# Patient Record
Sex: MALE | Race: BLACK OR AFRICAN AMERICAN | Employment: OTHER | URBAN - METROPOLITAN AREA
[De-identification: names, ages, dates, MRNs, and addresses within clinical notes are randomized per-mention and may not be internally consistent; named-entity substitution may affect disease eponyms.]

---

## 2018-10-19 ENCOUNTER — IP HISTORICAL/CONVERTED ENCOUNTER (OUTPATIENT)
Dept: OTHER | Age: 43
End: 2018-10-19

## 2020-10-05 ENCOUNTER — HOSPITAL ENCOUNTER (OUTPATIENT)
Dept: NON INVASIVE DIAGNOSTICS | Age: 45
Discharge: HOME | End: 2020-10-05
Attending: INTERNAL MEDICINE
Payer: MEDICARE

## 2020-10-05 VITALS
DIASTOLIC BLOOD PRESSURE: 83 MMHG | SYSTOLIC BLOOD PRESSURE: 149 MMHG | BODY MASS INDEX: 29.16 KG/M2 | WEIGHT: 220 LBS | HEIGHT: 73 IN

## 2020-10-05 DIAGNOSIS — R07.9 CHEST PAIN, UNSPECIFIED: ICD-10-CM

## 2020-10-05 LAB
ECHO AO ROOT DIAM: 3.6 CM
ECHO AV AREA PEAK VELOCITY: 3.6 CM2
ECHO AV AREA PLAN/BSA: 1.52
ECHO AV AREA VTI: 3.41 CM2
ECHO AV AREA/BSA PEAK VELOCITY: 1.6 CM2/M2
ECHO AV AREA/BSA VTI: 1.5 CM2/M2
ECHO AV CUSP MM: 2.8 CM
ECHO AV MEAN GRADIENT: 6 MMHG
ECHO AV PEAK GRADIENT: 12 MMHG
ECHO AV VTI: 30.9 CM
ECHO EST RA PRESSURE: 3 MMHG
ECHO LA AREA 2C: 28.2 CM2
ECHO LA AREA 4C: 29.5 CM2
ECHO LA MAJOR AXIS: 6.93 CM
ECHO LA MINOR AXIS: 3.09 CM
ECHO LA TO AORTIC ROOT RATIO: 1.33
ECHO LV E' LATERAL VELOCITY: 8.05 CM/S
ECHO LV EJECTION FRACTION BIPLANE: 64.7 % (ref 55–100)
ECHO LV INTERNAL DIMENSION DIASTOLIC: 5.8 CM (ref 4.2–5.9)
ECHO LV INTERNAL DIMENSION SYSTOLIC: 4.1 CM
ECHO LV IVSD: 1.3 CM (ref 0.6–1)
ECHO LV MASS 2D: 349.2 G (ref 88–224)
ECHO LV MASS INDEX 2D: 155.8 G/M2 (ref 49–115)
ECHO LV POSTERIOR WALL DIASTOLIC: 1.4 CM (ref 0.6–1)
ECHO LVOT DIAM: 2.4 CM
ECHO LVOT PEAK GRADIENT: 8 MMHG
ECHO LVOT VTI: 23.3 CM
ECHO LVOT VTI: 23.3 CM
ECHO MV A VELOCITY: 67.4 CM/S
ECHO MV AREA PHT: 2.82 CM2
ECHO MV E DECELERATION TIME (DT): 0.27 S
ECHO MV E VELOCITY: 95.6 CM/S
ECHO MV E/A RATIO: 1.42
ECHO MV E/E' LATERAL: 11.88
ECHO MV PRESSURE HALF TIME (PHT): 0.08 S
ECHO PV REGURGITANT MAX VELOCITY: 137 CM/S
ECHO PV REGURGITANT MAX VELOCITY: 172 CM/S
ECHO PV REGURGITANT MAX VELOCITY: 248 CM/S
ECHO RA AREA 4C: 19.7 CM2
ECHO RA MAJOR AXIS: 5.81 CM
ECHO RIGHT VENTRICULAR SYSTOLIC PRESSURE (RVSP): 28 MMHG
ECHO RV INTERNAL DIMENSION: 3.1 CM
ECHO RV TAPSE: 3.46 CM (ref 1.5–2)
ECHO TV MEAN GRADIENT: 4 MMHG
ECHO TV REGURGITANT PEAK GRADIENT: 25 MMHG
LA VOL DISK BP: 107.96 CM3 (ref 18–58)

## 2020-10-05 PROCEDURE — 93306 TTE W/DOPPLER COMPLETE: CPT

## 2020-10-15 ENCOUNTER — OFFICE VISIT (OUTPATIENT)
Dept: GASTROENTEROLOGY | Age: 45
End: 2020-10-15
Payer: MEDICARE

## 2020-10-15 DIAGNOSIS — N18.6 ANEMIA DUE TO CHRONIC KIDNEY DISEASE, ON CHRONIC DIALYSIS (HCC): ICD-10-CM

## 2020-10-15 DIAGNOSIS — D63.1 ANEMIA DUE TO CHRONIC KIDNEY DISEASE, ON CHRONIC DIALYSIS (HCC): ICD-10-CM

## 2020-10-15 DIAGNOSIS — I10 ESSENTIAL HYPERTENSION: ICD-10-CM

## 2020-10-15 DIAGNOSIS — K92.1 GASTROINTESTINAL HEMORRHAGE WITH MELENA: ICD-10-CM

## 2020-10-15 DIAGNOSIS — Z99.2 ANEMIA DUE TO CHRONIC KIDNEY DISEASE, ON CHRONIC DIALYSIS (HCC): ICD-10-CM

## 2020-10-15 DIAGNOSIS — K92.1 MELENA: Primary | ICD-10-CM

## 2020-10-15 PROCEDURE — 99203 OFFICE O/P NEW LOW 30 MIN: CPT | Performed by: INTERNAL MEDICINE

## 2020-10-15 RX ORDER — HYDRALAZINE HYDROCHLORIDE 100 MG/1
100 TABLET, FILM COATED ORAL 3 TIMES DAILY
COMMUNITY

## 2020-10-15 RX ORDER — CARVEDILOL 25 MG/1
25 TABLET ORAL 2 TIMES DAILY WITH MEALS
COMMUNITY

## 2020-10-15 RX ORDER — NIFEDIPINE 60 MG/1
60 TABLET, EXTENDED RELEASE ORAL DAILY
COMMUNITY
End: 2021-07-07 | Stop reason: SDUPTHER

## 2020-10-15 RX ORDER — SEVELAMER CARBONATE 800 MG/1
800 TABLET, FILM COATED ORAL 3 TIMES DAILY
COMMUNITY
End: 2021-08-10

## 2020-10-15 RX ORDER — FUROSEMIDE 40 MG/1
40 TABLET ORAL DAILY
COMMUNITY

## 2020-10-15 NOTE — H&P (VIEW-ONLY)
Gastroenterology Consult     Referring Physician: Andree Otto MD     Consult Date: 10/15/2020     Subjective:     Chief Complaint: GI bleeding    History of Present Illness: Samra Rogers is a 39 y.o. male who is seen in consultation for CRI and anemia   melena and on dialysis   Patient has history of chronic renal failure secondary to hypertension he is currently on dialysis. He has noted dark stools and low hemoglobin he was referred for further evaluation previously patient has had upper endoscopy and colonoscopy which was unremarkable he denies any abdominal pain nausea vomiting or weight loss. Past Medical History:   Diagnosis Date    Chronic kidney disease     Hypertension      Past Surgical History:   Procedure Laterality Date    HX ORTHOPAEDIC      left carpal tunnel     HX ROTATOR CUFF REPAIR        History reviewed. No pertinent family history. Social History     Tobacco Use    Smoking status: Current Every Day Smoker     Packs/day: 0.25     Years: 11.00     Pack years: 2.75    Smokeless tobacco: Never Used    Tobacco comment: 1 cigarette daily    Substance Use Topics    Alcohol use: Yes     Comment: social       No Known Allergies  Current Outpatient Medications   Medication Sig    sevelamer carbonate (RENVELA) 800 mg tab tab Take 800 mg by mouth three (3) times daily.  NIFEdipine ER (PROCARDIA XL) 60 mg ER tablet Take 60 mg by mouth daily.  carvediloL (COREG) 25 mg tablet Take 25 mg by mouth two (2) times daily (with meals).  hydrALAZINE (APRESOLINE) 100 mg tablet Take 100 mg by mouth three (3) times daily.  furosemide (LASIX) 40 mg tablet Take 40 mg by mouth daily. No current facility-administered medications for this visit. Review of Systems:  A detailed 10 organ review of systems is obtained with pertinent positives as listed in the History of Present Illness and Past Medical History. All others are negative.     Objective:     Physical Exam:  There were no vitals taken for this visit. Skin:  Extremities and face reveal no rashes. No ge erythema. No telangiectasias on the chest wall. HEENT: Sclerae anicteric. Extra-occular muscles are intact. No oral ulcers. No abnormal pigmentation of the lips. The neck is supple. Cardiovascular: Regular rate and rhythm. Respiratory:  Comfortable breathing with no accessory muscle use. GI:  Abdomen nondistended, soft, and nontender. Normal active bowel sounds. No enlargement of the liver or spleen. No masses palpable. Rectal:  Deferred  Musculoskeletal: Extremities have good range of motion. Neurological:  Gross memory appears intact. Patient is alert and oriented. Psychiatric:  Mood appears appropriate with judgement intact. Lymphatic:  No cervical or supraclavicular adenopathy. Lab/Data Review:  Lab results reviewed. For significant abnormal values and values requiring intervention, see assessment and plan. Assessment/Plan:     1. Melena  egd    2. Gastrointestinal hemorrhage with melena  Monitor hemoglobin and transfuse as necessary    3. Essential hypertension  Management by nephrology    4.  Anemia due to chronic kidney disease, on chronic dialysis [facility])         Thank you for allowing me to participate in this patients care

## 2020-10-15 NOTE — PROGRESS NOTES
Gastroenterology Consult     Referring Physician: Mary Fuller MD     Consult Date: 10/15/2020     Subjective:     Chief Complaint: GI bleeding    History of Present Illness: Marybeth Cedeño is a 39 y.o. male who is seen in consultation for CRI and anemia   melena and on dialysis   Patient has history of chronic renal failure secondary to hypertension he is currently on dialysis. He has noted dark stools and low hemoglobin he was referred for further evaluation previously patient has had upper endoscopy and colonoscopy which was unremarkable he denies any abdominal pain nausea vomiting or weight loss. Past Medical History:   Diagnosis Date    Chronic kidney disease     Hypertension      Past Surgical History:   Procedure Laterality Date    HX ORTHOPAEDIC      left carpal tunnel     HX ROTATOR CUFF REPAIR        History reviewed. No pertinent family history. Social History     Tobacco Use    Smoking status: Current Every Day Smoker     Packs/day: 0.25     Years: 11.00     Pack years: 2.75    Smokeless tobacco: Never Used    Tobacco comment: 1 cigarette daily    Substance Use Topics    Alcohol use: Yes     Comment: social       No Known Allergies  Current Outpatient Medications   Medication Sig    sevelamer carbonate (RENVELA) 800 mg tab tab Take 800 mg by mouth three (3) times daily.  NIFEdipine ER (PROCARDIA XL) 60 mg ER tablet Take 60 mg by mouth daily.  carvediloL (COREG) 25 mg tablet Take 25 mg by mouth two (2) times daily (with meals).  hydrALAZINE (APRESOLINE) 100 mg tablet Take 100 mg by mouth three (3) times daily.  furosemide (LASIX) 40 mg tablet Take 40 mg by mouth daily. No current facility-administered medications for this visit. Review of Systems:  A detailed 10 organ review of systems is obtained with pertinent positives as listed in the History of Present Illness and Past Medical History. All others are negative.     Objective:     Physical Exam:  There were no vitals taken for this visit. Skin:  Extremities and face reveal no rashes. No ge erythema. No telangiectasias on the chest wall. HEENT: Sclerae anicteric. Extra-occular muscles are intact. No oral ulcers. No abnormal pigmentation of the lips. The neck is supple. Cardiovascular: Regular rate and rhythm. Respiratory:  Comfortable breathing with no accessory muscle use. GI:  Abdomen nondistended, soft, and nontender. Normal active bowel sounds. No enlargement of the liver or spleen. No masses palpable. Rectal:  Deferred  Musculoskeletal: Extremities have good range of motion. Neurological:  Gross memory appears intact. Patient is alert and oriented. Psychiatric:  Mood appears appropriate with judgement intact. Lymphatic:  No cervical or supraclavicular adenopathy. Lab/Data Review:  Lab results reviewed. For significant abnormal values and values requiring intervention, see assessment and plan. Assessment/Plan:     1. Melena  egd    2. Gastrointestinal hemorrhage with melena  Monitor hemoglobin and transfuse as necessary    3. Essential hypertension  Management by nephrology    4.  Anemia due to chronic kidney disease, on chronic dialysis [facility])         Thank you for allowing me to participate in this patients care

## 2020-10-15 NOTE — PROGRESS NOTES
Glorious Para presents today for   Chief Complaint   Patient presents with    Abdominal Pain       Is someone accompanying this pt? no    Is the patient using any DME equipment during OV? No     Depression Screening:  3 most recent PHQ Screens 10/15/2020   Little interest or pleasure in doing things Not at all   Feeling down, depressed, irritable, or hopeless Not at all   Total Score PHQ 2 0       Learning Assessment:  No flowsheet data found. Health Maintenance reviewed and discussed and ordered per Provider. Health Maintenance Due   Topic Date Due    DTaP/Tdap/Td series (1 - Tdap) 01/10/1996    Lipid Screen  01/10/2015    Medicare Yearly Exam  08/07/2020    Flu Vaccine (1) 09/01/2020   . Coordination of Care:  1. Have you been to the ER, urgent care clinic since your last visit? Hospitalized since your last visit? Yes, difficulty raising arm and ended up being admitted for fluid around heart     2. Have you seen or consulted any other health care providers outside of the [address] [place] [address] since your last visit? Include any pap smears or colon screening.  no

## 2020-10-22 DIAGNOSIS — K92.1 GASTROINTESTINAL HEMORRHAGE WITH MELENA: ICD-10-CM

## 2020-10-26 ENCOUNTER — ANESTHESIA EVENT (OUTPATIENT)
Dept: ENDOSCOPY | Age: 45
End: 2020-10-26
Payer: MEDICARE

## 2020-10-26 RX ORDER — SODIUM CHLORIDE 0.9 % (FLUSH) 0.9 %
5-40 SYRINGE (ML) INJECTION EVERY 8 HOURS
Status: CANCELLED | OUTPATIENT
Start: 2020-10-27

## 2020-10-26 RX ORDER — MAGNESIUM SULFATE 100 %
4 CRYSTALS MISCELLANEOUS AS NEEDED
Status: CANCELLED | OUTPATIENT
Start: 2020-10-27

## 2020-10-26 RX ORDER — SODIUM CHLORIDE 0.9 % (FLUSH) 0.9 %
5-40 SYRINGE (ML) INJECTION AS NEEDED
Status: CANCELLED | OUTPATIENT
Start: 2020-10-27

## 2020-10-26 RX ORDER — DEXTROSE 50 % IN WATER (D50W) INTRAVENOUS SYRINGE
25-50 AS NEEDED
Status: CANCELLED | OUTPATIENT
Start: 2020-10-27

## 2020-10-26 RX ORDER — SODIUM CHLORIDE, SODIUM LACTATE, POTASSIUM CHLORIDE, CALCIUM CHLORIDE 600; 310; 30; 20 MG/100ML; MG/100ML; MG/100ML; MG/100ML
25 INJECTION, SOLUTION INTRAVENOUS CONTINUOUS
Status: CANCELLED | OUTPATIENT
Start: 2020-10-27 | End: 2020-10-28

## 2020-10-27 ENCOUNTER — HOSPITAL ENCOUNTER (OUTPATIENT)
Age: 45
Setting detail: OUTPATIENT SURGERY
Discharge: HOME | End: 2020-10-27
Attending: INTERNAL MEDICINE | Admitting: INTERNAL MEDICINE
Payer: MEDICARE

## 2020-10-27 ENCOUNTER — ANESTHESIA (OUTPATIENT)
Dept: ENDOSCOPY | Age: 45
End: 2020-10-27
Payer: MEDICARE

## 2020-10-27 VITALS
OXYGEN SATURATION: 97 % | RESPIRATION RATE: 20 BRPM | HEART RATE: 64 BPM | BODY MASS INDEX: 26.11 KG/M2 | SYSTOLIC BLOOD PRESSURE: 166 MMHG | HEIGHT: 73 IN | WEIGHT: 197 LBS | TEMPERATURE: 97.3 F | DIASTOLIC BLOOD PRESSURE: 94 MMHG

## 2020-10-27 LAB
COVID-19 RAPID TEST, COVR: NOT DETECTED
SARS-COV-2, COV2: NORMAL
SARS-COV-2, COV2: NORMAL
SPECIMEN SOURCE: NORMAL

## 2020-10-27 PROCEDURE — 76060000031 HC ANESTHESIA FIRST 0.5 HR: Performed by: INTERNAL MEDICINE

## 2020-10-27 PROCEDURE — 88305 TISSUE EXAM BY PATHOLOGIST: CPT

## 2020-10-27 PROCEDURE — 74011250636 HC RX REV CODE- 250/636: Performed by: NURSE ANESTHETIST, CERTIFIED REGISTERED

## 2020-10-27 PROCEDURE — 87635 SARS-COV-2 COVID-19 AMP PRB: CPT

## 2020-10-27 PROCEDURE — 76040000019: Performed by: INTERNAL MEDICINE

## 2020-10-27 PROCEDURE — 2709999900 HC NON-CHARGEABLE SUPPLY: Performed by: INTERNAL MEDICINE

## 2020-10-27 PROCEDURE — 77030037186 HC VLV ENDOSC STRL DEFENDO DISP MVAT -A: Performed by: INTERNAL MEDICINE

## 2020-10-27 RX ORDER — SODIUM CHLORIDE 0.9 % (FLUSH) 0.9 %
5-40 SYRINGE (ML) INJECTION AS NEEDED
Status: CANCELLED | OUTPATIENT
Start: 2020-10-27

## 2020-10-27 RX ORDER — SODIUM CHLORIDE 9 MG/ML
75 INJECTION, SOLUTION INTRAVENOUS CONTINUOUS
Status: CANCELLED | OUTPATIENT
Start: 2020-10-27

## 2020-10-27 RX ORDER — SODIUM CHLORIDE, SODIUM LACTATE, POTASSIUM CHLORIDE, CALCIUM CHLORIDE 600; 310; 30; 20 MG/100ML; MG/100ML; MG/100ML; MG/100ML
INJECTION, SOLUTION INTRAVENOUS
Status: DISCONTINUED | OUTPATIENT
Start: 2020-10-27 | End: 2020-10-27 | Stop reason: HOSPADM

## 2020-10-27 RX ORDER — SODIUM CHLORIDE 0.9 % (FLUSH) 0.9 %
5-40 SYRINGE (ML) INJECTION EVERY 8 HOURS
Status: CANCELLED | OUTPATIENT
Start: 2020-10-27

## 2020-10-27 RX ORDER — PROPOFOL 10 MG/ML
INJECTION, EMULSION INTRAVENOUS AS NEEDED
Status: DISCONTINUED | OUTPATIENT
Start: 2020-10-27 | End: 2020-10-27 | Stop reason: HOSPADM

## 2020-10-27 RX ADMIN — PROPOFOL 200 MG: 10 INJECTION, EMULSION INTRAVENOUS at 12:28

## 2020-10-27 RX ADMIN — SODIUM CHLORIDE, POTASSIUM CHLORIDE, SODIUM LACTATE AND CALCIUM CHLORIDE: 600; 310; 30; 20 INJECTION, SOLUTION INTRAVENOUS at 12:16

## 2020-10-27 NOTE — INTERVAL H&P NOTE
Update History & Physical    The Patient's History and Physical of October 2020,   Chart was reviewed with the patient and I examined the patient. There was no change. The surgical site was confirmed by the patient and me. Plan:  The risk, benefits, expected outcome, and alternative to the recommended procedure have been discussed with the patient. Patient understands and wants to proceed with the procedure.     Electronically signed by Luigi Marks MD on 10/27/2020 at 11:39 AM

## 2020-10-27 NOTE — ANESTHESIA POSTPROCEDURE EVALUATION
Procedure(s):  EGD with bx (DIALYSIS PATIENT) (RAPID TEST). No value filed. Anesthesia Post Evaluation      Multimodal analgesia: multimodal analgesia used between 6 hours prior to anesthesia start to PACU discharge  Patient location during evaluation: bedside  Patient participation: complete - patient cannot participate  Level of consciousness: awake and alert  Pain management: adequate  Airway patency: patent  Anesthetic complications: no  Cardiovascular status: stable  Respiratory status: acceptable  Hydration status: acceptable  Comments: DC when criteria met.   Post anesthesia nausea and vomiting:  none  Final Post Anesthesia Temperature Assessment:  Normothermia (36.0-37.5 degrees C)      INITIAL Post-op Vital signs:   Vitals Value Taken Time   /85 10/27/2020 12:35 PM   Temp 36.5 °C (97.7 °F) 10/27/2020 12:35 PM   Pulse 82 10/27/2020 12:35 PM   Resp 16 10/27/2020 12:35 PM   SpO2 99 % 10/27/2020 12:35 PM

## 2020-10-27 NOTE — OP NOTES
EGD Procedure Note        Patient: Bay Savage MRN: [id number]  SSN: [id number]    YOB: 1975  Age: 39 y.o. Sex: male        Date/Time:  10/27/2020 12:37 PM        IMPRESSION:   1. Gastric ulcer  2. Reflux esophagitis             Procedure: Esophagogastroduodenoscopy with biopsy    Indication:   1. GI bleeding  2. Melena    Endoscopist:  Chepe Castillo MD      Referring Provider:   Caryle Motes, MD    History: The history and physical exam were reviewed and updated. Endoscope: GIF [id number]     Extent of Exam: second portion of the duodenum    ASA: ASA 3 - Patient with moderate systemic disease with functional limitations    Anethesia/Sedation:  MAC anesthesia Propofol    Description of the procedure: The procedure was discussed with the patient including risks, benefits, alternatives including risks of iv sedation, bleeding, perforation and aspiration. A safety timeout was performed. The patient was placed in the left lateral decubitus position. A bite block was placed. The patient was using standard protocol. The patients vital signs were monitored at all times including heart rate/rhythm, blood pressure and oxygen saturation. The endoscope was then passed under direct visualization to the second portion of the duodenum. The endoscope was then slowly withdrawn while visualizing the mucosa. In the stomach a retroflexion was performed and gastric fundus and cardia visualized. The patient was then transferred to recovery in stable condition. Findings:   Esophagus: The esophageal mucosa was normal with no ulceration, mass or stricture. There was not evidence of Carrillo's esophagus was reflux esophagitis biopsies were taken for histology. Stomach: The gastric mucosa showed evidence of ulceration in the antrum this was biopsied for histology. There were no masses or stricture .    Duodenum: The duodenum mucosa was normal with no ulceration, mass, stricture and no evidence of villous atrophy. Therapies: None    Specimens:   ID Type Source Tests Collected by Time Destination   1 : GASTRIC ULCER BX Preservative Gastric  Rosy Laws MD 10/27/2020 [facility]   2 : [address]  Rosy Laws MD 10/27/2020 [facility]               EBL:None    Complications:   None; patient tolerated the procedure well. Discharge disposition:  Out of the recovery area when discharge criteria met        RECOMMENDATIONS:    1. Await pathology results proton pump inhibitor therapy monitor hemoglobin  2.  Repeat upper endoscopy in 3 months to ensure healing of the ulcer    Caleb Rhoades MD   October 27, 2020  12:37 PM

## 2020-10-27 NOTE — ANESTHESIA PREPROCEDURE EVALUATION
Relevant Problems   No relevant active problems       Anesthetic History   No history of anesthetic complications            Review of Systems / Medical History  Patient summary reviewed, nursing notes reviewed and pertinent labs reviewed    Pulmonary  Within defined limits                 Neuro/Psych   Within defined limits           Cardiovascular  Within defined limits                Exercise tolerance: >4 METS     GI/Hepatic/Renal  Within defined limits              Endo/Other  Within defined limits           Other Findings              Physical Exam    Airway  Mallampati: I  TM Distance: 4 - 6 cm  Neck ROM: normal range of motion   Mouth opening: Normal     Cardiovascular  Regular rate and rhythm,  S1 and S2 normal,  no murmur, click, rub, or gallop  Rhythm: regular  Rate: normal         Dental  No notable dental hx       Pulmonary  Breath sounds clear to auscultation               Abdominal  GI exam deferred       Other Findings            Anesthetic Plan    ASA: 3  Anesthesia type: total IV anesthesia          Induction: Intravenous  Anesthetic plan and risks discussed with: Patient

## 2020-10-29 LAB — SARS-COV-2, COV2NT: NOT DETECTED

## 2020-12-29 ENCOUNTER — HOSPITAL ENCOUNTER (INPATIENT)
Age: 45
LOS: 1 days | Discharge: HOME | DRG: 640 | End: 2020-12-30
Attending: EMERGENCY MEDICINE | Admitting: INTERNAL MEDICINE
Payer: MEDICARE

## 2020-12-29 ENCOUNTER — APPOINTMENT (OUTPATIENT)
Dept: GENERAL RADIOLOGY | Age: 45
DRG: 640 | End: 2020-12-29
Attending: EMERGENCY MEDICINE
Payer: MEDICARE

## 2020-12-29 DIAGNOSIS — Z99.2 END-STAGE RENAL DISEASE ON HEMODIALYSIS (HCC): ICD-10-CM

## 2020-12-29 DIAGNOSIS — D63.8 ANEMIA IN OTHER CHRONIC DISEASES CLASSIFIED ELSEWHERE: ICD-10-CM

## 2020-12-29 DIAGNOSIS — N18.6 END-STAGE RENAL DISEASE ON HEMODIALYSIS (HCC): ICD-10-CM

## 2020-12-29 DIAGNOSIS — E87.5 ACUTE HYPERKALEMIA: Primary | ICD-10-CM

## 2020-12-29 PROBLEM — D64.9 ANEMIA: Status: ACTIVE | Noted: 2020-12-29

## 2020-12-29 PROBLEM — I10 HTN (HYPERTENSION): Status: ACTIVE | Noted: 2020-12-29

## 2020-12-29 PROBLEM — R19.7 DIARRHEA: Status: ACTIVE | Noted: 2020-12-29

## 2020-12-29 LAB
ALBUMIN SERPL-MCNC: 4.1 G/DL (ref 3.5–4.7)
ALBUMIN/GLOB SERPL: 1.1 {RATIO}
ALP SERPL-CCNC: 58 U/L (ref 38–126)
ALT SERPL-CCNC: 11 U/L (ref 3–72)
ANION GAP SERPL CALC-SCNC: 13 MMOL/L
AST SERPL W P-5'-P-CCNC: 15 U/L (ref 17–74)
ATRIAL RATE: 82 BPM
BASOPHILS # BLD: 0.1 K/UL
BASOPHILS NFR BLD: 1 %
BILIRUB SERPL-MCNC: 0.5 MG/DL (ref 0.2–1)
BUN SERPL-MCNC: 77 MG/DL (ref 9–21)
BUN/CREAT SERPL: 4
CA-I BLD-MCNC: 8.8 MG/DL (ref 8.5–10.5)
CALCULATED P AXIS, ECG09: 42 DEGREES
CALCULATED R AXIS, ECG10: -22 DEGREES
CALCULATED T AXIS, ECG11: 91 DEGREES
CHLORIDE SERPL-SCNC: 108 MMOL/L (ref 94–111)
CO2 SERPL-SCNC: 23 MMOL/L (ref 21–33)
COLLECT DATE STL: NORMAL
CREAT SERPL-MCNC: 17.5 MG/DL (ref 0.8–1.5)
DIAGNOSIS, 93000: NORMAL
DIFFERENTIAL METHOD BLD: ABNORMAL
EOSINOPHIL # BLD: 0 K/UL
EOSINOPHIL NFR BLD: 0 %
ERYTHROCYTE [DISTWIDTH] IN BLOOD BY AUTOMATED COUNT: 18 % (ref 11.6–14.5)
GLOBULIN SER CALC-MCNC: 3.8 G/DL
GLUCOSE SERPL-MCNC: 90 MG/DL (ref 70–110)
HCT VFR BLD AUTO: 23.4 % (ref 36–48)
HEMOCCULT SP1 STL QL: POSITIVE
HGB BLD-MCNC: 6.9 G/DL (ref 13–16)
IMM GRANULOCYTES # BLD AUTO: 0 K/UL
IMM GRANULOCYTES NFR BLD AUTO: 0 %
LYMPHOCYTES # BLD: 1.5 K/UL
LYMPHOCYTES NFR BLD: 20 %
MCH RBC QN AUTO: 28.9 PG (ref 24–34)
MCHC RBC AUTO-ENTMCNC: 29.5 G/DL (ref 31–37)
MCV RBC AUTO: 97.9 FL (ref 74–97)
MONOCYTES # BLD: 0.4 K/UL
MONOCYTES NFR BLD: 5 %
NEUTS SEG # BLD: 5.7 K/UL
NEUTS SEG NFR BLD: 74 %
P-R INTERVAL, ECG05: 183 MS
PLATELET # BLD AUTO: 301 K/UL (ref 135–420)
PMV BLD AUTO: 10.3 FL (ref 9.2–11.8)
POTASSIUM SERPL-SCNC: 6.8 MMOL/L (ref 3.2–5.1)
POTASSIUM SERPL-SCNC: 7.7 MMOL/L (ref 3.2–5.1)
PROT SERPL-MCNC: 7.9 G/DL (ref 6.1–8.4)
Q-T INTERVAL, ECG07: 359 MS
QRS DURATION, ECG06: 96 MS
QTC CALCULATION (BEZET), ECG08: 420 MS
RBC # BLD AUTO: 2.39 M/UL (ref 4.7–5.5)
RBC MORPH BLD: ABNORMAL
RBC MORPH BLD: ABNORMAL
SODIUM SERPL-SCNC: 144 MMOL/L (ref 135–145)
VENTRICULAR RATE, ECG03: 82 BPM
WBC # BLD AUTO: 7.7 K/UL (ref 4.6–13.2)

## 2020-12-29 PROCEDURE — 84132 ASSAY OF SERUM POTASSIUM: CPT

## 2020-12-29 PROCEDURE — 74011000258 HC RX REV CODE- 258: Performed by: EMERGENCY MEDICINE

## 2020-12-29 PROCEDURE — 86901 BLOOD TYPING SEROLOGIC RH(D): CPT

## 2020-12-29 PROCEDURE — 74011000250 HC RX REV CODE- 250: Performed by: EMERGENCY MEDICINE

## 2020-12-29 PROCEDURE — 74011250637 HC RX REV CODE- 250/637: Performed by: EMERGENCY MEDICINE

## 2020-12-29 PROCEDURE — 90935 HEMODIALYSIS ONE EVALUATION: CPT

## 2020-12-29 PROCEDURE — 74011250637 HC RX REV CODE- 250/637: Performed by: NURSE PRACTITIONER

## 2020-12-29 PROCEDURE — 96375 TX/PRO/DX INJ NEW DRUG ADDON: CPT

## 2020-12-29 PROCEDURE — 74011250636 HC RX REV CODE- 250/636

## 2020-12-29 PROCEDURE — P9016 RBC LEUKOCYTES REDUCED: HCPCS

## 2020-12-29 PROCEDURE — 99285 EMERGENCY DEPT VISIT HI MDM: CPT

## 2020-12-29 PROCEDURE — 71045 X-RAY EXAM CHEST 1 VIEW: CPT

## 2020-12-29 PROCEDURE — 93005 ELECTROCARDIOGRAM TRACING: CPT

## 2020-12-29 PROCEDURE — 85025 COMPLETE CBC W/AUTO DIFF WBC: CPT

## 2020-12-29 PROCEDURE — 96365 THER/PROPH/DIAG IV INF INIT: CPT

## 2020-12-29 PROCEDURE — 65270000029 HC RM PRIVATE

## 2020-12-29 PROCEDURE — 82728 ASSAY OF FERRITIN: CPT

## 2020-12-29 PROCEDURE — 83540 ASSAY OF IRON: CPT

## 2020-12-29 PROCEDURE — 30233N1 TRANSFUSION OF NONAUTOLOGOUS RED BLOOD CELLS INTO PERIPHERAL VEIN, PERCUTANEOUS APPROACH: ICD-10-PCS | Performed by: INTERNAL MEDICINE

## 2020-12-29 PROCEDURE — 74011250636 HC RX REV CODE- 250/636: Performed by: EMERGENCY MEDICINE

## 2020-12-29 PROCEDURE — 74011636637 HC RX REV CODE- 636/637: Performed by: EMERGENCY MEDICINE

## 2020-12-29 PROCEDURE — 82272 OCCULT BLD FECES 1-3 TESTS: CPT

## 2020-12-29 PROCEDURE — 94760 N-INVAS EAR/PLS OXIMETRY 1: CPT

## 2020-12-29 PROCEDURE — 5A1D70Z PERFORMANCE OF URINARY FILTRATION, INTERMITTENT, LESS THAN 6 HOURS PER DAY: ICD-10-PCS | Performed by: INTERNAL MEDICINE

## 2020-12-29 RX ORDER — SODIUM POLYSTYRENE SULFONATE 15 G/60ML
30 SUSPENSION ORAL; RECTAL
Status: COMPLETED | OUTPATIENT
Start: 2020-12-29 | End: 2020-12-29

## 2020-12-29 RX ORDER — DEXTROSE 50 % IN WATER (D50W) INTRAVENOUS SYRINGE
25
Status: COMPLETED | OUTPATIENT
Start: 2020-12-29 | End: 2020-12-29

## 2020-12-29 RX ORDER — SEVELAMER CARBONATE 800 MG/1
800 TABLET, FILM COATED ORAL 3 TIMES DAILY
Status: DISCONTINUED | OUTPATIENT
Start: 2020-12-29 | End: 2020-12-30 | Stop reason: HOSPADM

## 2020-12-29 RX ORDER — SODIUM CHLORIDE 0.9 % (FLUSH) 0.9 %
5-40 SYRINGE (ML) INJECTION EVERY 8 HOURS
Status: DISCONTINUED | OUTPATIENT
Start: 2020-12-29 | End: 2020-12-30 | Stop reason: HOSPADM

## 2020-12-29 RX ORDER — OMEPRAZOLE 40 MG/1
40 CAPSULE, DELAYED RELEASE ORAL 2 TIMES DAILY
COMMUNITY
End: 2021-09-01

## 2020-12-29 RX ORDER — DIPHENHYDRAMINE HCL 25 MG
25 TABLET ORAL ONCE
Status: ACTIVE | OUTPATIENT
Start: 2020-12-29 | End: 2020-12-29

## 2020-12-29 RX ORDER — ACETAMINOPHEN 650 MG/1
650 SUPPOSITORY RECTAL
Status: DISCONTINUED | OUTPATIENT
Start: 2020-12-29 | End: 2020-12-30 | Stop reason: HOSPADM

## 2020-12-29 RX ORDER — CARVEDILOL 12.5 MG/1
25 TABLET ORAL 2 TIMES DAILY WITH MEALS
Status: DISCONTINUED | OUTPATIENT
Start: 2020-12-29 | End: 2020-12-30 | Stop reason: HOSPADM

## 2020-12-29 RX ORDER — IBUPROFEN 200 MG
1 TABLET ORAL DAILY PRN
Status: DISCONTINUED | OUTPATIENT
Start: 2020-12-29 | End: 2020-12-30 | Stop reason: HOSPADM

## 2020-12-29 RX ORDER — HYDRALAZINE HYDROCHLORIDE 25 MG/1
100 TABLET, FILM COATED ORAL 3 TIMES DAILY
Status: DISCONTINUED | OUTPATIENT
Start: 2020-12-29 | End: 2020-12-30 | Stop reason: HOSPADM

## 2020-12-29 RX ORDER — ACETAMINOPHEN 325 MG/1
650 TABLET ORAL
Status: DISCONTINUED | OUTPATIENT
Start: 2020-12-29 | End: 2020-12-30 | Stop reason: HOSPADM

## 2020-12-29 RX ORDER — POLYETHYLENE GLYCOL 3350 17 G/17G
17 POWDER, FOR SOLUTION ORAL DAILY PRN
Status: DISCONTINUED | OUTPATIENT
Start: 2020-12-29 | End: 2020-12-30 | Stop reason: HOSPADM

## 2020-12-29 RX ORDER — PROMETHAZINE HYDROCHLORIDE 25 MG/1
12.5 TABLET ORAL
Status: DISCONTINUED | OUTPATIENT
Start: 2020-12-29 | End: 2020-12-30 | Stop reason: HOSPADM

## 2020-12-29 RX ORDER — SODIUM CHLORIDE 9 MG/ML
250 INJECTION, SOLUTION INTRAVENOUS AS NEEDED
Status: DISCONTINUED | OUTPATIENT
Start: 2020-12-29 | End: 2020-12-30 | Stop reason: HOSPADM

## 2020-12-29 RX ORDER — FUROSEMIDE 40 MG/1
40 TABLET ORAL DAILY
Status: DISCONTINUED | OUTPATIENT
Start: 2020-12-29 | End: 2020-12-30 | Stop reason: HOSPADM

## 2020-12-29 RX ORDER — ONDANSETRON 2 MG/ML
4 INJECTION INTRAMUSCULAR; INTRAVENOUS
Status: DISCONTINUED | OUTPATIENT
Start: 2020-12-29 | End: 2020-12-30 | Stop reason: HOSPADM

## 2020-12-29 RX ORDER — NIFEDIPINE 30 MG/1
60 TABLET, EXTENDED RELEASE ORAL DAILY
Status: DISCONTINUED | OUTPATIENT
Start: 2020-12-29 | End: 2020-12-30 | Stop reason: HOSPADM

## 2020-12-29 RX ORDER — PANTOPRAZOLE SODIUM 40 MG/1
40 TABLET, DELAYED RELEASE ORAL 2 TIMES DAILY
Status: DISCONTINUED | OUTPATIENT
Start: 2020-12-29 | End: 2020-12-30 | Stop reason: HOSPADM

## 2020-12-29 RX ORDER — SODIUM CHLORIDE 0.9 % (FLUSH) 0.9 %
5-40 SYRINGE (ML) INJECTION AS NEEDED
Status: DISCONTINUED | OUTPATIENT
Start: 2020-12-29 | End: 2020-12-30 | Stop reason: HOSPADM

## 2020-12-29 RX ADMIN — CARVEDILOL 25 MG: 12.5 TABLET, FILM COATED ORAL at 16:53

## 2020-12-29 RX ADMIN — Medication 10 ML: at 08:56

## 2020-12-29 RX ADMIN — NIFEDIPINE 60 MG: 30 TABLET, FILM COATED, EXTENDED RELEASE ORAL at 08:55

## 2020-12-29 RX ADMIN — HYDRALAZINE HYDROCHLORIDE 100 MG: 25 TABLET, FILM COATED ORAL at 08:56

## 2020-12-29 RX ADMIN — CARVEDILOL 25 MG: 12.5 TABLET, FILM COATED ORAL at 08:56

## 2020-12-29 RX ADMIN — PANTOPRAZOLE SODIUM 40 MG: 40 TABLET, DELAYED RELEASE ORAL at 17:04

## 2020-12-29 RX ADMIN — EPOETIN ALFA-EPBX 10000 UNITS: 10000 INJECTION, SOLUTION INTRAVENOUS; SUBCUTANEOUS at 12:18

## 2020-12-29 RX ADMIN — SODIUM POLYSTYRENE SULFONATE 30 G: 15 SUSPENSION ORAL; RECTAL at 02:37

## 2020-12-29 RX ADMIN — SEVELAMER CARBONATE 800 MG: 800 TABLET, FILM COATED ORAL at 16:53

## 2020-12-29 RX ADMIN — INSULIN HUMAN 10 UNITS: 100 INJECTION, SOLUTION PARENTERAL at 02:33

## 2020-12-29 RX ADMIN — SEVELAMER CARBONATE 800 MG: 800 TABLET, FILM COATED ORAL at 08:55

## 2020-12-29 RX ADMIN — Medication 10 ML: at 21:13

## 2020-12-29 RX ADMIN — CALCIUM GLUCONATE 1 G: 98 INJECTION, SOLUTION INTRAVENOUS at 02:37

## 2020-12-29 RX ADMIN — SEVELAMER CARBONATE 800 MG: 800 TABLET, FILM COATED ORAL at 21:13

## 2020-12-29 RX ADMIN — HYDRALAZINE HYDROCHLORIDE 100 MG: 25 TABLET, FILM COATED ORAL at 16:53

## 2020-12-29 RX ADMIN — HYDRALAZINE HYDROCHLORIDE 100 MG: 25 TABLET, FILM COATED ORAL at 21:13

## 2020-12-29 RX ADMIN — DEXTROSE MONOHYDRATE 12.5 G: 25 INJECTION, SOLUTION INTRAVENOUS at 02:34

## 2020-12-29 RX ADMIN — FUROSEMIDE 40 MG: 40 TABLET ORAL at 08:56

## 2020-12-29 RX ADMIN — SODIUM POLYSTYRENE SULFONATE 30 G: 15 SUSPENSION ORAL; RECTAL at 05:10

## 2020-12-29 RX ADMIN — PANTOPRAZOLE SODIUM 40 MG: 40 TABLET, DELAYED RELEASE ORAL at 08:56

## 2020-12-29 NOTE — PROGRESS NOTES
conducted an initial consultation and Spiritual Assessment for Arturo Day, who is a 39 y.o.,male. Patients Primary Language is: [place]. According to the patients EMR Catholic Affiliation is: [facility].     The reason the Patient came to the hospital is:   Patient Active Problem List    Diagnosis Date Noted    Diarrhea 12/29/2020     Priority: 4 - Four    ESRD (end stage renal disease) on dialysis ([facility] [place] [address] 12/29/2020    Anemia 12/29/2020    HTN (hypertension) 12/29/2020    Acute hyperkalemia 12/29/2020        The  provided the following Interventions:  Initiated a relationship of care and support. Explored issues of eliud, belief, spirituality and Baptism/ritual needs while hospitalized. Listened empathically. Provided chaplaincy education. Provided information about Spiritual Care Services. Offered assurance of continued prayers on patient's behalf. Chart reviewed. The following outcomes where achieved:  Patient expressed gratitude for 's visit. Assessment:  Patient does not have any Baptism/cultural needs that will affect patients preferences in health care. There are no spiritual or Baptism issues which require intervention at this time. Plan:  Chaplains will continue to follow and will provide pastoral care on an as needed/requested basis.  recommends bedside caregivers page  on duty if patient shows signs of acute spiritual or emotional distress.         [address] [facility].   [phone]

## 2020-12-29 NOTE — ED PROVIDER NOTES
EMERGENCY DEPARTMENT HISTORY AND PHYSICAL EXAM      Date: 12/29/2020  Patient Name: Yeimy Chowdhury    History of Presenting Illness     Chief Complaint   Patient presents with    Shortness of Breath     TTS, missed Saturday HD    Diarrhea     3-4 episodes,  12/26/2020    Skin Problem     raised areas to occiput of head       History Provided By: Patient    HPI: Yeimy Chowdhury, 39 y.o. male with a past medical history significant hypertension and ESRD on HD Sat/Tue/Thur; GI bleed, CAD, MI presents to the ED with cc of shortness of breath of insidious onset. Patient missed his last dialysis on Saturday. His next dialysis is this morning at 5 AM.  No chest pain or other constitutional symptoms. No other relieving or aggravating factors. Also had episodes of diarrhea for the last 3 days, total of 3-4 episodes. Tthere are no other complaints, changes, or physical findings at this time. PCP: Bakari Keith MD    No current facility-administered medications on file prior to encounter. Current Outpatient Medications on File Prior to Encounter   Medication Sig Dispense Refill    omeprazole (PRILOSEC) 40 mg capsule Take 40 mg by mouth two (2) times a day.  sevelamer carbonate (RENVELA) 800 mg tab tab Take 800 mg by mouth three (3) times daily.  NIFEdipine ER (PROCARDIA XL) 60 mg ER tablet Take 60 mg by mouth daily.  carvediloL (COREG) 25 mg tablet Take 25 mg by mouth two (2) times daily (with meals).  hydrALAZINE (APRESOLINE) 100 mg tablet Take 100 mg by mouth three (3) times daily.  furosemide (LASIX) 40 mg tablet Take 40 mg by mouth daily.          Past History     Past Medical History:  Past Medical History:   Diagnosis Date    CAD (coronary artery disease)     MI (12 years ago)    Chronic kidney disease     ESRD x4 years HD Treatment    Hypertension        Past Surgical History:  Past Surgical History:   Procedure Laterality Date    HX ORTHOPAEDIC      left carpal tunnel     HX ROTATOR CUFF REPAIR         Family History:  History reviewed. No pertinent family history. Social History:  Social History     Tobacco Use    Smoking status: Current Some Day Smoker     Packs/day: 0.50     Years: 15.00     Pack years: 7.50    Smokeless tobacco: Never Used    Tobacco comment: l   Substance Use Topics    Alcohol use: Yes     Alcohol/week: 1.0 - 2.0 standard drinks     Types: 1 - 2 Glasses of wine per week     Comment: social     Drug use: Never       Allergies:  No Known Allergies      Review of Systems     Review of Systems   Constitutional: Positive for fatigue. Negative for fever. Respiratory: Positive for shortness of breath. Negative for cough. Cardiovascular: Negative for chest pain. Gastrointestinal: Positive for diarrhea. Negative for abdominal pain. Musculoskeletal: Negative for back pain and gait problem. Neurological: Negative for dizziness and seizures. Psychiatric/Behavioral: Negative for agitation and behavioral problems. Physical Exam     Physical Exam  Vitals signs and nursing note reviewed. Constitutional:       General: He is not in acute distress. Appearance: He is well-developed. He is not toxic-appearing or diaphoretic. HENT:      Head: Normocephalic and atraumatic. Nose: Nose normal.      Mouth/Throat:      Mouth: Mucous membranes are moist.      Pharynx: Oropharynx is clear. Eyes:      Extraocular Movements: Extraocular movements intact. Pupils: Pupils are equal, round, and reactive to light. Neck:      Musculoskeletal: Normal range of motion and neck supple. Cardiovascular:      Rate and Rhythm: Normal rate and regular rhythm. Heart sounds: Normal heart sounds. Pulmonary:      Effort: Pulmonary effort is normal. No respiratory distress. Breath sounds: Normal breath sounds. Chest:      Chest wall: No mass or tenderness. Abdominal:      General: Bowel sounds are normal. There is no abdominal bruit.       Palpations: Abdomen is soft. There is no hepatomegaly. Tenderness: There is no abdominal tenderness. There is no rebound. Musculoskeletal: Normal range of motion. Right lower leg: He exhibits no tenderness. No edema. Left lower leg: He exhibits no tenderness. No edema. Skin:     General: Skin is warm and dry. Capillary Refill: Capillary refill takes less than 2 seconds. Neurological:      General: No focal deficit present. Mental Status: He is alert and oriented to person, place, and time. Psychiatric:         Mood and Affect: Mood normal.         Behavior: Behavior normal.         Lab and Diagnostic Study Results     Labs -     Recent Results (from the past 24 hour(s))   CBC WITH AUTOMATED DIFF    Collection Time: 12/29/20  1:25 AM   Result Value Ref Range    WBC 7.7 4.6 - 13.2 K/uL    RBC 2.39 (L) 4.70 - 5.50 M/uL    HGB 6.9 (L) 13.0 - 16.0 g/dL    HCT 23.4 (L) 36.0 - 48.0 %    MCV 97.9 (H) 74.0 - 97.0 FL    MCH 28.9 24.0 - 34.0 PG    MCHC 29.5 (L) 31.0 - 37.0 g/dL    RDW 18.0 (H) 11.6 - 14.5 %    PLATELET 630 119 - 979 K/uL    MPV 10.3 9.2 - 11.8 FL    NEUTROPHILS 74 %    LYMPHOCYTES 20 %    MONOCYTES 5 %    EOSINOPHILS 0 %    BASOPHILS 1 %    IMMATURE GRANULOCYTES 0 %    ABS. NEUTROPHILS 5.7 K/UL    ABS. LYMPHOCYTES 1.5 K/UL    ABS. MONOCYTES 0.4 K/UL    ABS. EOSINOPHILS 0.0 K/UL    ABS. BASOPHILS 0.1 K/UL    ABS. IMM.  GRANS. 0.0 K/UL    DF Manual      RBC COMMENTS Anisocytosis  1+        RBC COMMENTS Hypochromia  3+       METABOLIC PANEL, COMPREHENSIVE    Collection Time: 12/29/20  1:25 AM   Result Value Ref Range    Sodium 144 135 - 145 mmol/L    Potassium 7.7 (HH) 3.2 - 5.1 mmol/L    Chloride 108 94 - 111 mmol/L    CO2 23 21 - 33 mmol/L    Anion gap 13 mmol/L    Glucose 90 70 - 110 mg/dL    BUN 77 (H) 9 - 21 mg/dL    Creatinine 17.50 (H) 0.8 - 1.50 mg/dL    BUN/Creatinine ratio 4      GFR est AA 4 ml/min/1.73m2    GFR est non-AA 3 ml/min/1.73m2    Calcium 8.8 8.5 - 10.5 mg/dL Bilirubin, total 0.5 0.2 - 1.0 mg/dL    AST (SGOT) 15 (L) 17 - 74 U/L    ALT (SGPT) 11 3 - 72 U/L    Alk.  phosphatase 58 38 - 126 U/L    Protein, total 7.9 6.1 - 8.4 g/dL    Albumin 4.1 3.5 - 4.7 g/dL    Globulin 3.8 g/dL    A-G Ratio 1.1     EKG, 12 LEAD, INITIAL    Collection Time: 12/29/20  1:39 AM   Result Value Ref Range    Ventricular Rate 82 BPM    Atrial Rate 82 BPM    P-R Interval 183 ms    QRS Duration 96 ms    Q-T Interval 359 ms    QTC Calculation (Bezet) 420 ms    Calculated P Axis 42 degrees    Calculated R Axis -22 degrees    Calculated T Axis 91 degrees    Diagnosis       Sinus rhythm  Abnormal R-wave progression, late transition  Nonspecific T abnormalities, lateral leads    No significant change was found      Confirmed by Frances Mcfarlane ([id number]) on 12/29/2020 11:32:10 AM     POTASSIUM    Collection Time: 12/29/20  4:37 AM   Result Value Ref Range    Potassium 6.8 (HH) 3.2 - 5.1 mmol/L   TYPE & SCREEN    Collection Time: 12/29/20  4:37 AM   Result Value Ref Range    Crossmatch Expiration 01/01/2021,2357     ABO/Rh(D) AB Positive     Antibody screen Negative     Unit number [id number]     Blood component type [facility]     Unit division 00     Status of unit Issued     [address] to transfuse     Crossmatch result Compatible     Unit number [id number]     Blood component type [facility]     Unit division 00     Status of unit [address] to transfuse     Crossmatch result Compatible    OCCULT BLOOD, STOOL    Collection Time: 12/29/20  5:30 AM   Result Value Ref Range    Occult Blood,day 1 Positive      Day 1 date: 12,292,020       Recent Results (from the past 12 hour(s))   POTASSIUM    Collection Time: 12/29/20  4:37 AM   Result Value Ref Range    Potassium 6.8 (HH) 3.2 - 5.1 mmol/L   TYPE & SCREEN    Collection Time: 12/29/20  4:37 AM   Result Value Ref Range    Crossmatch Expiration 01/01/2021,2359     ABO/Rh(D) AB Positive     Antibody screen Negative     Unit number [id number]     Blood component type  LR     Unit division 00     Status of unit [address] to transfuse     Crossmatch result Compatible     Unit number [id number]     Blood component type  LR     Unit division 00     Status of unit [address] to transfuse     Crossmatch result Compatible    OCCULT BLOOD, STOOL    Collection Time: 12/29/20  5:30 AM   Result Value Ref Range    Occult Blood,day 1 Positive      Day 1 date: 12,292,020         Radiologic Studies -     CT Results  (Last 48 hours)    None        CXR Results  (Last 48 hours)               12/29/20 0131  XR CHEST PORT Final result    Impression:  IMPRESSION:       Persistent cardiomegaly but no acute cardiopulmonary process. Narrative:  EXAM: One view chest x-ray       CLINICAL INDICATION/HISTORY: Dyspnea. COMPARISON: 09/08/2020. TECHNIQUE: Single AP view of the chest was obtained.       _______________       FINDINGS:       HEART, VESSELS, MEDIASTINUM: Heart size is enlarged, but stable. No vascular   congestion. LUNGS, PLEURAL SPACES: The lungs are clear. No effusion or pneumothorax. BONY THORAX, SOFT TISSUES: Unremarkable.       _______________               0140  EKG shows normal sinus rhythm at 82 bpm, left axis deviation, nonspecific ST-T changes, no STEMI. Medical Decision Making     - I am the first provider for this patient. - I reviewed the vital signs, available nursing notes, past medical history, past surgical history, family history and social history. - Initial assessment performed. The patients presenting problems have been discussed, and they are in agreement with the care plan formulated and outlined with them. I have encouraged them to ask questions as they arise throughout their visit. Vital Signs-Reviewed the patient's vital signs.   Patient Vitals for the past 12 hrs:   Temp Pulse Resp BP SpO2   12/29/20 1325 98.4 °F (36.9 °C) (!) 57  (!) 158/82    12/29/20 1312 98.4 °F (36.9 °C) (!) 59  (!) 153/82    12/29/20 1310  (!) 59  (!) 153/82    12/29/20 1255  63  (!) 144/78    12/29/20 1240  (!) 59  (!) 141/79    12/29/20 1225  (!) 59  (!) 150/77    12/29/20 1210  64  (!) 142/80    12/29/20 1155  68  (!) 172/98    12/29/20 1140  68  (!) 167/100    12/29/20 1125 98.4 °F (36.9 °C) 78 18 (!) 157/99    12/29/20 0832     93 %   12/29/20 0800  80      12/29/20 0705 98.1 °F (36.7 °C) 80 18 (!) 171/98 97 %   12/29/20 0558 97.3 °F (36.3 °C) 76 18 (!) 147/76 100 %   12/29/20 0521  74 16 136/67 98 %   12/29/20 0456  86 23     12/29/20 0345 97.5 °F (36.4 °C) 87 19 (!) 146/78 98 %   12/29/20 0316  86 24 133/67 97 %   12/29/20 0246  88 21 (!) 145/63 97 %   12/29/20 0215  88 28 (!) 161/93        Records Reviewed: Nursing Notes    ED Course/Provider Notes (Medical Decision Making):   Due to patient's severe hyperkalemia of 7.7 and peaked T waves on his EKG, patient will be admitted for inpatient dialysis emergently this morning. He also will need blood transfusion due to H&H of 6.9 and 23. Case discussed with Hospitalist and  Dr. Carlyle Herbert, patient's nephrologist.  Critical care 45 minutes      Disposition     Disposition: Condition unchanged and stable        DISCHARGE PLAN:  1. Current Discharge Medication List      CONTINUE these medications which have NOT CHANGED    Details   omeprazole (PRILOSEC) 40 mg capsule Take 40 mg by mouth two (2) times a day. sevelamer carbonate (RENVELA) 800 mg tab tab Take 800 mg by mouth three (3) times daily. NIFEdipine ER (PROCARDIA XL) 60 mg ER tablet Take 60 mg by mouth daily. carvediloL (COREG) 25 mg tablet Take 25 mg by mouth two (2) times daily (with meals). hydrALAZINE (APRESOLINE) 100 mg tablet Take 100 mg by mouth three (3) times daily. furosemide (LASIX) 40 mg tablet Take 40 mg by mouth daily. 2.   Follow-up Information    None       3.   Return to ED if worse   4. Current Discharge Medication List            Diagnosis     Clinical Impression:   1. Acute hyperkalemia    2. End-stage renal disease on hemodialysis ([facility] [address]    3. Anemia in other chronic diseases classified elsewhere        Attestations:    Chintan Rogers MD    Please note that this dictation was completed with [employer], the computer voice recognition software. Quite often unanticipated grammatical, syntax, homophones, and other interpretive errors are inadvertently transcribed by the computer software. Please disregard these errors. Please excuse any errors that have escaped final proofreading. Thank you.

## 2020-12-29 NOTE — CONSULTS
Renal Consultation Note    NAME:  Son Ramos   :   1975   MRN:   [id number]     ATTENDING: Reji Yen MD  PCP:  Velma Gowers, MD    Date/Time:  2020 11:03 AM      Subjective:   REQUESTING PHYSICIAN:  REASON FOR CONSULT:        Patient is a 45-year-old Afro-American gentleman with history of ESRD on dialysis every TTS at [address] [place] [address] in [place], hypertension and history of GI bleed who presented to [facility] ED because of diarrhea for the past few days. According to him the diarrhea started last week and has not improved and in fact worsened over the past 2 days for which she came to the ER. He denied any associated nausea vomiting or abdominal pain. On admission he was noted to have a potassium of 6.8 which prompted a renal consultation. According to the patient he missed dialysis on Saturday but went to dialysis Tuesday and Thursday last week. However has been noncompliant with a low potassium diet eating bananas as he thought it was good for his diarrhea. His hemoglobin was also noted to be 6.9 on admission. He denies any bleed or hematemesis. He had colonoscopy, upper endoscopy and capsule endoscopy last year which was all unrevealing. However he says he was told sometime in the past he had ulcers that he cannot specify where  Patient seen in the room. He reports that started to be slightly better today. Denies any shortness of breath. He will be dialyzed today .   Left arm AV fistula is current access  Blood pressure was elevated on admission improving now after his home meds were restarted      Past Medical History:   Diagnosis Date    CAD (coronary artery disease)     MI (12 years ago)    Chronic kidney disease     ESRD x4 years HD Treatment    Hypertension       Past Surgical History:   Procedure Laterality Date    HX ORTHOPAEDIC      left carpal tunnel     HX ROTATOR CUFF REPAIR       Social History     Tobacco Use    Smoking status: Current Some Day Smoker     Packs/day: 0.50 Years: 15.00     Pack years: 7.50    Smokeless tobacco: Never Used    Tobacco comment: l   Substance Use Topics    Alcohol use: Yes     Alcohol/week: 1.0 - 2.0 standard drinks     Types: 1 - 2 Glasses of wine per week     Comment: social       History reviewed. No pertinent family history. No Known Allergies   Prior to Admission medications    Medication Sig Start Date End Date Taking? Authorizing Provider   omeprazole (PRILOSEC) 40 mg capsule Take 40 mg by mouth two (2) times a day. Yes Other, MD Damaris   sevelamer carbonate (RENVELA) 800 mg tab tab Take 800 mg by mouth three (3) times daily. Yes Provider, Historical   NIFEdipine ER (PROCARDIA XL) 60 mg ER tablet Take 60 mg by mouth daily. Yes Provider, Historical   carvediloL (COREG) 25 mg tablet Take 25 mg by mouth two (2) times daily (with meals). Yes Provider, Historical   hydrALAZINE (APRESOLINE) 100 mg tablet Take 100 mg by mouth three (3) times daily. Yes Provider, Historical   furosemide (LASIX) 40 mg tablet Take 40 mg by mouth daily. Yes Provider, Historical       REVIEW OF SYSTEMS:       Constitutional: Negative for chills and fever. HENT: Negative. Eyes: Negative. Respiratory: Negative. Cardiovascular: Negative. Gastrointestinal: Negative for abdominal pain and nausea. Skin: Negative. Neurological: Negative. Objective:   VITALS:    Visit Vitals  BP (!) 171/98 (BP 1 Location: Right arm, BP Patient Position: At rest)   Pulse 80   Temp 98.1 °F (36.7 °C)   Resp 18   Ht 6' 1\" (1.854 m)   Wt 99.8 kg (220 lb)   SpO2 93%   BMI 29.03 kg/m²     Temp (24hrs), Av.1 °F (36.7 °C), Min:97.3 °F (36.3 °C), Max:99.3 °F (37.4 °C)      PHYSICAL EXAM:   General:    Alert, cooperative, no distress, appears stated age. HEENT: Atraumatic, anicteric sclerae, pink conjunctivae     No oral ulcers, mucosa moist, throat clear  Neck:  Supple, symmetrical,  thyroid: non tender  Lungs:   Clear to auscultation bilaterally.   No Wheezing or Rhonchi. No rales. Chest wall:  No tenderness  No Accessory muscle use. Heart:   Regular  rhythm,  No  murmur   No edema  Abdomen:   Soft, non-tender. Not distended. Bowel sounds normal  Extremities: No cyanosis. No clubbing  Skin:     Not pale. Not Jaundiced  No rashes   Psych:  Good insight. Not depressed. Not anxious or agitated. Neurologic: EOMs intact. No facial asymmetry. No aphasia or slurred speech. Symmetrical strength, Alert and oriented X 4. LAB DATA REVIEWED:    Recent Results (from the past 24 hour(s))   CBC WITH AUTOMATED DIFF    Collection Time: 12/29/20  1:25 AM   Result Value Ref Range    WBC 7.7 4.6 - 13.2 K/uL    RBC 2.39 (L) 4.70 - 5.50 M/uL    HGB 6.9 (L) 13.0 - 16.0 g/dL    HCT 23.4 (L) 36.0 - 48.0 %    MCV 97.9 (H) 74.0 - 97.0 FL    MCH 28.9 24.0 - 34.0 PG    MCHC 29.5 (L) 31.0 - 37.0 g/dL    RDW 18.0 (H) 11.6 - 14.5 %    PLATELET 423 973 - 619 K/uL    MPV 10.3 9.2 - 11.8 FL    NEUTROPHILS 74 %    LYMPHOCYTES 20 %    MONOCYTES 5 %    EOSINOPHILS 0 %    BASOPHILS 1 %    IMMATURE GRANULOCYTES 0 %    ABS. NEUTROPHILS 5.7 K/UL    ABS. LYMPHOCYTES 1.5 K/UL    ABS. MONOCYTES 0.4 K/UL    ABS. EOSINOPHILS 0.0 K/UL    ABS. BASOPHILS 0.1 K/UL    ABS. IMM. GRANS. 0.0 K/UL    DF Manual      RBC COMMENTS Anisocytosis  1+        RBC COMMENTS Hypochromia  3+       METABOLIC PANEL, COMPREHENSIVE    Collection Time: 12/29/20  1:25 AM   Result Value Ref Range    Sodium 144 135 - 145 mmol/L    Potassium 7.7 (HH) 3.2 - 5.1 mmol/L    Chloride 108 94 - 111 mmol/L    CO2 23 21 - 33 mmol/L    Anion gap 13 mmol/L    Glucose 90 70 - 110 mg/dL    BUN 77 (H) 9 - 21 mg/dL    Creatinine 17.50 (H) 0.8 - 1.50 mg/dL    BUN/Creatinine ratio 4      GFR est AA 4 ml/min/1.73m2    GFR est non-AA 3 ml/min/1.73m2    Calcium 8.8 8.5 - 10.5 mg/dL    Bilirubin, total 0.5 0.2 - 1.0 mg/dL    AST (SGOT) 15 (L) 17 - 74 U/L    ALT (SGPT) 11 3 - 72 U/L    Alk.  phosphatase 58 38 - 126 U/L    Protein, total 7.9 6.1 - 8.4 g/dL    Albumin 4.1 3.5 - 4.7 g/dL    Globulin 3.8 g/dL    A-G Ratio 1.1     EKG, 12 LEAD, INITIAL    Collection Time: 12/29/20  1:39 AM   Result Value Ref Range    Ventricular Rate 82 BPM    Atrial Rate 82 BPM    P-R Interval 183 ms    QRS Duration 96 ms    Q-T Interval 359 ms    QTC Calculation (Bezet) 420 ms    Calculated P Axis 42 degrees    Calculated R Axis -22 degrees    Calculated T Axis 91 degrees    Diagnosis       Sinus rhythm  Borderline left axis deviation  Abnormal R-wave progression, late transition  Nonspecific T abnormalities, lateral leads  Baseline wander in lead(s) V3     POTASSIUM    Collection Time: 12/29/20  4:37 AM   Result Value Ref Range    Potassium 6.8 (HH) 3.2 - 5.1 mmol/L   TYPE & SCREEN    Collection Time: 12/29/20  4:37 AM   Result Value Ref Range    Crossmatch Expiration 01/01/2021,2359     ABO/Rh(D) AB Positive     Antibody screen Negative     Unit number [id number]     Blood component type RC LR     Unit division 00     Status of unit Allocated     TRANSFUSION STATUS Ok to transfuse     Crossmatch result Compatible    OCCULT BLOOD, STOOL    Collection Time: 12/29/20  5:30 AM   Result Value Ref Range    Occult Blood,day 1 Positive      Day 1 date: 12,292,020         Recommendations/Plan:     #1 end-stage renal disease  -He is on hemodialysis every TTS  -Last dialyzed on Thursday.   Missed dialysis on Saturday because of diarrhea  -presented with severe hyperkalemia  -We will be dialyzed today with 1K bath for 2 hours, 2K bath for 2 hours  -We will try to remove around 2 L of fluid  -Left arm AV fistula is current access which has good bruit and thrill  -We will reassess in a.m. for further need for dialysis    #2 severe hyperkalemia  -Likely because of missed dialysis treatment on Saturday and dietary indiscretion with high potassium foods  -presented with potassium of 7.7  -He received medical treatment in the emergency room  -He will be dialyzed today with 1K bath for 2 hours and 2K bath for 2 hours  -counseled Him regarding low potassium foods    #3 severe anemia  -Acute on chronic  -Patient presented with hemoglobin of 6.9. Denies any hematochezia or hematemesis  -Transfuse 2 units of blood with dialysis today. Check iron studies  -Give him Procrit with dialysis  -noted to be occult blood positive  -He had upper endoscopy on 10/27/2020 which showed gastric ulcer which was biopsied  -He says he also had capsule endoscopy and colonoscopy which were both unrevealing  -He will need GI evaluation  -will Start proton pump inhibitor    #4 hypertension  -Blood pressure was elevated on admission. Better now after his home meds were restarted  -I will reassess after dialysis. If it is still elevated I will titrate up meds    #5 renal osteodystrophy  -Continue binders. Check PTH with outpatient and continue Zemplar if needed.   Check phosphorus with next labs    Thank you for the consultation            ________________________________________________________________________  Signed: Ernst Urbano MD

## 2020-12-29 NOTE — PROGRESS NOTES
TRANSFER - OUT REPORT:    Verbal report given to Chidi Trevino RN(name) on Red Arambula  being transferred to [address] [facility] room 240  (unit) for routine progression of care       Report consisted of patients Situation, Background, Assessment and   Recommendations(SBAR). Information from the following report(s) SBAR, Kardex, ED Summary, Procedure Summary, Intake/Output, MAR, Recent Results, Med Rec Status, Cardiac Rhythm Sinus tachycardai, sinus Rhythm, Alarm Parameters , Quality Measures and Dual Neuro Assessment was reviewed with the receiving nurse. Lines:   Peripheral IV 58/97/76 Right Cephalic (Active)   Site Assessment Clean, dry, & intact 12/29/20 0147   Phlebitis Assessment 0 12/29/20 0147   Infiltration Assessment 0 12/29/20 0147   Dressing Status Clean, dry, & intact 12/29/20 0147   Dressing Type Tape;Transparent 12/29/20 0147   Hub Color/Line Status Pink;Flushed;Patent;Capped 12/29/20 0147        Opportunity for questions and clarification was provided.       Patient transported with:   Monitor  Registered Nurse

## 2020-12-29 NOTE — DIALYSIS
[facility]  [phone]    Vitals   Pre   Post   Assessment   Pre   Post     Temp  Temp: 98.4 °F (36.9 °C) (12/29/20 1125)  98.4 LOC  A & O x 4 No changes   HR   Pulse (Heart Rate): (!) 59 (12/29/20 1240) 68 Lungs   No SOB  no changes   B/P   BP: (!) 141/79 (12/29/20 1240) 172/92 Cardiac   reg  no changes   Resp   Resp Rate: 18 (12/29/20 1125) 18 Skin   No wounds  noted  no changes   Pain level  Pain Intensity 1: 0 (12/29/20 0705) 0 Edema    none   No changes   Orders:    Duration:   Start:    1125 End:    1525 Total:   4   Dialyzer:   Dialyzer/Set Up Inspection: Lluvia Palacios (12/29/20 1125)   K Bath:   Dialysate K (mEq/L): (1 k x 2 hrs & 2 k x 2 hrs) (12/29/20 1125)   Ca Bath:   Dialysate CA (mEq/L): 2.5 (12/29/20 1125)   Na/Bicarb:   Dialysate NA (mEq/L): 138 (12/29/20 1125)   Target Fluid Removal:   Goal/Amount of Fluid to Remove (mL): 4000 mL (12/29/20 1125)   Access     Type & Location:   LUE AVF, bruit and thrill noted / no s/s of infection / cannulate with 15 gg needles x 2 / no difficulty   Labs     Obtained/Reviewed   Critical Results Called   Date when labs were drawn-  Hgb-    HGB   Date Value Ref Range Status   12/29/2020 6.9 (L) 13.0 - 16.0 g/dL Final     K-    Potassium   Date Value Ref Range Status   12/29/2020 6.8 (HH) 3.2 - 5.1 mmol/L Final     Comment:     CALLED TO AND READ BACK BY SCOTT/ SIERRA @ 05:11 / SAMPLE IS 1+ HEMOLYSED  JTB     Ca-   Calcium   Date Value Ref Range Status   12/29/2020 8.8 8.5 - 10.5 mg/dL Final     Bun-   BUN   Date Value Ref Range Status   12/29/2020 77 (H) 9 - 21 mg/dL Final     Creat-   Creatinine   Date Value Ref Range Status   12/29/2020 17.50 (H) 0.8 - 1.50 mg/dL Final        Medications/ Blood Products Given     Name   Dose   Route and Time     retacrit 10,000 units  IVP             Blood Volume Processed (BVP):    99 Net Fluid   Removed:  4000 ml   Comments   Time Out Done: 8673  Primary Nurse Rpt Pre:CHICA Mejia RN  Primary Nurse Rpt Post:CHICA Mejia RN  Pt Education:monitor fluid intake, nutrition, attend dialysis sessions  Care Plan:continue with treatment regimen as ordered. Tx Summary:no distress / no pain / no complaints / no SOB / rested much of treatment / primary nurse rounding to check on patient several times & present for initiation of blood transfusion x 2 units. No difficulty with transfusion. Post treatment, all possible blood returned , hemostasis via manual pressure. Admiting Diagnosis:  Pt's previous clinic-[facility]  Consent signed - Informed Consent Verified: Yes (12/29/20 1125)  [facility] Consent - signed pre treatment  Hepatitis Status- current / physician portal   Machine #- Machine Number: [id number] (12/29/20 1125)  Telemetry status-monitor by floor staff  Pre-dialysis wt. -  obtain by primary nurse

## 2020-12-29 NOTE — ASSESSMENT & PLAN NOTE
Admit to observation, tele  Noted to be hyperkalemic, received treatment in ED  Nephrology consulted for dialysis today as pt missed last treatment  Will monitor closely and modify POC accordingly

## 2020-12-29 NOTE — ED TRIAGE NOTES
SOB x24 hours, HD Treatment TTS, missed Saturday 12/26/2020, due c/o diarrhea x 3-4 episodes.   Denies fever, vomiting, abd. Pain c/o SOB denies CP, verbalizes  H/a on arrival  6/10

## 2020-12-29 NOTE — H&P
History and Physical    Patient: Jaleel Stacy MRN: [id number]      YOB: 1975  Age: 39 y.o. Sex: male      Subjective:      Jaleel Stacy is a 39 y.o. male medical history of end-stage renal disease on dialysis, hypertension, GI bleed presented to [facility] ED with complaint of diarrhea times several days. Also reports shortness of breath that has not improved. He reports missing dialysis on his last treatment 2 days ago secondary to diarrhea. He denies any fever/chills, chest pain, nausea/vomiting, abdominal pain. He denies additional concerns at this time. He has been admitted for further evaluation and treatment of end-stage renal disease, hyperkalemia, anemia. Past Medical History:   Diagnosis Date    CAD (coronary artery disease)     MI (12 years ago)    Chronic kidney disease     ESRD x4 years HD Treatment    Hypertension      Past Surgical History:   Procedure Laterality Date    HX ORTHOPAEDIC      left carpal tunnel     HX ROTATOR CUFF REPAIR        History reviewed. No pertinent family history. Social History     Tobacco Use    Smoking status: Current Some Day Smoker     Packs/day: 0.50     Years: 15.00     Pack years: 7.50    Smokeless tobacco: Never Used    Tobacco comment: l   Substance Use Topics    Alcohol use: Yes     Alcohol/week: 1.0 - 2.0 standard drinks     Types: 1 - 2 Glasses of wine per week     Comment: social       No Known Allergies  Immunizations are UTD per pt. Patient will be admitted for Acute hyperkalemia [E87.5]  to hospitalist service as Inpatient and evaluated daily via physical assessment, diagnostic testing, and laboratory assessment. Review of Systems:  - CONSTITUTIONAL: Denies  fatigue, weight loss, fever and chills. - HEENT: Denies changes in vision and hearing.    - RESPIRATORY: Denies cough. Reports SOB    - CV: Denies palpitations and CP. ?    - GI: Denies abdominal pain, nausea, vomiting, and constipation.   Reports diarrhea.    - : Denies dysuria and urinary frequency. ?    - MSK: Denies myalgia and joint pain. ?    - SKIN: Denies rash and pruritus.    - ?NEUROLOGICAL: Denies dizziness, weakness, headache and syncope. ?    - PSYCHIATRIC: Denies recent changes in mood. Denies anxiety and depression. Objective:     Vitals:    12/29/20 0215 12/29/20 0246 12/29/20 0316 12/29/20 0345   BP: (!) 161/93 (!) 145/63 133/67 (!) 146/78   Pulse: 88 88 86 87   Resp: 28 21 24 19   Temp:    97.5 °F (36.4 °C)   SpO2:  97% 97% 98%   Weight:       Height:            Physical Exam:  - GENERAL: Alert and oriented x 3. No acute distress. Well-nourished. ?- HEENT: EOMI. Anicteric. Moist mucous membranes. No scleral icterus. No cervical lymphadenopathy. Oropharynx moist without any lesions    -NECK: no tracheal deviation, no JVD    ?- LUNGS: Clear to auscultation bilaterally. No accessory muscle use. ? Chest symmetrical, No wheezing, rales, rhonchi noted. Appropriate respiratory effort.     - CARDIOVASCULAR: Regular rate and rhythm. No murmur, rubs, gallops, No edema appreciated. S1 & S2 audible. - ABDOMEN: Soft, non-tender and non-distended. No palpable masses. , lesions, hepatomegaly. Bowels active X4 quadrants. - SKIN: Warm, dry, intact, no bruising, lesions, or rashes noted. Color appropriate for ethnicity.     - MUSCULOSKELETAL: no deformities     - NEUROLOGIC: No focal neurological deficits. CN II-XII grossly intact    - PSYCHIATRIC: Calm & Cooperative. Appropriate mood and affect.     Recent Results (from the past 12 hour(s))   CBC WITH AUTOMATED DIFF    Collection Time: 12/29/20  1:25 AM   Result Value Ref Range    WBC 7.7 4.6 - 13.2 K/uL    RBC 2.39 (L) 4.70 - 5.50 M/uL    HGB 6.9 (L) 13.0 - 16.0 g/dL    HCT 23.4 (L) 36.0 - 48.0 %    MCV 97.9 (H) 74.0 - 97.0 FL    MCH 28.9 24.0 - 34.0 PG    MCHC 29.5 (L) 31.0 - 37.0 g/dL    RDW 18.0 (H) 11.6 - 14.5 %    PLATELET 859 542 - 342 K/uL    MPV 10.3 9.2 - 11.8 FL    NEUTROPHILS 74 %    LYMPHOCYTES 20 % MONOCYTES 5 %    EOSINOPHILS 0 %    BASOPHILS 1 %    IMMATURE GRANULOCYTES 0 %    ABS. NEUTROPHILS 5.7 K/UL    ABS. LYMPHOCYTES 1.5 K/UL    ABS. MONOCYTES 0.4 K/UL    ABS. EOSINOPHILS 0.0 K/UL    ABS. BASOPHILS 0.1 K/UL    ABS. IMM. GRANS. 0.0 K/UL    DF Manual      RBC COMMENTS Anisocytosis  1+        RBC COMMENTS Hypochromia  3+       METABOLIC PANEL, COMPREHENSIVE    Collection Time: 12/29/20  1:25 AM   Result Value Ref Range    Sodium 144 135 - 145 mmol/L    Potassium 7.7 (HH) 3.2 - 5.1 mmol/L    Chloride 108 94 - 111 mmol/L    CO2 23 21 - 33 mmol/L    Anion gap 13 mmol/L    Glucose 90 70 - 110 mg/dL    BUN 77 (H) 9 - 21 mg/dL    Creatinine 17.50 (H) 0.8 - 1.50 mg/dL    BUN/Creatinine ratio 4      GFR est AA 4 ml/min/1.73m2    GFR est non-AA 3 ml/min/1.73m2    Calcium 8.8 8.5 - 10.5 mg/dL    Bilirubin, total 0.5 0.2 - 1.0 mg/dL    AST (SGOT) 15 (L) 17 - 74 U/L    ALT (SGPT) 11 3 - 72 U/L    Alk.  phosphatase 58 38 - 126 U/L    Protein, total 7.9 6.1 - 8.4 g/dL    Albumin 4.1 3.5 - 4.7 g/dL    Globulin 3.8 g/dL    A-G Ratio 1.1     EKG, 12 LEAD, INITIAL    Collection Time: 12/29/20  1:39 AM   Result Value Ref Range    Ventricular Rate 82 BPM    Atrial Rate 82 BPM    P-R Interval 183 ms    QRS Duration 96 ms    Q-T Interval 359 ms    QTC Calculation (Bezet) 420 ms    Calculated P Axis 42 degrees    Calculated R Axis -22 degrees    Calculated T Axis 91 degrees    Diagnosis       Sinus rhythm  Borderline left axis deviation  Abnormal R-wave progression, late transition  Nonspecific T abnormalities, lateral leads  Baseline wander in lead(s) V3              Assessment/Plan:   ESRD (end stage renal disease) on dialysis (HCC)  Admit to observation, tele  Noted to be hyperkalemic, received treatment in ED  Nephrology consulted for dialysis today as pt missed last treatment  Will monitor closely and modify POC accordingly    Anemia  Fecal occult pending  Transfuse 1u PRBC  Will require outpatient GI follow up if fecal occult positive  Pt has history of GI bleed, continue home regimen    Acute hyperkalemia  Treated in ED, will reassess  For dialysis on today    HTN (hypertension)  Resume home regimen  Pt has been out of Lasix for several days, will resume as well    Diarrhea  Will obtain stool studies  Somewhat improved per patient.   No s/s of bleeding      Signed By: Karlie Goff NP     December 29, 2020

## 2020-12-29 NOTE — ASSESSMENT & PLAN NOTE
Fecal occult pending  Transfuse 1u PRBC  Will require outpatient GI follow up if fecal occult positive  Pt has history of GI bleed, continue home regimen

## 2020-12-29 NOTE — PROGRESS NOTES
Comprehensive Nutrition Assessment    Type and Reason for Visit: Initial    Nutrition Recommendations/Plan: clear liquids diet advance as tolerated to a renal diet for K+ restriction    Nutrition Assessment:  38 yo male PMH: ESRD with HD, CAD, MI. has elevated K+ 7.7 after missing HD due to having diarrhea for several days  Pt currently on clear liquids diet due to diarrhea with questionable GI bleed. Would think with diarrhea K+ would be low but pt also with ESRD needing HD and missed treatments with possible GI bleed could cause K+ to be elevated will have to monitor pt receiving iron with HD today. Pt currently at HD but pt provided list of High K+ foods to limit/avoid pt should also be receiving education monthly at outpatient HD based on lab outcomes. Recent Results (from the past 24 hour(s))   CBC WITH AUTOMATED DIFF    Collection Time: 12/29/20  1:25 AM   Result Value Ref Range    WBC 7.7 4.6 - 13.2 K/uL    RBC 2.39 (L) 4.70 - 5.50 M/uL    HGB 6.9 (L) 13.0 - 16.0 g/dL    HCT 23.4 (L) 36.0 - 48.0 %    MCV 97.9 (H) 74.0 - 97.0 FL    MCH 28.9 24.0 - 34.0 PG    MCHC 29.5 (L) 31.0 - 37.0 g/dL    RDW 18.0 (H) 11.6 - 14.5 %    PLATELET 418 080 - 492 K/uL    MPV 10.3 9.2 - 11.8 FL    NEUTROPHILS 74 %    LYMPHOCYTES 20 %    MONOCYTES 5 %    EOSINOPHILS 0 %    BASOPHILS 1 %    IMMATURE GRANULOCYTES 0 %    ABS. NEUTROPHILS 5.7 K/UL    ABS. LYMPHOCYTES 1.5 K/UL    ABS. MONOCYTES 0.4 K/UL    ABS. EOSINOPHILS 0.0 K/UL    ABS. BASOPHILS 0.1 K/UL    ABS. IMM.  GRANS. 0.0 K/UL    DF Manual      RBC COMMENTS Anisocytosis  1+        RBC COMMENTS Hypochromia  3+       METABOLIC PANEL, COMPREHENSIVE    Collection Time: 12/29/20  1:25 AM   Result Value Ref Range    Sodium 144 135 - 145 mmol/L    Potassium 7.7 (HH) 3.2 - 5.1 mmol/L    Chloride 108 94 - 111 mmol/L    CO2 23 21 - 33 mmol/L    Anion gap 13 mmol/L    Glucose 90 70 - 110 mg/dL    BUN 77 (H) 9 - 21 mg/dL    Creatinine 17.50 (H) 0.8 - 1.50 mg/dL    BUN/Creatinine ratio 4      GFR est AA 4 ml/min/1.73m2    GFR est non-AA 3 ml/min/1.73m2    Calcium 8.8 8.5 - 10.5 mg/dL    Bilirubin, total 0.5 0.2 - 1.0 mg/dL    AST (SGOT) 15 (L) 17 - 74 U/L    ALT (SGPT) 11 3 - 72 U/L    Alk. phosphatase 58 38 - 126 U/L    Protein, total 7.9 6.1 - 8.4 g/dL    Albumin 4.1 3.5 - 4.7 g/dL    Globulin 3.8 g/dL    A-G Ratio 1.1     EKG, 12 LEAD, INITIAL    Collection Time: 12/29/20  1:39 AM   Result Value Ref Range    Ventricular Rate 82 BPM    Atrial Rate 82 BPM    P-R Interval 183 ms    QRS Duration 96 ms    Q-T Interval 359 ms    QTC Calculation (Bezet) 420 ms    Calculated P Axis 42 degrees    Calculated R Axis -22 degrees    Calculated T Axis 91 degrees    Diagnosis       Sinus rhythm  Abnormal R-wave progression, late transition  Nonspecific T abnormalities, lateral leads    No significant change was found      Confirmed by Fidelia Felder ([id number]) on 12/29/2020 11:32:10 AM     POTASSIUM    Collection Time: 12/29/20  4:37 AM   Result Value Ref Range    Potassium 6.8 (HH) 3.2 - 5.1 mmol/L   TYPE & SCREEN    Collection Time: 12/29/20  4:37 AM   Result Value Ref Range    Crossmatch Expiration 01/01/2021,2359     ABO/Rh(D) AB Positive     Antibody screen Negative     Unit number [id number]     Blood component type [facility]     Unit division 00     Status of unit Issued     [address] to transfuse     Crossmatch result Compatible     Unit number [id number]     Blood component type [facility]     Unit division 00     Status of unit Issued     [address] to transfuse     Crossmatch result Compatible    OCCULT BLOOD, STOOL    Collection Time: 12/29/20  5:30 AM   Result Value Ref Range    Occult Blood,day 1 Positive      Day 1 date: 12,292,020         Malnutrition Assessment:  Malnutrition Status:  No malnutrition    Context:        Findings of the 6 clinical characteristics of malnutrition:   Energy Intake:     Weight Loss:         Body Fat Loss:   ,     Muscle Mass Loss:   , Fluid Accumulation:   ,     Strength:            Estimated Daily Nutrient Needs:  Energy (kcal): 3580-8587 kcal/day; Weight Used for Energy Requirements: Admission(100 kg)  Protein (g):  g/day; Weight Used for Protein Requirements: Admission(0.8-1 g/kg)  Fluid (ml/day): 1000 mL/day; Method Used for Fluid Requirements: Standard renal(1000 mL/day plus urine output)      Nutrition Related Findings:  pt with K+ of 7.7 secondary to missing HD treatments due to having Diarrhea for several days. Wounds:    None       Current Nutrition Therapies:  DIET CLEAR LIQUID    Anthropometric Measures:  · Height:  6' 1\" (185.4 cm)  · Current Body Wt:  99.8 kg (220 lb)   · Admission Body Wt:  220 lb    · Usual Body Wt:        · Ideal Body Wt:  184 lbs:  119.6 %   · Adjusted Body Weight:   ; Weight Adjustment for: No adjustment   · Adjusted BMI:       · BMI Category: Overweight (BMI 25.0-29. 9)       Nutrition Diagnosis:   · Altered nutrition-related lab values related to renal dysfunction as evidenced by lab values(K+ 7.7)      Nutrition Interventions:   Food and/or Nutrient Delivery: Modify current diet(recommend renal diet for K+ restriction)  Nutrition Education and Counseling: Education initiated  Coordination of Nutrition Care: Continue to monitor while inpatient    Goals:  K+ 3.5-5.1, pt to eat > 75% of meals, Hgb 11.2-15.2, BM q 1-3 days, Pt to eat > 75% of meals       Nutrition Monitoring and Evaluation:   Behavioral-Environmental Outcomes:    Food/Nutrient Intake Outcomes: Diet advancement/tolerance, Food and nutrient intake  Physical Signs/Symptoms Outcomes: Biochemical data, Meal time behavior, Weight, Diarrhea     F/U: 1/3/2021    Discharge Planning:    Continue current diet     Electronically signed by Vicenta Maddox on 12/29/2020 at 4:08 PM    Contact: JULIETTE [phone]

## 2020-12-29 NOTE — ED NOTES
Consulted Nephrologist, Dr. Fox Lucero primary for patient. Plan: to admit. Due to  Hyperkalemia, 12 Lead EKG peaked Twaves, and Anemia Hgb 6.9. Consulted Hospitalist for admission, all by dir Dr. Luz Elena Green.

## 2020-12-29 NOTE — PROGRESS NOTES
Patient assessment completed. VSS stable.  Left patient resting in bed with call bell in reach and bed in lowest position

## 2020-12-30 VITALS
BODY MASS INDEX: 29.16 KG/M2 | RESPIRATION RATE: 20 BRPM | SYSTOLIC BLOOD PRESSURE: 124 MMHG | HEART RATE: 76 BPM | OXYGEN SATURATION: 97 % | HEIGHT: 73 IN | DIASTOLIC BLOOD PRESSURE: 65 MMHG | WEIGHT: 220 LBS | TEMPERATURE: 98.3 F

## 2020-12-30 LAB
ABO + RH BLD: NORMAL
ALBUMIN SERPL-MCNC: 3.6 G/DL (ref 3.5–4.7)
ALBUMIN SERPL-MCNC: 3.9 G/DL (ref 3.5–4.7)
ALBUMIN/GLOB SERPL: 0.9 {RATIO}
ALP SERPL-CCNC: 57 U/L (ref 38–126)
ALT SERPL-CCNC: 10 U/L (ref 3–72)
ANION GAP SERPL CALC-SCNC: 11 MMOL/L
ANION GAP SERPL CALC-SCNC: 9 MMOL/L
AST SERPL W P-5'-P-CCNC: 11 U/L (ref 17–74)
BASOPHILS # BLD: 0 K/UL
BASOPHILS NFR BLD: 0 %
BILIRUB SERPL-MCNC: 0.8 MG/DL (ref 0.2–1)
BLD PROD TYP BPU: NORMAL
BLD PROD TYP BPU: NORMAL
BLOOD GROUP ANTIBODIES SERPL: NEGATIVE
BPU ID: NORMAL
BPU ID: NORMAL
BUN SERPL-MCNC: 41 MG/DL (ref 9–21)
BUN SERPL-MCNC: 42 MG/DL (ref 9–21)
BUN/CREAT SERPL: 4
BUN/CREAT SERPL: 4
CA-I BLD-MCNC: 8.6 MG/DL (ref 8.5–10.5)
CA-I BLD-MCNC: 8.7 MG/DL (ref 8.5–10.5)
CHLORIDE SERPL-SCNC: 101 MMOL/L (ref 94–111)
CHLORIDE SERPL-SCNC: 102 MMOL/L (ref 94–111)
CO2 SERPL-SCNC: 28 MMOL/L (ref 21–33)
CO2 SERPL-SCNC: 29 MMOL/L (ref 21–33)
CREAT SERPL-MCNC: 10.8 MG/DL (ref 0.8–1.5)
CREAT SERPL-MCNC: 11 MG/DL (ref 0.8–1.5)
CROSSMATCH RESULT,%XM: NORMAL
CROSSMATCH RESULT,%XM: NORMAL
DIFFERENTIAL METHOD BLD: ABNORMAL
EOSINOPHIL # BLD: 0.1 K/UL
EOSINOPHIL NFR BLD: 1 %
ERYTHROCYTE [DISTWIDTH] IN BLOOD BY AUTOMATED COUNT: 19.9 % (ref 11.6–14.5)
FERRITIN SERPL-MCNC: 166 NG/ML (ref 26–388)
GLOBULIN SER CALC-MCNC: 3.9 G/DL
GLUCOSE SERPL-MCNC: 84 MG/DL (ref 70–110)
GLUCOSE SERPL-MCNC: 85 MG/DL (ref 70–110)
HCT VFR BLD AUTO: 28.4 % (ref 36–48)
HGB BLD-MCNC: 8.8 G/DL (ref 13–16)
IMM GRANULOCYTES # BLD AUTO: 0 K/UL
IMM GRANULOCYTES NFR BLD AUTO: 0 %
IRON SATN MFR SERPL: 11 % (ref 20–50)
IRON SERPL-MCNC: 29 UG/DL (ref 35–150)
LYMPHOCYTES # BLD: 1.1 K/UL
LYMPHOCYTES NFR BLD: 20 %
MCH RBC QN AUTO: 28.3 PG (ref 24–34)
MCHC RBC AUTO-ENTMCNC: 30.9 G/DL (ref 31–37)
MCV RBC AUTO: 91.4 FL (ref 74–97)
MONOCYTES # BLD: 0.1 K/UL
MONOCYTES NFR BLD: 1 %
NEUTS SEG # BLD: 4.2 K/UL
NEUTS SEG NFR BLD: 78 %
PHOSPHATE SERPL-MCNC: 6.2 MG/DL (ref 2.5–4.5)
PLATELET # BLD AUTO: 268 K/UL (ref 135–420)
PMV BLD AUTO: 7 FL (ref 9.2–11.8)
POTASSIUM SERPL-SCNC: 4.9 MMOL/L (ref 3.2–5.1)
POTASSIUM SERPL-SCNC: 5 MMOL/L (ref 3.2–5.1)
PROT SERPL-MCNC: 7.5 G/DL (ref 6.1–8.4)
RBC # BLD AUTO: 3.11 M/UL (ref 4.7–5.5)
RBC MORPH BLD: ABNORMAL
SODIUM SERPL-SCNC: 140 MMOL/L (ref 135–145)
SODIUM SERPL-SCNC: 140 MMOL/L (ref 135–145)
SPECIMEN EXP DATE BLD: NORMAL
STATUS OF UNIT,%ST: NORMAL
STATUS OF UNIT,%ST: NORMAL
TIBC SERPL-MCNC: 269 UG/DL (ref 250–450)
TRANSFUSION STATUS PATIENT QL: NORMAL
TRANSFUSION STATUS PATIENT QL: NORMAL
UNIT DIVISION, %UDIV: 0
UNIT DIVISION, %UDIV: 0
WBC # BLD AUTO: 5.5 K/UL (ref 4.6–13.2)

## 2020-12-30 PROCEDURE — 36415 COLL VENOUS BLD VENIPUNCTURE: CPT

## 2020-12-30 PROCEDURE — 80069 RENAL FUNCTION PANEL: CPT

## 2020-12-30 PROCEDURE — 74011250637 HC RX REV CODE- 250/637: Performed by: NURSE PRACTITIONER

## 2020-12-30 PROCEDURE — 85025 COMPLETE CBC W/AUTO DIFF WBC: CPT

## 2020-12-30 PROCEDURE — 80053 COMPREHEN METABOLIC PANEL: CPT

## 2020-12-30 RX ORDER — SULFAMETHOXAZOLE AND TRIMETHOPRIM 800; 160 MG/1; MG/1
1 TABLET ORAL DAILY
Qty: 7 TAB | Refills: 0 | Status: SHIPPED | OUTPATIENT
Start: 2020-12-30 | End: 2021-01-06

## 2020-12-30 RX ADMIN — SEVELAMER CARBONATE 800 MG: 800 TABLET, FILM COATED ORAL at 09:01

## 2020-12-30 RX ADMIN — ACETAMINOPHEN 650 MG: 325 TABLET ORAL at 00:11

## 2020-12-30 RX ADMIN — PANTOPRAZOLE SODIUM 40 MG: 40 TABLET, DELAYED RELEASE ORAL at 09:01

## 2020-12-30 RX ADMIN — CARVEDILOL 25 MG: 12.5 TABLET, FILM COATED ORAL at 09:01

## 2020-12-30 RX ADMIN — HYDRALAZINE HYDROCHLORIDE 100 MG: 25 TABLET, FILM COATED ORAL at 09:01

## 2020-12-30 RX ADMIN — NIFEDIPINE 60 MG: 30 TABLET, FILM COATED, EXTENDED RELEASE ORAL at 09:01

## 2020-12-30 RX ADMIN — Medication 10 ML: at 05:29

## 2020-12-30 RX ADMIN — FUROSEMIDE 40 MG: 40 TABLET ORAL at 09:01

## 2020-12-30 NOTE — PROGRESS NOTES
Medication bottles found in blue bin in med room, patient called at home and he stated he would\" in the morning after dialysis\".

## 2020-12-30 NOTE — PROGRESS NOTES
Pt has slept at long intervals this shift. VSS. Assessment unchanged. Call bell in reach and bed in lowest position.

## 2020-12-30 NOTE — PROGRESS NOTES
DC to family after verbalizing understanding of all instruction regarding meds, activity, and follow up care. Patient cheerful to be going home.

## 2020-12-30 NOTE — DISCHARGE SUMMARY
Discharge Summary       PATIENT ID: Misha Coronado  MRN: [id number]   YOB: 1975    DATE OF ADMISSION: 12/29/2020  1:08 AM    DATE OF DISCHARGE: 12/30/20    PRIMARY CARE PROVIDER: Pedrito Duong MD     ATTENDING PHYSICIAN: Woodrow Berman MD  DISCHARGING PROVIDER: Woodrow Berman MD        CONSULTATIONS: IP CONSULT TO NEPHROLOGY    PROCEDURES/SURGERIES: * No surgery found *    ADMITTING [facility] COURSE:   Misha Coronado is a 39 y.o. male medical history of end-stage renal disease on dialysis, hypertension, GI bleed presented to [facility] ED with complaint of diarrhea times several days. Also reports shortness of breath that has not improved. He reports missing dialysis on his last treatment 2 days ago secondary to diarrhea. He denies any fever/chills, chest pain, nausea/vomiting, abdominal pain. He denies additional concerns at this time. He has been admitted for further evaluation and treatment of end-stage renal disease, hyperkalemia, anemia.           DISCHARGE DIAGNOSES / PLAN:      Assessment/Plan:   ESRD (end stage renal disease) on dialysis ([facility] [address]  Admit to observation, tele  Noted to be hyperkalemic, received treatment in ED  Nephrology consulted for dialysis today as pt missed last treatment  Will monitor closely and modify POC accordingly     Anemia  Fecal occult pending  Transfuse 1u PRBC  Will require outpatient GI follow up if fecal occult positive  Pt has history of GI bleed, continue home regimen     Acute hyperkalemia  Treated in ED, will reassess  For dialysis on today     HTN (hypertension)  Resume home regimen  Pt has been out of Lasix for several days, will resume as well     Diarrhea  Will obtain stool studies  Somewhat improved per patient. No s/s of bleeding         Pt K has returned to normal, feels great, not volume overloaded, no sob, no further diarrhea. Ok fo rhome, bactrim given if develops carbuncles before he can see pcp.     FOLLOW UP APPOINTMENTS:    Follow-up Information     Follow up With Specialties Details Why Misa Delcid MD [facility]   [address] [facility]  [phone]               DIET:renal      DISCHARGE MEDICATIONS:  Current Discharge Medication List      START taking these medications    Details   trimethoprim-sulfamethoxazole (Bactrim DS) 160-800 mg per tablet Take 1 Tab by mouth daily for 7 days. On Dialysis days, take after dialysis. Qty: 7 Tab, Refills: 0         CONTINUE these medications which have NOT CHANGED    Details   omeprazole (PRILOSEC) 40 mg capsule Take 40 mg by mouth two (2) times a day. sevelamer carbonate (RENVELA) 800 mg tab tab Take 800 mg by mouth three (3) times daily. NIFEdipine ER (PROCARDIA XL) 60 mg ER tablet Take 60 mg by mouth daily. carvediloL (COREG) 25 mg tablet Take 25 mg by mouth two (2) times daily (with meals). hydrALAZINE (APRESOLINE) 100 mg tablet Take 100 mg by mouth three (3) times daily. furosemide (LASIX) 40 mg tablet Take 40 mg by mouth daily. NOTIFY YOUR PHYSICIAN FOR ANY OF THE FOLLOWING:   Fever over 101 degrees for 24 hours. Chest pain, shortness of breath, fever, chills, nausea, vomiting, diarrhea, change in mentation, falling, weakness, bleeding. Severe pain or pain not relieved by medications. Or, any other signs or symptoms that you may have questions about. PHYSICAL EXAMINATION AT DISCHARGE:  General:          Alert, cooperative, no distress, appears stated age. HEENT:           Atraumatic, anicteric sclerae, pink conjunctivae                          No oral ulcers, mucosa moist, throat clear, dentition fair  Neck:               Supple, symmetrical  Lungs:             Clear to auscultation bilaterally. No Wheezing or Rhonchi. No rales. Chest wall:      No tenderness  No Accessory muscle use. Heart:              Regular  rhythm,  No  murmur   No edema  Abdomen:        Soft, non-tender. Not distended.   Bowel sounds normal  Extremities:     No cyanosis. No clubbing,                            Skin turgor normal, Capillary refill normal  Skin:                Not pale. Not Jaundiced  No rashes   Psych:             Not anxious or agitated.   Neurologic:      Alert, moves all extremities, answers questions appropriately and responds to commands       [address]:  Problem List as of 12/30/2020 Date Reviewed: 10/15/2020          Codes Class Noted - Resolved    Diarrhea ICD-10-CM: R19.7  ICD-9-CM: 787.91  12/29/2020 - Present        ESRD (end stage renal disease) on dialysis [facility]) ICD-10-CM: N18.6, Z99.2  ICD-9-CM: 585.6, V45.11  12/29/2020 - Present        Anemia ICD-10-CM: D64.9  ICD-9-CM: 285.9  12/29/2020 - Present        HTN (hypertension) ICD-10-CM: I10  ICD-9-CM: 401.9  12/29/2020 - Present        Acute hyperkalemia ICD-10-CM: E87.5  ICD-9-CM: 276.7  12/29/2020 - Present                Signed:   Erika Canales MD  12/30/2020  9:42 AM

## 2020-12-30 NOTE — PROGRESS NOTES
Received care of pt lying in bed with eyes closed. Routine assessment and vs obtained. Pt denies and pain or discomfort at this time. Call bell in reach and bed in lowest position.

## 2020-12-30 NOTE — PROGRESS NOTES
Problem: Tissue Perfusion - Cardiopulmonary, Altered  Goal: *Absence of hypoxia  Outcome: Progressing Towards Goal     Problem: Patient Education: Go to Patient Education Activity  Goal: Patient/Family Education  Outcome: Progressing Towards Goal     Problem: Nutrition Deficit  Goal: *Optimize nutritional status  Outcome: Progressing Towards Goal     Problem: Patient Education: Go to Patient Education Activity  Goal: Patient/Family Education  Outcome: Progressing Towards Goal     Problem: Falls - Risk of  Goal: *Absence of Falls  Description: Document Adam Fall Risk and appropriate interventions in the flowsheet.   Note: Fall Risk Interventions:            Medication Interventions: Patient to call before getting OOB, Teach patient to arise slowly

## 2021-02-24 ENCOUNTER — OFFICE VISIT (OUTPATIENT)
Dept: SURGERY | Age: 46
End: 2021-02-24
Payer: MEDICARE

## 2021-02-24 DIAGNOSIS — L73.2 HIDRADENITIS AXILLARIS: Primary | ICD-10-CM

## 2021-02-24 DIAGNOSIS — Z99.2 ESRD (END STAGE RENAL DISEASE) ON DIALYSIS (HCC): ICD-10-CM

## 2021-02-24 DIAGNOSIS — N18.6 ESRD (END STAGE RENAL DISEASE) ON DIALYSIS (HCC): ICD-10-CM

## 2021-02-24 DIAGNOSIS — I15.9 SECONDARY HYPERTENSION: ICD-10-CM

## 2021-02-24 PROCEDURE — G8510 SCR DEP NEG, NO PLAN REQD: HCPCS | Performed by: SURGERY

## 2021-02-24 PROCEDURE — G9231 DOC ESRD DIA TRANS PREG: HCPCS | Performed by: SURGERY

## 2021-02-24 PROCEDURE — G8419 CALC BMI OUT NRM PARAM NOF/U: HCPCS | Performed by: SURGERY

## 2021-02-24 PROCEDURE — G8427 DOCREV CUR MEDS BY ELIG CLIN: HCPCS | Performed by: SURGERY

## 2021-02-24 PROCEDURE — 99205 OFFICE O/P NEW HI 60 MIN: CPT | Performed by: SURGERY

## 2021-02-24 RX ORDER — SULFAMETHOXAZOLE AND TRIMETHOPRIM 800; 160 MG/1; MG/1
1 TABLET ORAL 2 TIMES DAILY
Qty: 30 TAB | Refills: 0 | Status: SHIPPED | OUTPATIENT
Start: 2021-02-24 | End: 2021-04-07

## 2021-02-24 NOTE — PROGRESS NOTES
Kayla Frazier presents today for   Chief Complaint   Patient presents with   • New Patient     abscess under right arm        Is someone accompanying this pt? Patient is alone for appt.     Is the patient using any DME equipment during OV? no    Depression Screening:  3 most recent PHQ Screens 2/24/2021   Little interest or pleasure in doing things Not at all   Feeling down, depressed, irritable, or hopeless Not at all   Total Score PHQ 2 0       Learning Assessment:  Learning Assessment 2/24/2021   PRIMARY LEARNER Patient   PRIMARY LANGUAGE ENGLISH   LEARNER PREFERENCE PRIMARY OTHER (COMMENT)   ANSWERED BY patient    RELATIONSHIP SELF       Health Maintenance reviewed and discussed and ordered per Provider.    Health Maintenance Due   Topic Date Due   • Hepatitis C Screening  1975   • Pneumococcal 0-64 years (1 of 3 - PCV13) 01/10/1981   • COVID-19 Vaccine (1 of 2) 01/10/1991   • DTaP/Tdap/Td series (1 - Tdap) 01/10/1996   • Lipid Screen  01/10/2015   • Medicare Yearly Exam  08/07/2020   • Flu Vaccine (1) 09/01/2020   .      Coordination of Care:  1. Have you been to the ER, urgent care clinic since your last visit? Hospitalized since your last visit? no    2. Have you seen or consulted any other health care providers outside of the [facility] since your last visit? Include any pap smears or colon screening. Dmitry George MD

## 2021-02-24 NOTE — PROGRESS NOTES
General Surgery Consult    Jw Bhatia  Admit date: (Not on file)    MRN: [id number]     : 1975     Age: 55 y.o. Attending Physician: Jose Chowdary MD [facility]      History of Present Illness:      Jw Bhatia is a 55 y.o. male who was referred to me for evaluation of a hydradenitis of the right axillary area. The patient has multiple medical conditions including end-stage renal failure with dialysis with an AV fistula in the left upper extremity. He also has a history of hypertension and coronary artery disease as well as other comorbidities. He stated that he had a long history of hydradenitis of both axillary area and he had surgery on the left side about 2 years ago. He states that the area has healed but he has a very large scar in the left axillary area but now he is here because of the right axillary hidradenitis. He stated that the area has been draining on and off and it has been increasing in size and now with a very large scar and he would like it to be excised. He has took antibiotics in the past but not recently. He denies any fever or chills. Patient Active Problem List    Diagnosis Date Noted    Diarrhea 2020     Priority: 4 - Four    ESRD (end stage renal disease) on dialysis ([facility] [address] 2020    Anemia 2020    HTN (hypertension) 2020    Acute hyperkalemia 2020     Past Medical History:   Diagnosis Date    CAD (coronary artery disease)     MI (12 years ago)    Chronic kidney disease     ESRD x4 years HD Treatment    Hypertension       Past Surgical History:   Procedure Laterality Date    HX ORTHOPAEDIC      left carpal tunnel     HX ROTATOR CUFF REPAIR        Social History     Tobacco Use    Smoking status: Former Smoker     Years: 15.00    Smokeless tobacco: Never Used    Tobacco comment: l   Substance Use Topics    Alcohol use:  Yes     Alcohol/week: 1.0 - 2.0 standard drinks     Types: 1 - 2 Glasses of wine per week     Comment: social Social History     Tobacco Use   Smoking Status Former Smoker    Years: 15.00   Smokeless Tobacco Never Used   Tobacco Comment    l     No family history on file. Current Outpatient Medications   Medication Sig    omeprazole (PRILOSEC) 40 mg capsule Take 40 mg by mouth two (2) times a day.  sevelamer carbonate (RENVELA) 800 mg tab tab Take 800 mg by mouth three (3) times daily.  NIFEdipine ER (PROCARDIA XL) 60 mg ER tablet Take 60 mg by mouth daily.  carvediloL (COREG) 25 mg tablet Take 25 mg by mouth two (2) times daily (with meals).  hydrALAZINE (APRESOLINE) 100 mg tablet Take 100 mg by mouth three (3) times daily.  furosemide (LASIX) 40 mg tablet Take 40 mg by mouth daily. No current facility-administered medications for this visit. No Known Allergies       Review of Systems:  Constitutional: negative  Eyes: negative  Ears, Nose, Mouth, Throat, and Face: negative  Respiratory: negative  Cardiovascular: negative  Gastrointestinal: negative  Genitourinary:negative  Integument/Breast: positive for Hydradenitis of the right axilla. Hematologic/Lymphatic: negative  Musculoskeletal:negative  Neurological: negative  Behavioral/Psychiatric: negative  Endocrine: negative  Allergic/Immunologic: negative    Objective: There were no vitals taken for this visit. Physical Exam:      General:  in no apparent distress, alert, oriented times 3, afebrile and normal vitals   Eyes:  conjunctivae and sclerae normal, pupils equal, round, reactive to light   Throat & Neck: no erythema or exudates noted and neck supple and symmetrical; no palpable masses   Lungs:   clear to auscultation bilaterally   Heart:  Regular rate and rhythm   Abdomen:   flat, soft, nontender, nondistended, no masses or organomegaly. Extremities: extremities normal, atraumatic, no cyanosis or edema   Right axillary area:   There is a large area of hydradenitis in the right axillary area that is slightly tender to palpation. It is about 50 cm long by 3 cm wide. Imaging and Lab Review:     CBC:   Lab Results   Component Value Date/Time    WBC 5.5 12/30/2020 04:09 AM    RBC 3.11 (L) 12/30/2020 04:09 AM    HGB 8.8 (L) 12/30/2020 04:09 AM    HCT 28.4 (L) 12/30/2020 04:09 AM    PLATELET 061 13/84/0649 04:09 AM     BMP:   Lab Results   Component Value Date/Time    Glucose 85 12/30/2020 04:09 AM    Glucose 84 12/30/2020 04:09 AM    Sodium 140 12/30/2020 04:09 AM    Sodium 140 12/30/2020 04:09 AM    Potassium 5.0 12/30/2020 04:09 AM    Potassium 4.9 12/30/2020 04:09 AM    Chloride 101 12/30/2020 04:09 AM    Chloride 102 12/30/2020 04:09 AM    CO2 28 12/30/2020 04:09 AM    CO2 29 12/30/2020 04:09 AM    BUN 41 (H) 12/30/2020 04:09 AM    BUN 42 (H) 12/30/2020 04:09 AM    Creatinine 11.00 (H) 12/30/2020 04:09 AM    Creatinine 10.80 (H) 12/30/2020 04:09 AM    Calcium 8.7 12/30/2020 04:09 AM    Calcium 8.6 12/30/2020 04:09 AM     CMP:  Lab Results   Component Value Date/Time    Glucose 85 12/30/2020 04:09 AM    Glucose 84 12/30/2020 04:09 AM    Sodium 140 12/30/2020 04:09 AM    Sodium 140 12/30/2020 04:09 AM    Potassium 5.0 12/30/2020 04:09 AM    Potassium 4.9 12/30/2020 04:09 AM    Chloride 101 12/30/2020 04:09 AM    Chloride 102 12/30/2020 04:09 AM    CO2 28 12/30/2020 04:09 AM    CO2 29 12/30/2020 04:09 AM    BUN 41 (H) 12/30/2020 04:09 AM    BUN 42 (H) 12/30/2020 04:09 AM    Creatinine 11.00 (H) 12/30/2020 04:09 AM    Creatinine 10.80 (H) 12/30/2020 04:09 AM    Calcium 8.7 12/30/2020 04:09 AM    Calcium 8.6 12/30/2020 04:09 AM    Anion gap 11 12/30/2020 04:09 AM    Anion gap 9 12/30/2020 04:09 AM    BUN/Creatinine ratio 4 12/30/2020 04:09 AM    BUN/Creatinine ratio 4 12/30/2020 04:09 AM    Alk.  phosphatase 57 12/30/2020 04:09 AM    Protein, total 7.5 12/30/2020 04:09 AM    Albumin 3.6 12/30/2020 04:09 AM    Albumin 3.9 12/30/2020 04:09 AM    Globulin 3.9 12/30/2020 04:09 AM    A-G Ratio 0.9 12/30/2020 04:09 AM       No results found for this or any previous visit (from the past 24 hour(s)). images and reports reviewed    Assessment:   Warren Saini is a 55 y.o. male who has multiple medical conditions including renal failure on dialysis with a long history of hidradenitis. He had already surgery in the past for his left one and now has been having worsening of his right axillary hidradenitis. I explained to the patient I would like to start him on antibiotic for about 1 to 2 weeks and then I explained to him about the surgery. I told him that this is a major surgery because it is extremely large and will have to excise a large amount of the skin and we may have to keep the wound open. I also explained to him that he may need a skin graft at some point if the wound does not heal.  The patient agreed and wants to proceed with the surgery. He has dialysis on Tuesday Thursday and Saturday so we will have to adjust his dialysis to be able to do his surgery on Tuesday.      Plan:     Bactrim for 2 weeks  Schedule for excision of hydradenitis of the right axilla  Dialysis before or on the day of surgery depending on nephrology    Please call me if you have any questions (cell phone: [phone])     Signed By: Emmit Spatz, MD     February 24, 2021

## 2021-02-25 ENCOUNTER — OFFICE VISIT (OUTPATIENT)
Dept: FAMILY MEDICINE CLINIC | Age: 46
End: 2021-02-25
Payer: MEDICAID

## 2021-02-25 VITALS — DIASTOLIC BLOOD PRESSURE: 50 MMHG | SYSTOLIC BLOOD PRESSURE: 100 MMHG | HEART RATE: 70 BPM

## 2021-02-25 DIAGNOSIS — M25.562 CHRONIC PAIN OF BOTH KNEES: ICD-10-CM

## 2021-02-25 DIAGNOSIS — M25.561 CHRONIC PAIN OF BOTH KNEES: ICD-10-CM

## 2021-02-25 DIAGNOSIS — N18.6 ESRD (END STAGE RENAL DISEASE) ON DIALYSIS (HCC): Primary | ICD-10-CM

## 2021-02-25 DIAGNOSIS — Z99.2 ESRD (END STAGE RENAL DISEASE) ON DIALYSIS (HCC): Primary | ICD-10-CM

## 2021-02-25 DIAGNOSIS — G89.29 CHRONIC PAIN OF BOTH KNEES: ICD-10-CM

## 2021-02-25 PROCEDURE — G8427 DOCREV CUR MEDS BY ELIG CLIN: HCPCS | Performed by: INTERNAL MEDICINE

## 2021-02-25 PROCEDURE — G8510 SCR DEP NEG, NO PLAN REQD: HCPCS | Performed by: INTERNAL MEDICINE

## 2021-02-25 PROCEDURE — G8419 CALC BMI OUT NRM PARAM NOF/U: HCPCS | Performed by: INTERNAL MEDICINE

## 2021-02-25 PROCEDURE — G9231 DOC ESRD DIA TRANS PREG: HCPCS | Performed by: INTERNAL MEDICINE

## 2021-02-25 PROCEDURE — 99203 OFFICE O/P NEW LOW 30 MIN: CPT | Performed by: INTERNAL MEDICINE

## 2021-02-25 NOTE — PROGRESS NOTES
Problem: Knowledge Deficit  Goal: *Participate in the learning process  Outcome: Progressing Towards Goal  Reviewed POC. Subjective:   Nga Calvert is a 55 y.o. male who was seen for No chief complaint on file. HPI   Pain over both knees, where tendon inserts. Noted no change in behavior, no injury. No change in meds. Noted dialyses the same. Home Medications    Medication Sig Start Date End Date Taking? Authorizing Provider   trimethoprim-sulfamethoxazole (BACTRIM DS, SEPTRA DS) 160-800 mg per tablet Take 1 Tab by mouth two (2) times a day. 2/24/21   Saima Nagy MD   omeprazole (PRILOSEC) 40 mg capsule Take 40 mg by mouth two (2) times a day. Other, MD Damaris   sevelamer carbonate (RENVELA) 800 mg tab tab Take 800 mg by mouth three (3) times daily. Provider, Historical   NIFEdipine ER (PROCARDIA XL) 60 mg ER tablet Take 60 mg by mouth daily. Provider, Historical   carvediloL (COREG) 25 mg tablet Take 25 mg by mouth two (2) times daily (with meals). Provider, Historical   hydrALAZINE (APRESOLINE) 100 mg tablet Take 100 mg by mouth three (3) times daily. Provider, Historical   furosemide (LASIX) 40 mg tablet Take 40 mg by mouth daily. Provider, Historical      No Known Allergies  Social History     Tobacco Use    Smoking status: Former Smoker     Years: 15.00    Smokeless tobacco: Never Used    Tobacco comment: l   Substance Use Topics    Alcohol use: Yes     Alcohol/week: 1.0 - 2.0 standard drinks     Types: 1 - 2 Glasses of wine per week     Comment: social     Drug use: Never            Review of Systems   Constitutional: Negative. HENT: Negative. Negative for congestion. Respiratory: Negative. Cardiovascular: Negative. Gastrointestinal: Negative. Musculoskeletal: Positive for arthralgias and gait problem. Negative for joint swelling. Neurological: Negative for weakness. Psychiatric/Behavioral: Negative. Physical Exam patient's exam of both knees revealed a prominent tibial tuberosity. It is tender right at the insertion point of his patella tendon.   There is a mild amount of swelling but no warmth. He otherwise has full range of motion with limited gait abnormalities. Vasculature is normal as well as palpation of the rest of the bones and muscles. Noticed no rash or other dysfunction. Objective: There were no vitals taken for this visit. alert, cooperative, no distress   normal mood, behavior, speech, dress, motor activity, and thought processes, able to follow commands          Assessment & Plan:     1. ESRD (end stage renal disease) on dialysis [facility])  Currently this is stent stable he went to dialysis today. We talked about his blood pressure being on the low and which could be due to too much medication he will follow this up with his nephrologist.    2. Chronic pain of both knees  We talked about using daily Tylenol as needed. In addition better exercise and stretching of the legs. About lifestyle issues that may make his legs and pain worse. 712    Additional exam findings: We discussed the expected course, resolution and complications of the diagnosis(es) in detail. Medication risks, benefits, costs, interactions, and alternatives were discussed as indicated. I advised him to contact the office if his condition worsens, changes or fails to improve as anticipated. He expressed understanding with the diagnosis(es) and plan.

## 2021-03-18 ENCOUNTER — HOSPITAL ENCOUNTER (OUTPATIENT)
Dept: PREADMISSION TESTING | Age: 46
Discharge: HOME | End: 2021-03-18
Payer: MEDICARE

## 2021-03-18 LAB — SARS-COV-2, COV2: NORMAL

## 2021-03-18 PROCEDURE — U0003 INFECTIOUS AGENT DETECTION BY NUCLEIC ACID (DNA OR RNA); SEVERE ACUTE RESPIRATORY SYNDROME CORONAVIRUS 2 (SARS-COV-2) (CORONAVIRUS DISEASE [COVID-19]), AMPLIFIED PROBE TECHNIQUE, MAKING USE OF HIGH THROUGHPUT TECHNOLOGIES AS DESCRIBED BY CMS-2020-01-R: HCPCS

## 2021-03-19 LAB — SARS-COV-2, COV2NT: NOT DETECTED

## 2021-03-22 ENCOUNTER — ANESTHESIA EVENT (OUTPATIENT)
Dept: SURGERY | Age: 46
End: 2021-03-22
Payer: MEDICARE

## 2021-03-22 NOTE — ANESTHESIA PREPROCEDURE EVALUATION
Relevant Problems   CARDIOVASCULAR   (+) HTN (hypertension)      RENAL FAILURE   (+) ESRD (end stage renal disease) on dialysis (HCC)      HEMATOLOGY   (+) Anemia       Anesthetic History   No history of anesthetic complications            Review of Systems / Medical History  Patient summary reviewed, nursing notes reviewed and pertinent labs reviewed    Pulmonary  Within defined limits                 Neuro/Psych   Within defined limits           Cardiovascular    Hypertension: well controlled          CAD    Exercise tolerance: >4 METS     GI/Hepatic/Renal         Renal disease: ESRD and dialysis       Endo/Other        Obesity and anemia     Other Findings            Physical Exam    Airway  Mallampati: I  TM Distance: < 4 cm  Neck ROM: normal range of motion   Mouth opening: Normal     Cardiovascular    Rhythm: regular  Rate: normal         Dental    Dentition: Lower dentition intact and Upper dentition intact     Pulmonary  Breath sounds clear to auscultation               Abdominal  Abdominal exam normal       Other Findings            Anesthetic Plan    ASA: 3  Anesthesia type: regional, MAC and general - backup - supraclavicular block          Induction: Intravenous  Anesthetic plan and risks discussed with: Patient

## 2021-03-23 ENCOUNTER — HOSPITAL ENCOUNTER (OUTPATIENT)
Age: 46
Setting detail: OUTPATIENT SURGERY
Discharge: HOME | End: 2021-03-23
Attending: SURGERY | Admitting: SURGERY
Payer: MEDICARE

## 2021-03-23 ENCOUNTER — ANESTHESIA (OUTPATIENT)
Dept: SURGERY | Age: 46
End: 2021-03-23
Payer: MEDICARE

## 2021-03-23 VITALS
WEIGHT: 208 LBS | SYSTOLIC BLOOD PRESSURE: 148 MMHG | HEIGHT: 73 IN | BODY MASS INDEX: 27.57 KG/M2 | DIASTOLIC BLOOD PRESSURE: 90 MMHG | HEART RATE: 60 BPM | OXYGEN SATURATION: 94 % | RESPIRATION RATE: 15 BRPM | TEMPERATURE: 97 F

## 2021-03-23 DIAGNOSIS — L73.2 HYDRADENITIS: Primary | ICD-10-CM

## 2021-03-23 PROCEDURE — 74011000250 HC RX REV CODE- 250: Performed by: NURSE ANESTHETIST, CERTIFIED REGISTERED

## 2021-03-23 PROCEDURE — 76010000149 HC OR TIME 1 TO 1.5 HR: Performed by: SURGERY

## 2021-03-23 PROCEDURE — 76942 ECHO GUIDE FOR BIOPSY: CPT | Performed by: NURSE ANESTHETIST, CERTIFIED REGISTERED

## 2021-03-23 PROCEDURE — 77030013079 HC BLNKT BAIR HGGR 3M -A: Performed by: NURSE ANESTHETIST, CERTIFIED REGISTERED

## 2021-03-23 PROCEDURE — 76210000021 HC REC RM PH II 0.5 TO 1 HR: Performed by: SURGERY

## 2021-03-23 PROCEDURE — 77030008556 HC TBNG SMK EVAC COVD -A: Performed by: SURGERY

## 2021-03-23 PROCEDURE — 77030010509 HC AIRWY LMA MSK TELE -A: Performed by: NURSE ANESTHETIST, CERTIFIED REGISTERED

## 2021-03-23 PROCEDURE — 11450 EXC SKN HDRDNT AX SMPL/NTRM: CPT | Performed by: SURGERY

## 2021-03-23 PROCEDURE — 74011000258 HC RX REV CODE- 258: Performed by: SURGERY

## 2021-03-23 PROCEDURE — 2709999900 HC NON-CHARGEABLE SUPPLY: Performed by: SURGERY

## 2021-03-23 PROCEDURE — 74011000250 HC RX REV CODE- 250

## 2021-03-23 PROCEDURE — 88304 TISSUE EXAM BY PATHOLOGIST: CPT

## 2021-03-23 PROCEDURE — 76210000063 HC OR PH I REC FIRST 0.5 HR: Performed by: SURGERY

## 2021-03-23 PROCEDURE — 74011250636 HC RX REV CODE- 250/636

## 2021-03-23 PROCEDURE — 74011250636 HC RX REV CODE- 250/636: Performed by: SURGERY

## 2021-03-23 PROCEDURE — 74011250636 HC RX REV CODE- 250/636: Performed by: NURSE ANESTHETIST, CERTIFIED REGISTERED

## 2021-03-23 PROCEDURE — 77030002982 HC SUT POLYSRB J&J -A: Performed by: SURGERY

## 2021-03-23 PROCEDURE — A4565 SLINGS: HCPCS | Performed by: SURGERY

## 2021-03-23 PROCEDURE — 77030031139 HC SUT VCRL2 J&J -A: Performed by: SURGERY

## 2021-03-23 PROCEDURE — 64450 NJX AA&/STRD OTHER PN/BRANCH: CPT | Performed by: NURSE ANESTHETIST, CERTIFIED REGISTERED

## 2021-03-23 PROCEDURE — 76060000033 HC ANESTHESIA 1 TO 1.5 HR: Performed by: SURGERY

## 2021-03-23 RX ORDER — SODIUM CHLORIDE, SODIUM LACTATE, POTASSIUM CHLORIDE, CALCIUM CHLORIDE 600; 310; 30; 20 MG/100ML; MG/100ML; MG/100ML; MG/100ML
25 INJECTION, SOLUTION INTRAVENOUS CONTINUOUS
Status: DISCONTINUED | OUTPATIENT
Start: 2021-03-23 | End: 2021-03-23 | Stop reason: HOSPADM

## 2021-03-23 RX ORDER — SODIUM CHLORIDE 0.9 % (FLUSH) 0.9 %
5-40 SYRINGE (ML) INJECTION EVERY 8 HOURS
Status: DISCONTINUED | OUTPATIENT
Start: 2021-03-23 | End: 2021-03-23 | Stop reason: HOSPADM

## 2021-03-23 RX ORDER — ONDANSETRON 2 MG/ML
4 INJECTION INTRAMUSCULAR; INTRAVENOUS ONCE
Status: DISCONTINUED | OUTPATIENT
Start: 2021-03-23 | End: 2021-03-23 | Stop reason: HOSPADM

## 2021-03-23 RX ORDER — FLUMAZENIL 0.1 MG/ML
0.2 INJECTION INTRAVENOUS
Status: DISCONTINUED | OUTPATIENT
Start: 2021-03-23 | End: 2021-03-23 | Stop reason: HOSPADM

## 2021-03-23 RX ORDER — ONDANSETRON 2 MG/ML
INJECTION INTRAMUSCULAR; INTRAVENOUS AS NEEDED
Status: DISCONTINUED | OUTPATIENT
Start: 2021-03-23 | End: 2021-03-23 | Stop reason: HOSPADM

## 2021-03-23 RX ORDER — NALOXONE HYDROCHLORIDE 0.4 MG/ML
0.2 INJECTION, SOLUTION INTRAMUSCULAR; INTRAVENOUS; SUBCUTANEOUS AS NEEDED
Status: DISCONTINUED | OUTPATIENT
Start: 2021-03-23 | End: 2021-03-23 | Stop reason: HOSPADM

## 2021-03-23 RX ORDER — MIDAZOLAM HYDROCHLORIDE 1 MG/ML
INJECTION, SOLUTION INTRAMUSCULAR; INTRAVENOUS AS NEEDED
Status: DISCONTINUED | OUTPATIENT
Start: 2021-03-23 | End: 2021-03-23 | Stop reason: HOSPADM

## 2021-03-23 RX ORDER — BUPIVACAINE HYDROCHLORIDE 5 MG/ML
INJECTION, SOLUTION EPIDURAL; INTRACAUDAL
Status: SHIPPED | OUTPATIENT
Start: 2021-03-23 | End: 2021-03-23

## 2021-03-23 RX ORDER — FENTANYL CITRATE 50 UG/ML
INJECTION, SOLUTION INTRAMUSCULAR; INTRAVENOUS AS NEEDED
Status: DISCONTINUED | OUTPATIENT
Start: 2021-03-23 | End: 2021-03-23 | Stop reason: HOSPADM

## 2021-03-23 RX ORDER — DEXAMETHASONE SODIUM PHOSPHATE 4 MG/ML
INJECTION, SOLUTION INTRA-ARTICULAR; INTRALESIONAL; INTRAMUSCULAR; INTRAVENOUS; SOFT TISSUE
Status: SHIPPED | OUTPATIENT
Start: 2021-03-23 | End: 2021-03-23

## 2021-03-23 RX ORDER — HYDROCODONE BITARTRATE AND ACETAMINOPHEN 5; 325 MG/1; MG/1
1 TABLET ORAL AS NEEDED
Status: DISCONTINUED | OUTPATIENT
Start: 2021-03-23 | End: 2021-03-23 | Stop reason: HOSPADM

## 2021-03-23 RX ORDER — OXYCODONE AND ACETAMINOPHEN 5; 325 MG/1; MG/1
1 TABLET ORAL
Qty: 24 TAB | Refills: 0 | Status: SHIPPED | OUTPATIENT
Start: 2021-03-23 | End: 2021-03-26

## 2021-03-23 RX ORDER — SODIUM CHLORIDE 0.9 % (FLUSH) 0.9 %
5-40 SYRINGE (ML) INJECTION AS NEEDED
Status: DISCONTINUED | OUTPATIENT
Start: 2021-03-23 | End: 2021-03-23 | Stop reason: HOSPADM

## 2021-03-23 RX ORDER — FENTANYL CITRATE 50 UG/ML
25 INJECTION, SOLUTION INTRAMUSCULAR; INTRAVENOUS
Status: DISCONTINUED | OUTPATIENT
Start: 2021-03-23 | End: 2021-03-23 | Stop reason: HOSPADM

## 2021-03-23 RX ORDER — PROPOFOL 10 MG/ML
INJECTION, EMULSION INTRAVENOUS AS NEEDED
Status: DISCONTINUED | OUTPATIENT
Start: 2021-03-23 | End: 2021-03-23 | Stop reason: HOSPADM

## 2021-03-23 RX ADMIN — DEXAMETHASONE SODIUM PHOSPHATE 4 MG: 4 INJECTION, SOLUTION INTRAMUSCULAR; INTRAVENOUS at 10:38

## 2021-03-23 RX ADMIN — ONDANSETRON HYDROCHLORIDE 4 MG: 2 INJECTION, SOLUTION INTRAMUSCULAR; INTRAVENOUS at 10:50

## 2021-03-23 RX ADMIN — CEFAZOLIN SODIUM 2 G: 1 INJECTION, POWDER, FOR SOLUTION INTRAMUSCULAR; INTRAVENOUS at 10:29

## 2021-03-23 RX ADMIN — FENTANYL CITRATE 50 MCG: 50 INJECTION, SOLUTION INTRAMUSCULAR; INTRAVENOUS at 10:40

## 2021-03-23 RX ADMIN — PROPOFOL 60 MG: 10 INJECTION, EMULSION INTRAVENOUS at 10:35

## 2021-03-23 RX ADMIN — SODIUM CHLORIDE, POTASSIUM CHLORIDE, SODIUM LACTATE AND CALCIUM CHLORIDE 25 ML/HR: 600; 310; 30; 20 INJECTION, SOLUTION INTRAVENOUS at 10:16

## 2021-03-23 RX ADMIN — MIDAZOLAM 2 MG: 1 INJECTION INTRAMUSCULAR; INTRAVENOUS at 10:32

## 2021-03-23 RX ADMIN — BUPIVACAINE HYDROCHLORIDE 20 ML: 5 INJECTION, SOLUTION EPIDURAL; INTRACAUDAL at 10:38

## 2021-03-23 NOTE — BRIEF OP NOTE
Brief Postoperative Note    Patient: Prerna Renteria  YOB: 1975  MRN: [id number]    Date of Procedure: 3/23/2021     Pre-Op Diagnosis: Hidradenitis [L73.2]    Post-Op Diagnosis: Same as preoperative diagnosis. Procedure(s):  Complex excision of HIDRADENITIS OF THE RIGHT AXILLA    Surgeon(s):  Lia Figueroa MD    Surgical Assistant: Registered Nurse First Assistant: Shen GUAJARDO    Anesthesia: General     Estimated Blood Loss (mL): Minimal    Complications: None    Specimens:   ID Type Source Tests Collected by Time Destination   1 : Rt axilla Hidradenitis  Preservative Axilla  Lia Figueroa MD 3/23/2021 1104 Pathology        Implants: * No implants in log *    Drains: * No LDAs found *    Findings: Severe complex hidradenitis.      Electronically Signed by Kacey Reese MD on 3/23/2021 at 11:41 AM

## 2021-03-23 NOTE — PROGRESS NOTES
Date of Surgery Update:  José Miguel Mcneil was seen and examined. History and physical has been reviewed. The patient has been examined. There have been no significant clinical changes since the completion of the originally dated History and Physical. Will proceed with excision of hydradenitis of the right axilla.     Signed By: Anton Oshea MD     March 23, 2021 10:15 AM

## 2021-03-23 NOTE — ANESTHESIA PROCEDURE NOTES
Peripheral Block    Start time: 3/23/2021 10:35 AM  End time: 3/23/2021 10:40 AM  Performed by: Pop Wood CRNA  Authorized by: Pop Wood CRNA       Pre-procedure:    Indications: post-op pain management    Preanesthetic Checklist: patient identified, risks and benefits discussed, site marked, timeout performed, anesthesia consent given and patient being monitored    Timeout Time: 10:32          Block Type:   Block Type:  PECS II  Laterality:  Right and medial  Monitoring:  Standard ASA monitoring, responsive to questions, oxygen, continuous pulse ox, frequent vital sign checks and heart rate  Injection Technique:  Single shot  Procedures: ultrasound guided    Patient Position: supine  Prep: chlorhexidine    Location:  Supraclavicular (Right Chest/Medial )  Needle Type:  Ultraplex  Needle Gauge:  20 G  Needle Localization:  Anatomical landmarks and ultrasound guidance  Medication Injected:  Bupivacaine (PF) (MARCAINE) 0.5% injection, 20 mL  dexamethasone (DECADRON) 4 mg/mL injection, 4 mg  Med Admin Time: 3/23/2021 10:38 AM    Assessment:  Number of attempts:  1  Injection Assessment:  Incremental injection every 5 mL, local visualized surrounding nerve on ultrasound, negative aspiration for blood, no paresthesia, no intravascular symptoms, low pressure verified by pressure monitor, ultrasound image on chart and negative aspiration for CSF  Patient tolerance:  Patient tolerated the procedure well with no immediate complications

## 2021-03-23 NOTE — ANESTHESIA POSTPROCEDURE EVALUATION
Procedure(s):  HIDRADENITIS OF THE RIGHT AXILLA.     regional, general    Anesthesia Post Evaluation      Multimodal analgesia: multimodal analgesia used between 6 hours prior to anesthesia start to PACU discharge  Patient location during evaluation: PACU  Patient participation: complete - patient participated  Level of consciousness: awake and alert  Pain score: 0  Pain management: satisfactory to patient  Airway patency: patent  Anesthetic complications: no  Cardiovascular status: acceptable and hemodynamically stable  Respiratory status: acceptable and spontaneous ventilation  Hydration status: acceptable  Post anesthesia nausea and vomiting:  none  Final Post Anesthesia Temperature Assessment:  Normothermia (36.0-37.5 degrees C)      INITIAL Post-op Vital signs:   Vitals Value Taken Time   /84 03/23/21 1139   Temp 36.8 °C (98.3 °F) 03/23/21 1139   Pulse 79 03/23/21 1139   Resp 15 03/23/21 1139   SpO2

## 2021-03-23 NOTE — DISCHARGE INSTRUCTIONS
Discharge Instructions Following Surgery    Patient: Lev Clayton MRN: [id number]  SSN: [id number]    YOB: 1975  Age: 55 y.o. Sex: male      Activity  · As tolerated, walking encourage, stairs are okay. · Avoid strenuous activities - no lifting anything heavier than 15 pounds till seen in the clinic. · You may shower at home after 2 days. Diet  · Regular diet after nausea from the anesthetic has passed. Pain  · Take pain medication as directed by your doctor. · Call your doctor if pain is NOT relieved by medication. Wound and Dressing Care  · There is 2 layers of gauzes. The first outer layer you can take in couple days before or after your first shower. The inner second  you can keep for few more days till it fall down. Also you may need to place a new gauze to collect any drainage since the wound is only partially closed. After Anesthesia  · For the first 24 hours: DO NOT Drive, Drink alcoholic beverages, or Make important decisions. · Be aware of dizziness following anesthesia and while taking pain medication. Call your doctor if  · Excessive bleeding that does not stop after holding mild pressure over the area. · Temperature of 101 degrees F or above. · Redness,excessive swelling or bruising, and/or green or yellow, smelly discharge from incision. · If nausea and vomiting continues. Appointment date/time Follow-Up Phone Calls    · Call the office at [phone] to make your follow-up appointment in 2 weeks after the surgery (if not already set up) . Dr. Seema Dickson cell phone number is [phone]. Please call me if you have any concerns or questions.

## 2021-03-23 NOTE — OP NOTES
[facility]  OPERATIVE REPORT    Name:  Franny Garcia  MR#:  [id number]  :  1975  ACCOUNT #:  [de-identified]  DATE OF SERVICE:  2021    PREOPERATIVE DIAGNOSIS:  Complex hidradenitis suppurativa of the right axillary area. POSTOPERATIVE DIAGNOSIS:  Complex hidradenitis suppurativa of the right axillary area. PROCEDURE PERFORMED:  Complex excision of the large hidradenitis suppurativa of the right axilla with a size of 15 cm x 6 cm wide that is all the way to the muscle. SURGEON:  Klaudia Strickland MD    ASSISTANT:  Graciela    ANESTHESIA:  General.    COMPLICATIONS:  None. SPECIMENS REMOVED:  Hidradenitis of the right axilla. IMPLANTS:  None. ESTIMATED BLOOD LOSS:  Minimum. DETAILS OF PROCEDURE:  The patient was brought to the operating room. Anesthesia was induced. A block was performed by the Anesthesia team and then the patient was positioned in supine position with the arm abducted perpendicular angle and scrubbing and draping of the right axilla and chest was done in the usual manner. A time-out was performed. The hidradenitis was very large and complex and long, so a large elliptical skin excision was performed to include the hidradenitis and it went through the skin, subcutaneous tissue, fat all the way to we saw a part of the muscle at some point. This was excised completely and sent for permanent pathology. Hemostasis was secured with electrocautery. I made sure that there is very good hemostasis because the patient is on dialysis. At the end, there was no evidence of any oozing or bleeding. So at this point, the area was irrigated multiple times and because there was severe chronic hidradenitis, I decided not to close completely. So, I closed only the subcutaneous tissue and subdermis with interrupted 2-0 Vicryl suture, started on the edges because it was large in the middle and then we brought the skin incision completely to each other.   At this point, I placed a 4 x 4 gauze followed by two Tegaderm and then fluffy gauze on top of the Tegaderm for compression, and the patient was transferred to the recovery room.     MD MAYELA NarayanY/PARADISE_MDRUA_T/PARADISE_[email or web address]  D:  03/23/2021 11:48  T:  03/23/2021 13:33  JOB #:  [id number]

## 2021-03-27 ENCOUNTER — HOSPITAL ENCOUNTER (EMERGENCY)
Age: 46
Discharge: HOME | End: 2021-03-27
Attending: EMERGENCY MEDICINE
Payer: MEDICARE

## 2021-03-27 VITALS
HEART RATE: 82 BPM | SYSTOLIC BLOOD PRESSURE: 139 MMHG | BODY MASS INDEX: 27.57 KG/M2 | WEIGHT: 208 LBS | DIASTOLIC BLOOD PRESSURE: 80 MMHG | RESPIRATION RATE: 18 BRPM | TEMPERATURE: 97.6 F | HEIGHT: 73 IN

## 2021-03-27 DIAGNOSIS — Z48.89 ENCOUNTER FOR POSTOPERATIVE WOUND CHECK: Primary | ICD-10-CM

## 2021-03-27 PROCEDURE — 99283 EMERGENCY DEPT VISIT LOW MDM: CPT

## 2021-03-27 NOTE — ED PROVIDER NOTES
EMERGENCY DEPARTMENT HISTORY AND PHYSICAL EXAM      Date: 3/27/2021  Patient Name: Delfino Holcomb    History of Presenting Illness     Chief Complaint   Patient presents with    Incisional Pain       History Provided By: Patient    HPI: Delfion Holcomb, 55 y.o. male presents to ED, from dialysis complaining of drainage from his surgical wound. Patient is postop day #5 excision of complex right hydroadenitis suppurativa by Charlena Canavan. He is going to dialysis and was to have his dressing changes but the nurse noted increased drainage and referred patient to ED. Patient denies any pain. He states the drainage is clear but is concerned about the amount. He denies any fever. States that the dialysis nurse replaced the front and dressing of the wound. He completed his dialysis and is due back Tuesday. There are no other complaints, changes, or physical findings at this time. PCP: Mervin Selby MD    No current facility-administered medications on file prior to encounter. Current Outpatient Medications on File Prior to Encounter   Medication Sig Dispense Refill    [] oxyCODONE-acetaminophen (PERCOCET) 5-325 mg per tablet Take 1 Tab by mouth every four (4) hours as needed for Pain for up to 3 days. Max Daily Amount: 6 Tabs. 24 Tab 0    trimethoprim-sulfamethoxazole (BACTRIM DS, SEPTRA DS) 160-800 mg per tablet Take 1 Tab by mouth two (2) times a day. 30 Tab 0    omeprazole (PRILOSEC) 40 mg capsule Take 40 mg by mouth two (2) times a day.  sevelamer carbonate (RENVELA) 800 mg tab tab Take 800 mg by mouth three (3) times daily.  NIFEdipine ER (PROCARDIA XL) 60 mg ER tablet Take 60 mg by mouth daily.  carvediloL (COREG) 25 mg tablet Take 25 mg by mouth two (2) times daily (with meals).  hydrALAZINE (APRESOLINE) 100 mg tablet Take 100 mg by mouth three (3) times daily.  furosemide (LASIX) 40 mg tablet Take 40 mg by mouth daily.          Past History     Past Medical History:  Past Medical History:   Diagnosis Date    CAD (coronary artery disease)     MI (12 years ago)    Chronic kidney disease     ESRD x4 years HD Treatment    Hypertension        Past Surgical History:  Past Surgical History:   Procedure Laterality Date    HX ORTHOPAEDIC      left carpal tunnel     HX ROTATOR CUFF REPAIR         Family History:  History reviewed. No pertinent family history. Social History:  Social History     Tobacco Use    Smoking status: Former Smoker     Years: 15.00    Smokeless tobacco: Never Used    Tobacco comment: l   Substance Use Topics    Alcohol use: Yes     Alcohol/week: 1.0 - 2.0 standard drinks     Types: 1 - 2 Glasses of wine per week     Comment: social     Drug use: Never       Allergies:  No Known Allergies      Review of Systems     Review of Systems   All other systems reviewed and are negative. Physical Exam     Physical Exam  Vitals signs and nursing note reviewed. Constitutional:       General: He is not in acute distress. Appearance: He is well-developed. He is not diaphoretic. HENT:      Head: Normocephalic and atraumatic. No right periorbital erythema or left periorbital erythema. Jaw: No trismus. Right Ear: External ear normal. No drainage or swelling. Tympanic membrane is not perforated, erythematous or bulging. Left Ear: External ear normal. No drainage or swelling. Tympanic membrane is not perforated, erythematous or bulging. Nose: Nose normal. No mucosal edema or rhinorrhea. Right Sinus: No maxillary sinus tenderness or frontal sinus tenderness. Left Sinus: No maxillary sinus tenderness or frontal sinus tenderness. Mouth/Throat:      Mouth: No oral lesions. Dentition: No dental abscesses. Pharynx: Uvula midline. No oropharyngeal exudate, posterior oropharyngeal erythema or uvula swelling. Tonsils: No tonsillar abscesses. Eyes:      General: No scleral icterus. Right eye: No discharge. Left eye: No discharge. Conjunctiva/sclera: Conjunctivae normal.   Neck:      Musculoskeletal: Normal range of motion and neck supple. Cardiovascular:      Rate and Rhythm: Normal rate and regular rhythm. Heart sounds: Normal heart sounds. No murmur. No friction rub. No gallop. Pulmonary:      Effort: Pulmonary effort is normal. No tachypnea, accessory muscle usage or respiratory distress. Breath sounds: Normal breath sounds. No decreased breath sounds, wheezing, rhonchi or rales. Abdominal:      General: Bowel sounds are normal. There is no distension. Palpations: Abdomen is soft. Tenderness: There is no abdominal tenderness. Musculoskeletal: Normal range of motion. General: No tenderness. Lymphadenopathy:      Cervical: No cervical adenopathy. Skin:     General: Skin is warm and dry. Comments: The surgical wound is located right axilla. There is 4 x 4 dressing anterior aspect of the wound which is soaked with clear discharge. The wound itself appears healing well. The drainage seem to be coming from the mid aspect of the wound. There is no erythema or signs of infection. The posterior end of the wound has dark blood 4 x 4 dressing with surrounding clear fluid. This was removed and again clear fluid noted to be oozing from about the mid aspect of the wound. The wounds were cleaned with normal saline and dressing with ABDs and 4 x 4 used by RN. Advised patient to call Dr. Clarice Sexton for any further instructions. Also advised patient to return to ED for fever or worsening pain. Neurological:      Mental Status: He is alert and oriented to person, place, and time. Psychiatric:         Judgment: Judgment normal.         Lab and Diagnostic Study Results     Labs -   No results found for this or any previous visit (from the past 12 hour(s)).     Radiologic Studies -   @lastxrresult@  CT Results  (Last 48 hours)    None        CXR Results  (Last 48 hours)    None Medical Decision Making   - I am the first provider for this patient. - I reviewed the vital signs, available nursing notes, past medical history, past surgical history, family history and social history. - Initial assessment performed. The patients presenting problems have been discussed, and they are in agreement with the care plan formulated and outlined with them. I have encouraged them to ask questions as they arise throughout their visit. Vital Signs-Reviewed the patient's vital signs. Patient Vitals for the past 12 hrs:   Temp Pulse Resp BP   03/27/21 1009 97.6 °F (36.4 °C) 82 18 139/80       Records Reviewed: Nursing Notes and Old Medical Records    The patient presents with       ED Course:          Provider Notes (Medical Decision Making): MDM       Procedures   Medical Decision Makingedical Decision Making  Performed by: Yelena Lima MD  PROCEDURES:  Procedures   Dressing changes by RN. Disposition   Disposition: Condition stable    Diagnosis:   1. Encounter for postoperative wound check          Disposition:     Follow-up Information     Follow up With Specialties Details Why Contact Info    Chidi Cabrera MD Surgery In 2 days  [address]  [facility] [address]  [phone]      [address] DEPT Emergency Medicine  As needed [facility] [address] [id number]  [phone]          Patient's Medications   Start Taking    No medications on file   Continue Taking    CARVEDILOL (COREG) 25 MG TABLET    Take 25 mg by mouth two (2) times daily (with meals). FUROSEMIDE (LASIX) 40 MG TABLET    Take 40 mg by mouth daily. HYDRALAZINE (APRESOLINE) 100 MG TABLET    Take 100 mg by mouth three (3) times daily. NIFEDIPINE ER (PROCARDIA XL) 60 MG ER TABLET    Take 60 mg by mouth daily. OMEPRAZOLE (PRILOSEC) 40 MG CAPSULE    Take 40 mg by mouth two (2) times a day.     SEVELAMER CARBONATE (RENVELA) 800 MG TAB TAB    Take 800 mg by mouth three (3) times daily. TRIMETHOPRIM-SULFAMETHOXAZOLE (BACTRIM DS, SEPTRA DS) 160-800 MG PER TABLET    Take 1 Tab by mouth two (2) times a day. These Medications have changed    No medications on file   Stop Taking    No medications on file       DISCHARGE PLAN:  1. Current Discharge Medication List      CONTINUE these medications which have NOT CHANGED    Details   trimethoprim-sulfamethoxazole (BACTRIM DS, SEPTRA DS) 160-800 mg per tablet Take 1 Tab by mouth two (2) times a day. Qty: 30 Tab, Refills: 0      omeprazole (PRILOSEC) 40 mg capsule Take 40 mg by mouth two (2) times a day. sevelamer carbonate (RENVELA) 800 mg tab tab Take 800 mg by mouth three (3) times daily. NIFEdipine ER (PROCARDIA XL) 60 mg ER tablet Take 60 mg by mouth daily. carvediloL (COREG) 25 mg tablet Take 25 mg by mouth two (2) times daily (with meals). hydrALAZINE (APRESOLINE) 100 mg tablet Take 100 mg by mouth three (3) times daily. furosemide (LASIX) 40 mg tablet Take 40 mg by mouth daily. STOP taking these medications       oxyCODONE-acetaminophen (PERCOCET) 5-325 mg per tablet Comments:   Reason for Stoppin.   Follow-up Information     Follow up With Specialties Details Why Contact Info    Aliza Eldridge MD Surgery In 2 days  [address]  [address] [place] [address]  [phone]      [address] DEPT Emergency Medicine  As needed [place] [id number] [phone]        3. Return to ED if worse   4. Current Discharge Medication List            Diagnosis     Clinical Impression:   1. Encounter for postoperative wound check        Attestations:    Riki Agudelo MD    Please note that this dictation was completed with Xenome, the [employer] voice recognition software. Quite often unanticipated grammatical, syntax, homophones, and other interpretive errors are inadvertently transcribed by the computer software. Please disregard these errors. Please excuse any errors that have escaped final proofreading. Thank you.

## 2021-03-27 NOTE — ED TRIAGE NOTES
States he had surgery Tuesday for hydranitis nurse at dialysis  Removed one dressing today and he states water poyred out here to have checked

## 2021-04-07 ENCOUNTER — OFFICE VISIT (OUTPATIENT)
Dept: SURGERY | Age: 46
End: 2021-04-07
Payer: MEDICARE

## 2021-04-07 DIAGNOSIS — Z09 POSTOPERATIVE EXAMINATION: Primary | ICD-10-CM

## 2021-04-07 PROCEDURE — 99024 POSTOP FOLLOW-UP VISIT: CPT | Performed by: SURGERY

## 2021-04-07 RX ORDER — CINACALCET 30 MG/1
TABLET, FILM COATED ORAL
COMMUNITY
Start: 2021-03-05 | End: 2021-08-10

## 2021-04-07 NOTE — PROGRESS NOTES
Dick Felix presents today for   Chief Complaint   Patient presents with    Post OP Follow Up       Is someone accompanying this pt? Patient is alone for appt. Is the patient using any DME equipment during OV? no    Depression Screening:  3 most recent PHQ Screens 4/7/2021   Little interest or pleasure in doing things Not at all   Feeling down, depressed, irritable, or hopeless Not at all   Total Score PHQ 2 0       Learning Assessment:  Learning Assessment 2/24/2021   PRIMARY LEARNER Patient   PRIMARY LANGUAGE ENGLISH   LEARNER PREFERENCE PRIMARY OTHER (COMMENT)   ANSWERED BY patient    RELATIONSHIP SELF       Health Maintenance reviewed and discussed and ordered per Provider. Health Maintenance Due   Topic Date Due    Hepatitis C Screening  Never done    Pneumococcal 0-64 years (1 of 3 - PCV13) Never done    COVID-19 Vaccine (1) Never done    DTaP/Tdap/Td series (1 - Tdap) Never done    Lipid Screen  Never done    Medicare Yearly Exam  Never done   . Coordination of Care:  1. Have you been to the ER, urgent care clinic since your last visit? Hospitalized since your last visit? Yes due to surgical site opening and draining     2. Have you seen or consulted any other health care providers outside of the [address] [place] [address] since your last visit? Include any pap smears or colon screening.  Dr. Mcguire Mt PCP

## 2021-04-07 NOTE — PROGRESS NOTES
Patient seen and examined. He is doing well. He denies any pain. He stated that the wound has partially open and is still draining. He denies any fever or chills. On exam the wound is healing well with good granulation tissue and the wound is partially open in the middle to about 1 to 2 cm. There is no foul-smelling drainage. There is no erythema or tenderness. I explained to the patient that I intentionally kept the wound partially open because it was infected and cannot close it tight. I explained to him that this was a very large hydradenitis and that is why it can take some time for it to heal.  I also explained for him the importance of moving his right upper extremity so he does not have a stiff shoulder because today I realize that he is able to move it but not extended all the way for full abduction so I explained to him that he needs to do this every day. I also explained the patient would like to see her in few weeks just for further evaluation and management and to continue with the wound care with wet-to-dry dressing every day or even dry dressing as needed.

## 2021-06-17 ENCOUNTER — OFFICE VISIT (OUTPATIENT)
Dept: FAMILY MEDICINE CLINIC | Age: 46
End: 2021-06-17
Payer: MEDICARE

## 2021-06-17 VITALS — OXYGEN SATURATION: 93 % | HEART RATE: 77 BPM | DIASTOLIC BLOOD PRESSURE: 84 MMHG | SYSTOLIC BLOOD PRESSURE: 138 MMHG

## 2021-06-17 DIAGNOSIS — L08.9 INFECTION OF SCALP: Primary | ICD-10-CM

## 2021-06-17 PROCEDURE — 99213 OFFICE O/P EST LOW 20 MIN: CPT | Performed by: FAMILY MEDICINE

## 2021-06-17 PROCEDURE — G9231 DOC ESRD DIA TRANS PREG: HCPCS | Performed by: FAMILY MEDICINE

## 2021-06-17 PROCEDURE — G8427 DOCREV CUR MEDS BY ELIG CLIN: HCPCS | Performed by: FAMILY MEDICINE

## 2021-06-17 PROCEDURE — G8510 SCR DEP NEG, NO PLAN REQD: HCPCS | Performed by: FAMILY MEDICINE

## 2021-06-17 PROCEDURE — G8419 CALC BMI OUT NRM PARAM NOF/U: HCPCS | Performed by: FAMILY MEDICINE

## 2021-06-17 RX ORDER — CHLORHEXIDINE GLUCONATE 20 %
5 SOLUTION, NON-ORAL MISCELLANEOUS
Qty: 1 BOTTLE | Refills: 0 | Status: ON HOLD | OUTPATIENT
Start: 2021-06-17 | End: 2021-08-10

## 2021-06-17 RX ORDER — CEPHALEXIN 500 MG/1
500 CAPSULE ORAL 4 TIMES DAILY
Qty: 40 CAPSULE | Refills: 0 | Status: SHIPPED | OUTPATIENT
Start: 2021-06-17 | End: 2021-06-27

## 2021-06-17 NOTE — PROGRESS NOTES
Osborn Bernheim presents today for   Chief Complaint   Patient presents with    Follow-up     has some sores in his head that are itching very badly        Is someone accompanying this pt? no    Is the patient using any DME equipment during OV? no    Depression Screening:  3 most recent PHQ Screens 6/17/2021   Little interest or pleasure in doing things Not at all   Feeling down, depressed, irritable, or hopeless Not at all   Total Score PHQ 2 0       Learning Assessment:  Learning Assessment 2/24/2021   PRIMARY LEARNER Patient   PRIMARY LANGUAGE ENGLISH   LEARNER PREFERENCE PRIMARY OTHER (COMMENT)   ANSWERED BY patient    RELATIONSHIP SELF       Fall Risk  Fall Risk Assessment, last 12 mths 2/25/2021   Able to walk? Yes   Fall in past 12 months? 0   Do you feel unsteady? 0   Are you worried about falling 0       ADL  ADL Assessment 2/25/2021   Feeding yourself No Help Needed   Getting from bed to chair No Help Needed   Getting dressed No Help Needed   Bathing or showering No Help Needed   Walk across the room (includes cane/walker) No Help Needed   Using the telphone No Help Needed   Taking your medications No Help Needed   Preparing meals No Help Needed   Managing money (expenses/bills) No Help Needed   Moderately strenuous housework (laundry) No Help Needed   Shopping for personal items (toiletries/medicines) No Help Needed   Shopping for groceries No Help Needed   Driving No Help Needed   Climbing a flight of stairs No Help Needed   Getting to places beyond walking distances No Help Needed       Travel Screening:    Travel Screening     Question   Response    In the last month, have you been in contact with someone who was confirmed or suspected to have Coronavirus / COVID-19? No / Unsure    Have you had a COVID-19 viral test in the last 14 days? No    Do you have any of the following new or worsening symptoms? None of these    Have you traveled internationally or domestically in the last month?   No Travel History   Travel since 05/17/21     No documented travel since 05/17/21          Health Maintenance reviewed and discussed and ordered per Provider. Health Maintenance Due   Topic Date Due    Hepatitis C Screening  Never done    Pneumococcal 0-64 years (1 of 4 - PCV13) Never done    COVID-19 Vaccine (1) Never done    DTaP/Tdap/Td series (1 - Tdap) Never done    Lipid Screen  Never done    Medicare Yearly Exam  Never done   . Coordination of Care:  1. Have you been to the ER, urgent care clinic since your last visit? Hospitalized since your last visit? no    2. Have you seen or consulted any other health care providers outside of the [address] [place] [address] since your last visit? Include any pap smears or colon screening.  no

## 2021-06-17 NOTE — PROGRESS NOTES
Subjective:   Reji Weston is a 55 y.o. male who was seen for Follow-up (has some sores in his head that are itching very badly )    HPI seen insisting he has a several week history of a scalp infection. No nausea vomiting or diarrhea. No cough or cold. No chest pain or shortness of breath. Has been eating relatively well he is on dialysis for renal disease. He has had no falls. He has some bleeding and irritation and since this is an infection. Done anything particularly to it all for it. No falls no rashes bowel movements appropriate    Home Medications    Medication Sig Start Date End Date Taking? Authorizing Provider   cephALEXin (KEFLEX) 500 mg capsule Take 1 Capsule by mouth four (4) times daily for 10 days. 6/17/21 6/27/21 Yes Miguel Walter MD   chlorhexidine gluconate, bulk, (HIBICLENS) 20 % soln liquid Apply 5 mL to affected area two (2) days a week. 6/17/21  Yes Miguel Walter MD   cinacalcet (SENSIPAR) 30 mg tablet TAKE 1 TABLET BY MOUTH ONCE DAILY WITH DINNER 3/5/21  Yes Provider, Historical   sevelamer carbonate (RENVELA) 800 mg tab tab Take 800 mg by mouth three (3) times daily. Yes Provider, Historical   NIFEdipine ER (PROCARDIA XL) 60 mg ER tablet Take 60 mg by mouth daily. Yes Provider, Historical   carvediloL (COREG) 25 mg tablet Take 25 mg by mouth two (2) times daily (with meals). Yes Provider, Historical   hydrALAZINE (APRESOLINE) 100 mg tablet Take 100 mg by mouth three (3) times daily. Yes Provider, Historical   furosemide (LASIX) 40 mg tablet Take 40 mg by mouth daily. Yes Provider, Historical   omeprazole (PRILOSEC) 40 mg capsule Take 40 mg by mouth two (2) times a day. Patient not taking: Reported on 6/17/2021    Other, MD Damaris      Allergies   Allergen Reactions    Bactrim [Sulfamethoxazole-Trimethoprim] Rash     Patient had a rash on his penis.       Social History     Tobacco Use    Smoking status: Former Smoker     Years: 15.00    Smokeless tobacco: Never Used    Tobacco comment: l   Vaping Use    Vaping Use: Never used   Substance Use Topics    Alcohol use: Not Currently     Alcohol/week: 1.0 - 2.0 standard drinks     Types: 1 - 2 Glasses of wine per week     Comment: social     Drug use: Never            Review of Systems   Constitutional: Negative. HENT: Negative. Eyes: Negative. Respiratory: Negative. Cardiovascular: Negative. Genitourinary: Negative. Skin: Positive for rash. Hematological: Negative. Psychiatric/Behavioral: Negative. Physical Exam   Objective:     Visit Vitals  /84   Pulse 77   SpO2 93%      General: alert, cooperative, no distress   Mental  status: normal mood, behavior, speech, dress, motor activity, and thought processes, able to follow commands   HENT: NCAT   Neck: no visualized mass   Resp: no respiratory distress   Neuro: no gross deficits   Skin: no discoloration or lesions of concern on visible areas   Psychiatric: normal affect, consistent with stated mood, no evidence of hallucinations   5 or 6 areas but they look more chronic to me than acute although several are irritated I am going to cover him with antibiotics with cephalexin and with a antibiotic shampoo we are going to reevaluate it in 2 weeks I may have dermatology see it I think this is not a new thing at all. The patient is insistent that I am wrong however    Assessment & Plan:     Skin infection: I am thinking this is not an infection it may be a chronic illness that he has scratched or irritated I am going to cover with antibiotic therapy we may need to get dermatology to see him I am going to look at it in 2 weeks and will make a decision at that point        936    Additional exam findings: We discussed the expected course, resolution and complications of the diagnosis(es) in detail. Medication risks, benefits, costs, interactions, and alternatives were discussed as indicated.   I advised him to contact the office if his condition worsens, changes or fails to improve as anticipated. He expressed understanding with the diagnosis(es) and plan.

## 2021-07-07 ENCOUNTER — OFFICE VISIT (OUTPATIENT)
Dept: FAMILY MEDICINE CLINIC | Age: 46
End: 2021-07-07
Payer: MEDICARE

## 2021-07-07 VITALS — SYSTOLIC BLOOD PRESSURE: 136 MMHG | HEART RATE: 75 BPM | DIASTOLIC BLOOD PRESSURE: 80 MMHG | OXYGEN SATURATION: 93 %

## 2021-07-07 DIAGNOSIS — D23.4 BENIGN NEOPLASM OF SCALP AND SKIN OF NECK: ICD-10-CM

## 2021-07-07 DIAGNOSIS — I10 ESSENTIAL HYPERTENSION: Primary | ICD-10-CM

## 2021-07-07 DIAGNOSIS — L08.9 INFECTION OF SCALP: ICD-10-CM

## 2021-07-07 PROCEDURE — G8419 CALC BMI OUT NRM PARAM NOF/U: HCPCS | Performed by: FAMILY MEDICINE

## 2021-07-07 PROCEDURE — G9231 DOC ESRD DIA TRANS PREG: HCPCS | Performed by: FAMILY MEDICINE

## 2021-07-07 PROCEDURE — 99213 OFFICE O/P EST LOW 20 MIN: CPT | Performed by: FAMILY MEDICINE

## 2021-07-07 PROCEDURE — G8510 SCR DEP NEG, NO PLAN REQD: HCPCS | Performed by: FAMILY MEDICINE

## 2021-07-07 PROCEDURE — G8427 DOCREV CUR MEDS BY ELIG CLIN: HCPCS | Performed by: FAMILY MEDICINE

## 2021-07-07 RX ORDER — NIFEDIPINE 60 MG/1
60 TABLET, EXTENDED RELEASE ORAL DAILY
Qty: 90 TABLET | Refills: 2 | Status: SHIPPED | OUTPATIENT
Start: 2021-07-07

## 2021-07-07 NOTE — PROGRESS NOTES
Subjective:   Stephanie Bob is a 55 y.o. male who was seen for Follow-up (2 week follow up )    HPI is seen for evaluation today. He is here for follow-up of his scalp he thinks he still has some problems there he has been using the shampoo he has had no fever or chills no cough or cold. His blood pressure has been doing better since we really started his medication no falls or injuries no rashes. Bowel movements have been appropriate. Sleeping well and eating well he is taking a multitude of medications for blood pressure. Home Medications    Medication Sig Start Date End Date Taking? Authorizing Provider   NIFEdipine ER (PROCARDIA XL) 60 mg ER tablet Take 1 Tablet by mouth daily. 7/7/21  Yes Susana Nixon MD   omeprazole (PRILOSEC) 40 mg capsule Take 40 mg by mouth two (2) times a day. Yes Other, MD Damaris   sevelamer carbonate (RENVELA) 800 mg tab tab Take 800 mg by mouth three (3) times daily. Yes Provider, Historical   carvediloL (COREG) 25 mg tablet Take 25 mg by mouth two (2) times daily (with meals). Yes Provider, Historical   hydrALAZINE (APRESOLINE) 100 mg tablet Take 100 mg by mouth three (3) times daily. Yes Provider, Historical   furosemide (LASIX) 40 mg tablet Take 40 mg by mouth daily. Yes Provider, Historical   chlorhexidine gluconate, bulk, (HIBICLENS) 20 % soln liquid Apply 5 mL to affected area two (2) days a week. Patient not taking: Reported on 7/7/2021 6/17/21   Susana Nixon MD   cinacalcet (SENSIPAR) 30 mg tablet TAKE 1 TABLET BY MOUTH ONCE DAILY WITH DINNER  Patient not taking: Reported on 7/7/2021 3/5/21   Provider, Historical      Allergies   Allergen Reactions    Bactrim [Sulfamethoxazole-Trimethoprim] Rash     Patient had a rash on his penis.       Social History     Tobacco Use    Smoking status: Former Smoker     Years: 15.00    Smokeless tobacco: Never Used    Tobacco comment: l   Vaping Use    Vaping Use: Never used   Substance Use Topics    Alcohol use: Not Currently     Alcohol/week: 1.0 - 2.0 standard drinks     Types: 1 - 2 Glasses of wine per week     Comment: social     Drug use: Never            Review of Systems   HENT: Negative. Respiratory: Negative. Gastrointestinal: Negative. Genitourinary: Negative. Musculoskeletal: Negative. Skin:        Scalp lesions   Hematological: Negative. Psychiatric/Behavioral: Negative. 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 He is in significant lack of distress    Assessment & Plan:     Hypertension, scalp lesions: The blood pressure is greatly improved. I am going to send him to dermatology these lesions are benign neoplasms I do not think there is anything to do to them but will let dermatology give us their answer. Follow-up on an as-needed basis. This was a 25-minute visit with face-to-face communication and our office. 717    Additional exam findings: We discussed the expected course, resolution and complications of the diagnosis(es) in detail. Medication risks, benefits, costs, interactions, and alternatives were discussed as indicated. I advised him to contact the office if his condition worsens, changes or fails to improve as anticipated.  He expressed understanding with the diagnosis(es) and plan.

## 2021-07-07 NOTE — PROGRESS NOTES
Oksana Parkinson presents today for   Chief Complaint   Patient presents with    Follow-up     2 week follow up        Is someone accompanying this pt? no    Is the patient using any DME equipment during [address]? no    Depression Screening:  3 most recent PHQ Screens 7/7/2021   Little interest or pleasure in doing things Not at all   Feeling down, depressed, irritable, or hopeless Not at all   Total Score PHQ 2 0       Learning Assessment:  Learning Assessment 2/24/2021   PRIMARY LEARNER Patient   PRIMARY LANGUAGE ENGLISH   LEARNER PREFERENCE PRIMARY OTHER (COMMENT)   ANSWERED BY patient    RELATIONSHIP SELF       Fall Risk  Fall Risk Assessment, last 12 mths 2/25/2021   Able to walk? Yes   Fall in past 12 months? 0   Do you feel unsteady? 0   Are you worried about falling 0       ADL  ADL Assessment 2/25/2021   Feeding yourself No Help Needed   Getting from bed to chair No Help Needed   Getting dressed No Help Needed   Bathing or showering No Help Needed   Walk across the room (includes cane/walker) No Help Needed   Using the telphone No Help Needed   Taking your medications No Help Needed   Preparing meals No Help Needed   Managing money (expenses/bills) No Help Needed   Moderately strenuous housework (laundry) No Help Needed   Shopping for personal items (toiletries/medicines) No Help Needed   Shopping for groceries No Help Needed   Driving No Help Needed   Climbing a flight of stairs No Help Needed   Getting to places beyond walking distances No Help Needed       Travel Screening:    Travel Screening     Question   Response    In the last month, have you been in contact with someone who was confirmed or suspected to have Coronavirus / COVID-19? No / Unsure    Have you had a COVID-19 viral test in the last 14 days? No    Do you have any of the following new or worsening symptoms? None of these    Have you traveled internationally or domestically in the last month?   No      Travel History   Travel since 06/07/21 No documented travel since 06/07/21          Health Maintenance reviewed and discussed and ordered per Provider. Health Maintenance Due   Topic Date Due    Hepatitis C Screening  Never done    Pneumococcal 0-64 years (1 of 4 - PCV13) Never done    COVID-19 Vaccine (1) Never done    DTaP/Tdap/Td series (1 - Tdap) Never done    Lipid Screen  Never done    Medicare Yearly Exam  Never done   . Coordination of Care:  1. Have you been to the ER, urgent care clinic since your last visit? Hospitalized since your last visit? no    2. Have you seen or consulted any other health care providers outside of the [address] [place] [address] since your last visit? Include any pap smears or colon screening.  no

## 2021-08-10 ENCOUNTER — APPOINTMENT (OUTPATIENT)
Dept: GENERAL RADIOLOGY | Age: 46
DRG: 291 | End: 2021-08-10
Attending: EMERGENCY MEDICINE
Payer: MEDICARE

## 2021-08-10 ENCOUNTER — HOSPITAL ENCOUNTER (INPATIENT)
Age: 46
LOS: 3 days | Discharge: HOME | DRG: 291 | End: 2021-08-13
Attending: EMERGENCY MEDICINE | Admitting: INTERNAL MEDICINE
Payer: MEDICARE

## 2021-08-10 DIAGNOSIS — I10 HYPERTENSION, UNSPECIFIED TYPE: ICD-10-CM

## 2021-08-10 DIAGNOSIS — E87.5 ACUTE HYPERKALEMIA: ICD-10-CM

## 2021-08-10 DIAGNOSIS — J81.0 ACUTE PULMONARY EDEMA (HCC): Primary | ICD-10-CM

## 2021-08-10 DIAGNOSIS — J98.01 ACUTE BRONCHOSPASM: ICD-10-CM

## 2021-08-10 PROBLEM — J81.1 PULMONARY EDEMA: Status: ACTIVE | Noted: 2021-08-10

## 2021-08-10 LAB
ANION GAP SERPL CALC-SCNC: 15 MMOL/L
ARTERIAL PATENCY WRIST A: ABNORMAL
ATRIAL RATE: 83 BPM
BASE EXCESS BLDA CALC-SCNC: 3.6 MMOL/L (ref 0–2.5)
BASOPHILS # BLD: 0 K/UL (ref 0–0.1)
BASOPHILS NFR BLD: 0 % (ref 0–2)
BDY SITE: ABNORMAL
BNP SERPL-MCNC: 2340 PG/ML (ref 0–100)
BUN SERPL-MCNC: 69 MG/DL (ref 9–21)
BUN/CREAT SERPL: 4
CA-I BLD-MCNC: 9.3 MG/DL (ref 8.5–10.5)
CALCULATED P AXIS, ECG09: 31 DEGREES
CALCULATED R AXIS, ECG10: -50 DEGREES
CALCULATED T AXIS, ECG11: 113 DEGREES
CHLORIDE SERPL-SCNC: 96 MMOL/L (ref 94–111)
CO2 SERPL-SCNC: 31 MMOL/L (ref 21–33)
COHGB MFR BLD: 2.8 % (ref 0–3)
CREAT SERPL-MCNC: 16 MG/DL (ref 0.8–1.5)
DIAGNOSIS, 93000: NORMAL
EOSINOPHIL # BLD: 0.1 K/UL (ref 0–0.4)
EOSINOPHIL NFR BLD: 1 % (ref 0–5)
ERYTHROCYTE [DISTWIDTH] IN BLOOD BY AUTOMATED COUNT: 18.2 % (ref 11.6–14.5)
FIO2 ON VENT: 44 %
GAS FLOW.O2 O2 DELIVERY SYS: 6 L/MIN
GLUCOSE SERPL-MCNC: 104 MG/DL (ref 70–110)
HCO3 BLDA-SCNC: 32 MMOL/L (ref 23–27)
HCT VFR BLD AUTO: 36.7 % (ref 36–48)
HGB BLD OXIMETRY-MCNC: 11.5 G/DL (ref 12–16)
HGB BLD-MCNC: 11 G/DL (ref 13–16)
IMM GRANULOCYTES # BLD AUTO: 0 K/UL
IMM GRANULOCYTES NFR BLD AUTO: 0 %
LACTATE SERPL-SCNC: 0.5 MMOL/L (ref 0.5–2)
LYMPHOCYTES # BLD: 1.4 K/UL (ref 0.9–3.6)
LYMPHOCYTES NFR BLD: 18 % (ref 21–52)
MCH RBC QN AUTO: 27.7 PG (ref 24–34)
MCHC RBC AUTO-ENTMCNC: 30 G/DL (ref 31–37)
MCV RBC AUTO: 92.4 FL (ref 74–97)
METHGB MFR BLD: 0.2 % (ref 0–3)
MONOCYTES # BLD: 0.5 K/UL (ref 0.05–1.2)
MONOCYTES NFR BLD: 6 % (ref 3–10)
NEUTS SEG # BLD: 6.2 K/UL (ref 1.8–8)
NEUTS SEG NFR BLD: 75 % (ref 40–73)
OXYHGB MFR BLD: 91.1 % (ref 90–100)
P-R INTERVAL, ECG05: 174 MS
PCO2 BLDA: 68 MMHG (ref 35–45)
PH BLDA: 7.29 [PH] (ref 7.37–7.43)
PLATELET # BLD AUTO: 198 K/UL (ref 135–420)
PMV BLD AUTO: 10.4 FL (ref 9.2–11.8)
PO2 BLDA: 85 MMHG (ref 84–98)
POTASSIUM SERPL-SCNC: 5.7 MMOL/L (ref 3.2–5.1)
Q-T INTERVAL, ECG07: 358 MS
QRS DURATION, ECG06: 94 MS
QTC CALCULATION (BEZET), ECG08: 418 MS
RBC # BLD AUTO: 3.97 M/UL (ref 4.7–5.5)
SAO2 % BLD: 94 % (ref 94–100)
SAO2% DEVICE SAO2% SENSOR NAME: ABNORMAL
SERVICE CMNT-IMP: ABNORMAL
SODIUM SERPL-SCNC: 142 MMOL/L (ref 135–145)
SPECIMEN SITE: ABNORMAL
TROPONIN I SERPL-MCNC: 0.1 NG/ML (ref 0.02–0.05)
VENTRICULAR RATE, ECG03: 82 BPM
WBC # BLD AUTO: 8.1 K/UL (ref 4.6–13.2)

## 2021-08-10 PROCEDURE — 94640 AIRWAY INHALATION TREATMENT: CPT

## 2021-08-10 PROCEDURE — 84484 ASSAY OF TROPONIN QUANT: CPT

## 2021-08-10 PROCEDURE — 74011250637 HC RX REV CODE- 250/637: Performed by: EMERGENCY MEDICINE

## 2021-08-10 PROCEDURE — 93005 ELECTROCARDIOGRAM TRACING: CPT

## 2021-08-10 PROCEDURE — 74011250636 HC RX REV CODE- 250/636: Performed by: EMERGENCY MEDICINE

## 2021-08-10 PROCEDURE — 82803 BLOOD GASES ANY COMBINATION: CPT

## 2021-08-10 PROCEDURE — 71045 X-RAY EXAM CHEST 1 VIEW: CPT

## 2021-08-10 PROCEDURE — 90935 HEMODIALYSIS ONE EVALUATION: CPT

## 2021-08-10 PROCEDURE — 77010033678 HC OXYGEN DAILY

## 2021-08-10 PROCEDURE — 65270000029 HC RM PRIVATE

## 2021-08-10 PROCEDURE — 74011000250 HC RX REV CODE- 250: Performed by: EMERGENCY MEDICINE

## 2021-08-10 PROCEDURE — 94761 N-INVAS EAR/PLS OXIMETRY MLT: CPT

## 2021-08-10 PROCEDURE — 83880 ASSAY OF NATRIURETIC PEPTIDE: CPT

## 2021-08-10 PROCEDURE — 74011250637 HC RX REV CODE- 250/637: Performed by: PHYSICIAN ASSISTANT

## 2021-08-10 PROCEDURE — 80048 BASIC METABOLIC PNL TOTAL CA: CPT

## 2021-08-10 PROCEDURE — 96375 TX/PRO/DX INJ NEW DRUG ADDON: CPT

## 2021-08-10 PROCEDURE — 96374 THER/PROPH/DIAG INJ IV PUSH: CPT

## 2021-08-10 PROCEDURE — 36600 WITHDRAWAL OF ARTERIAL BLOOD: CPT

## 2021-08-10 PROCEDURE — 65610000006 HC RM INTENSIVE CARE

## 2021-08-10 PROCEDURE — 5A1D70Z PERFORMANCE OF URINARY FILTRATION, INTERMITTENT, LESS THAN 6 HOURS PER DAY: ICD-10-PCS | Performed by: INTERNAL MEDICINE

## 2021-08-10 PROCEDURE — 97165 OT EVAL LOW COMPLEX 30 MIN: CPT

## 2021-08-10 PROCEDURE — 99285 EMERGENCY DEPT VISIT HI MDM: CPT

## 2021-08-10 PROCEDURE — 74011250636 HC RX REV CODE- 250/636: Performed by: PHYSICIAN ASSISTANT

## 2021-08-10 PROCEDURE — 85025 COMPLETE CBC W/AUTO DIFF WBC: CPT

## 2021-08-10 PROCEDURE — 36415 COLL VENOUS BLD VENIPUNCTURE: CPT

## 2021-08-10 PROCEDURE — 83605 ASSAY OF LACTIC ACID: CPT

## 2021-08-10 RX ORDER — NITROGLYCERIN 20 MG/100ML
20 INJECTION INTRAVENOUS CONTINUOUS
Status: DISCONTINUED | OUTPATIENT
Start: 2021-08-10 | End: 2021-08-10

## 2021-08-10 RX ORDER — ALBUTEROL SULFATE 0.83 MG/ML
5 SOLUTION RESPIRATORY (INHALATION)
Status: COMPLETED | OUTPATIENT
Start: 2021-08-10 | End: 2021-08-10

## 2021-08-10 RX ORDER — NIFEDIPINE 30 MG/1
60 TABLET, EXTENDED RELEASE ORAL DAILY
Status: DISCONTINUED | OUTPATIENT
Start: 2021-08-10 | End: 2021-08-13 | Stop reason: HOSPADM

## 2021-08-10 RX ORDER — SODIUM POLYSTYRENE SULFONATE 15 G/60ML
SUSPENSION ORAL; RECTAL
Status: DISPENSED
Start: 2021-08-10 | End: 2021-08-10

## 2021-08-10 RX ORDER — CALCIUM ACETATE 667 MG/1
1 CAPSULE ORAL
Status: DISCONTINUED | OUTPATIENT
Start: 2021-08-10 | End: 2021-08-13 | Stop reason: HOSPADM

## 2021-08-10 RX ORDER — FUROSEMIDE 10 MG/ML
40 INJECTION INTRAMUSCULAR; INTRAVENOUS ONCE
Status: COMPLETED | OUTPATIENT
Start: 2021-08-10 | End: 2021-08-10

## 2021-08-10 RX ORDER — ONDANSETRON 2 MG/ML
4 INJECTION INTRAMUSCULAR; INTRAVENOUS
Status: DISCONTINUED | OUTPATIENT
Start: 2021-08-10 | End: 2021-08-13 | Stop reason: HOSPADM

## 2021-08-10 RX ORDER — LABETALOL HCL 20 MG/4 ML
10 SYRINGE (ML) INTRAVENOUS ONCE
Status: ACTIVE | OUTPATIENT
Start: 2021-08-10 | End: 2021-08-10

## 2021-08-10 RX ORDER — ACETAMINOPHEN 650 MG/1
650 SUPPOSITORY RECTAL
Status: DISCONTINUED | OUTPATIENT
Start: 2021-08-10 | End: 2021-08-13 | Stop reason: HOSPADM

## 2021-08-10 RX ORDER — ACETAMINOPHEN 325 MG/1
650 TABLET ORAL
Status: DISCONTINUED | OUTPATIENT
Start: 2021-08-10 | End: 2021-08-13 | Stop reason: HOSPADM

## 2021-08-10 RX ORDER — FUROSEMIDE 10 MG/ML
40 INJECTION INTRAMUSCULAR; INTRAVENOUS DAILY
Status: DISCONTINUED | OUTPATIENT
Start: 2021-08-10 | End: 2021-08-13 | Stop reason: HOSPADM

## 2021-08-10 RX ORDER — HYDRALAZINE HYDROCHLORIDE 25 MG/1
100 TABLET, FILM COATED ORAL 3 TIMES DAILY
Status: DISCONTINUED | OUTPATIENT
Start: 2021-08-10 | End: 2021-08-13 | Stop reason: HOSPADM

## 2021-08-10 RX ORDER — CARVEDILOL 12.5 MG/1
25 TABLET ORAL 2 TIMES DAILY WITH MEALS
Status: DISCONTINUED | OUTPATIENT
Start: 2021-08-10 | End: 2021-08-13 | Stop reason: HOSPADM

## 2021-08-10 RX ORDER — CALCIUM ACETATE 667 MG/1
CAPSULE ORAL AS DIRECTED
COMMUNITY
Start: 2021-07-08 | End: 2022-02-02

## 2021-08-10 RX ORDER — IPRATROPIUM BROMIDE AND ALBUTEROL SULFATE 2.5; .5 MG/3ML; MG/3ML
3 SOLUTION RESPIRATORY (INHALATION)
Status: COMPLETED | OUTPATIENT
Start: 2021-08-10 | End: 2021-08-10

## 2021-08-10 RX ORDER — HEPARIN SODIUM 5000 [USP'U]/ML
5000 INJECTION, SOLUTION INTRAVENOUS; SUBCUTANEOUS EVERY 8 HOURS
Status: DISCONTINUED | OUTPATIENT
Start: 2021-08-10 | End: 2021-08-13 | Stop reason: HOSPADM

## 2021-08-10 RX ORDER — PROMETHAZINE HYDROCHLORIDE 25 MG/1
12.5 TABLET ORAL
Status: DISCONTINUED | OUTPATIENT
Start: 2021-08-10 | End: 2021-08-13 | Stop reason: HOSPADM

## 2021-08-10 RX ORDER — NITROGLYCERIN 20 MG/100ML
20 INJECTION INTRAVENOUS
Status: DISCONTINUED | OUTPATIENT
Start: 2021-08-10 | End: 2021-08-11

## 2021-08-10 RX ORDER — POLYETHYLENE GLYCOL 3350 17 G/17G
17 POWDER, FOR SOLUTION ORAL DAILY PRN
Status: DISCONTINUED | OUTPATIENT
Start: 2021-08-10 | End: 2021-08-13 | Stop reason: HOSPADM

## 2021-08-10 RX ORDER — SODIUM POLYSTYRENE SULFONATE 15 G/60ML
30 SUSPENSION ORAL; RECTAL
Status: COMPLETED | OUTPATIENT
Start: 2021-08-10 | End: 2021-08-10

## 2021-08-10 RX ORDER — SODIUM CHLORIDE 0.9 % (FLUSH) 0.9 %
5-40 SYRINGE (ML) INJECTION AS NEEDED
Status: DISCONTINUED | OUTPATIENT
Start: 2021-08-10 | End: 2021-08-13 | Stop reason: HOSPADM

## 2021-08-10 RX ORDER — SODIUM CHLORIDE 0.9 % (FLUSH) 0.9 %
5-40 SYRINGE (ML) INJECTION EVERY 8 HOURS
Status: DISCONTINUED | OUTPATIENT
Start: 2021-08-10 | End: 2021-08-13 | Stop reason: HOSPADM

## 2021-08-10 RX ORDER — PANTOPRAZOLE SODIUM 40 MG/1
40 TABLET, DELAYED RELEASE ORAL 2 TIMES DAILY
Status: DISCONTINUED | OUTPATIENT
Start: 2021-08-10 | End: 2021-08-13 | Stop reason: HOSPADM

## 2021-08-10 RX ADMIN — HYDRALAZINE HYDROCHLORIDE 100 MG: 25 TABLET, FILM COATED ORAL at 22:09

## 2021-08-10 RX ADMIN — HYDRALAZINE HYDROCHLORIDE 100 MG: 25 TABLET, FILM COATED ORAL at 08:03

## 2021-08-10 RX ADMIN — IPRATROPIUM BROMIDE AND ALBUTEROL SULFATE 3 ML: .5; 3 SOLUTION RESPIRATORY (INHALATION) at 02:27

## 2021-08-10 RX ADMIN — CALCIUM ACETATE 667 MG: 667 CAPSULE ORAL at 12:25

## 2021-08-10 RX ADMIN — NIFEDIPINE 60 MG: 30 TABLET, EXTENDED RELEASE ORAL at 08:03

## 2021-08-10 RX ADMIN — CALCIUM ACETATE 667 MG: 667 CAPSULE ORAL at 07:39

## 2021-08-10 RX ADMIN — NITROGLYCERIN 1 INCH: 20 OINTMENT TOPICAL at 02:32

## 2021-08-10 RX ADMIN — FUROSEMIDE 40 MG: 10 INJECTION, SOLUTION INTRAMUSCULAR; INTRAVENOUS at 09:25

## 2021-08-10 RX ADMIN — ALBUTEROL SULFATE 5 MG: 2.5 SOLUTION RESPIRATORY (INHALATION) at 03:52

## 2021-08-10 RX ADMIN — PANTOPRAZOLE SODIUM 40 MG: 40 TABLET, DELAYED RELEASE ORAL at 08:03

## 2021-08-10 RX ADMIN — HEPARIN SODIUM 5000 UNITS: 5000 INJECTION INTRAVENOUS; SUBCUTANEOUS at 07:40

## 2021-08-10 RX ADMIN — FUROSEMIDE 40 MG: 10 INJECTION, SOLUTION INTRAMUSCULAR; INTRAVENOUS at 02:33

## 2021-08-10 RX ADMIN — METHYLPREDNISOLONE SODIUM SUCCINATE 125 MG: 125 INJECTION, POWDER, FOR SOLUTION INTRAMUSCULAR; INTRAVENOUS at 02:50

## 2021-08-10 RX ADMIN — ACETAMINOPHEN 650 MG: 325 TABLET ORAL at 23:47

## 2021-08-10 RX ADMIN — NITROGLYCERIN 20 MCG/MIN: 20 INJECTION INTRAVENOUS at 03:13

## 2021-08-10 RX ADMIN — CARVEDILOL 25 MG: 12.5 TABLET, FILM COATED ORAL at 07:38

## 2021-08-10 RX ADMIN — HEPARIN SODIUM 5000 UNITS: 5000 INJECTION INTRAVENOUS; SUBCUTANEOUS at 15:14

## 2021-08-10 RX ADMIN — ALBUTEROL SULFATE 5 MG: 2.5 SOLUTION RESPIRATORY (INHALATION) at 02:47

## 2021-08-10 RX ADMIN — SODIUM POLYSTYRENE SULFONATE 30 G: 15 SUSPENSION ORAL; RECTAL at 04:32

## 2021-08-10 RX ADMIN — HEPARIN SODIUM 5000 UNITS: 5000 INJECTION INTRAVENOUS; SUBCUTANEOUS at 22:08

## 2021-08-10 RX ADMIN — Medication 10 ML: at 07:43

## 2021-08-10 RX ADMIN — Medication 10 ML: at 22:09

## 2021-08-10 NOTE — PROGRESS NOTES
conducted an initial consultation and Spiritual Assessment for Nirav Butcher, who is a 55 y.o.,male. Patients Primary Language is: [place]. According to the patients EMR Holiness Affiliation is: [facility].     The reason the Patient came to the hospital is:   Patient Active Problem List    Diagnosis Date Noted    Diarrhea 12/29/2020    Hyperkalemia 08/10/2021    Bronchospasm 08/10/2021    Pulmonary edema 08/10/2021    Hypertension 08/10/2021    Benign neoplasm of scalp and skin of neck 07/07/2021    Infection of scalp 06/17/2021    ESRD (end stage renal disease) on dialysis ([facility] [address] 12/29/2020    Anemia 12/29/2020    HTN (hypertension) 12/29/2020    Acute hyperkalemia 12/29/2020        The  provided the following Interventions:  Initiated a relationship of care and support. Explored issues of eliud, spirituality and/or Confucianist needs while hospitalized. Listened empathically. Provided chaplaincy education. Provided information about Spiritual Care Services. Offered prayer and assurance of continued prayers on patient's behalf. Chart reviewed. The following outcomes were achieved:  Patient shared some information about their medical narrative and spiritual journey/beliefs. Patient processed feeling about current hospitalization. Patient expressed gratitude for the 's visit. Assessment:  Patient did not indicate any spiritual or Confucianist issues which require Spiritual Care Services interventions at this time. Patient does not have any Confucianist/cultural needs that will affect patients preferences in health care. Plan:  Chaplains will continue to follow and will provide pastoral care on an as needed or requested basis.  recommends bedside caregivers page  on duty if patient shows signs of acute spiritual or emotional distress. Per patient, feeling better but still have some concerns about his future health.   provided a prayer of comfort and offered availability.      [address] [facility]   Staff [address] [facility]   [phone]

## 2021-08-10 NOTE — PROGRESS NOTES
TRANSFER - IN REPORT:    Verbal report received from Shannon Duarte RN(name) on Stephen Nicks  being received from ED(unit) for routine progression of care      Report consisted of patients Situation, Background, Assessment and   Recommendations(SBAR). Information from the following report(s) SBAR, Kardex, ED Summary, Intake/Output, MAR, Recent Results, Med Rec Status and Cardiac Rhythm NSR. was reviewed with the receiving nurse. Opportunity for questions and clarification was provided. Assessment completed upon patients arrival to unit and care assumed. Pt aaox3. Skin w/d. Sat's 95% on 02 @ 4 LPM.  Pt ambulated to bathroom to void and back to bed without difficulty. HR 77. NSR. /101. Nitro gtt @ 20mcg/min. Pt states he has a slight h/a.  AV Fistula in ALEJANDRA +Thrill + Bruit. CBWR.

## 2021-08-10 NOTE — PROGRESS NOTES
Pt ambulated to bathroom and had a bowel movement and states he voided a little. Pt instructed to void in urinal so we can measure his urine output.

## 2021-08-10 NOTE — PROGRESS NOTES
Reason for Admission:    Chart reviewed and adm noted for CC of SOB. Pt is a dialysis pt who had a change in his schedule which caused him to be 1 day late, according to report. He presented with oxygen sats of 85% which improved while in route to the hospital.  He refused Bipap or Cpap. Nephrology was contacted who was to arrange for HD today. PMH:  CAD, MI, CKD, ESRD x 4 hears on HD, HTN.  PSH:  Left carpal tunnel, Rotator cuff repair. DX:  Pulmonary Edema, Anemia, HLD,HTN, ESRD. RUR Score:       18%              Plan for utilizing home health:      TBD    PCP: First and Last name:  Bradley Jackman MD     Name of Practice: [facility]   Are you a current patient: Yes/No:    Approximate date of last visit: 07/07/21   Can you participate in a virtual visit with your PCP: Y                    Current Advanced Directive/Advance Care Plan: Full Code      Healthcare Decision Maker:   Click here to complete [address] including selection of the Healthcare Decision Maker Relationship (ie \"Primary\") Father, Brain Frazier,([phone] home & cell same)  Lydia Alvarez, ([phone] Home and cell same). Transition of Care Plan:  Follow pt's progress, anticipate discharge needs with MDT, and identify need for post-acute care and follow-up.

## 2021-08-10 NOTE — PROGRESS NOTES
Orders received for P.T., pt states he is at [facility] and does not require P.T. at this time. Will d/c order.   Shankar Good, PT, DPT

## 2021-08-10 NOTE — H&P
History and Physical    Subjective:     Zakiya Leon is a 55 y.o. male  has a past medical history of CAD (coronary artery disease), Chronic kidney disease, and Hypertension. Patient initially reported to emergency department with worsening shortness of breath prior to arrival and concerned about fluid overload. He does have CKD end-stage renal disease on hemodialysis and had a change in his schedule so he would be 1 day overdue. Because he was profoundly short of breath he called EMS on scene reporting 85% oxygen saturation which improved in route on oxygen. He refused CPAP or BiPAP initially. Oxygen by cannula was continued    Evaluated in the ICU while continuing on the nitroglycerin drip only now complaining of a headache and breathing better after diuresis in the ED. Prior to arriving on her service nephrology was consulted who asked for Kayexalate and indicated that he would arrange for dialysis today. Patient should be admitted with expected length of stay of 2 midnight. Past Medical History:   Diagnosis Date    CAD (coronary artery disease)     MI (12 years ago)    Chronic kidney disease     ESRD x4 years HD Treatment    Hypertension       Past Surgical History:   Procedure Laterality Date    HX ORTHOPAEDIC      left carpal tunnel     HX ROTATOR CUFF REPAIR       History reviewed. No pertinent family history. Social History     Tobacco Use    Smoking status: Former Smoker     Years: 15.00    Smokeless tobacco: Never Used    Tobacco comment: l   Substance Use Topics    Alcohol use: Not Currently     Alcohol/week: 1.0 - 2.0 standard drinks     Types: 1 - 2 Glasses of wine per week     Comment: social        Prior to Admission medications    Medication Sig Start Date End Date Taking? Authorizing Provider   calcium acetate,phosphat bind, (PHOSLO) 667 mg cap as directed. 7/8/21  Yes Other, MD Damaris   NIFEdipine ER (PROCARDIA XL) 60 mg ER tablet Take 1 Tablet by mouth daily. 7/7/21  Yes Itzel Mclean MD   chlorhexidine gluconate, bulk, (HIBICLENS) 20 % soln liquid Apply 5 mL to affected area two (2) days a week. 6/17/21  Yes Itzel Mclean MD   omeprazole (PRILOSEC) 40 mg capsule Take 40 mg by mouth two (2) times a day. Yes Other, MD Damaris   carvediloL (COREG) 25 mg tablet Take 25 mg by mouth two (2) times daily (with meals). Yes Provider, Historical   hydrALAZINE (APRESOLINE) 100 mg tablet Take 100 mg by mouth three (3) times daily. Yes Provider, Historical   furosemide (LASIX) 40 mg tablet Take 40 mg by mouth daily. Yes Provider, Historical     Allergies   Allergen Reactions    Bactrim [Sulfamethoxazole-Trimethoprim] Rash     Patient had a rash on his penis.            REVIEW OF SYSTEMS:       Total of 12 systems reviewed as follows:       POSITIVE= underlined text  Negative = text not underlined  General:  fever, chills, sweats, generalized weakness, weight loss/gain,      loss of appetite   Eyes:    blurred vision, eye pain, loss of vision, double vision  ENT:    rhinorrhea, pharyngitis   Respiratory:  cough, sputum production, SOB, JON, wheezing, pleuritic pain   Cardiology:   chest pain, palpitations, orthopnea, PND, edema, syncope   Gastrointestinal:  abdominal pain , N/V, diarrhea, dysphagia, constipation, bleeding   Genitourinary:  frequency, urgency, dysuria, hematuria, incontinence   Muskuloskeletal :  arthralgia, myalgia, back pain  Hematology: easy bruising, nose or gum bleeding, lymphadenopathy   Dermatological: rash, ulceration, pruritis, color change / jaundice  Endocrine:   hot flashes or polydipsia   Neurological:  headache, dizziness, confusion, focal weakness, paresthesia,     Speech difficulties, memory loss, gait difficulty  Psychological: Feelings of anxiety, depression, agitation      Objective:   VITALS:    Visit Vitals  BP (!) 178/101   Pulse 77   Temp 97.9 °F (36.6 °C)   Resp 24   Ht 6' 1\" (1.854 m)   Wt 95.7 kg (211 lb)   SpO2 95%   BMI 27.84 kg/m²       PHYSICAL EXAM:    General:    Alert, cooperative, no distress, appears stated age. HEENT: Atraumatic, anicteric sclerae, pink conjunctivae     No oral ulcers, mucosa moist, throat clear, dentition fair  Neck:  Supple, symmetrical,  thyroid: non tender  Lungs:   Clear to auscultation bilaterally. POS Wheezing or Rhonchi. No rales. Chest wall:  No tenderness  No Accessory muscle use. Heart:   Regular  rhythm,  No  murmur   No edema  Abdomen:   Soft, non-tender. Not distended. Bowel sounds normal  Extremities: No cyanosis. No clubbing,      Skin turgor normal, Capillary refill normal, Radial dial pulse 2+  Skin:     Not pale. Not Jaundiced  No rashes   Psych:  Good insight. Not depressed. Not anxious or agitated. Neurologic: EOMs intact. No facial asymmetry. No aphasia or slurred speech. Symmetrical strength,   Sensation grossly intact. Alert and oriented X 4. Given the patient's current clinical presentation, I have a high level of concern for decompensation if discharged from the emergency department. Complex decision making was performed, which includes reviewing the patient's available past medical records, laboratory results, and x-ray films.        _______________________________________________________________________  Care Plan discussed with:    Comments   Patient X    Family      RN X    Care Manager                    Consultant:      _______________________________________________________________________  Expected  Disposition:   Home with Family X   HH/PT/OT/RN    SNF/LTC    JOSÉ MANUEL    ________________________________________________________________________  TOTAL TIME:  48 Minutes    Critical Care Provided     Minutes non procedure based      Comments    X Reviewed previous records   >50% of visit spent in counseling and coordination of care X Discussion with patient and/or family and questions answered ________________________________________________________________________    Labs:  Recent Results (from the past 24 hour(s))   CBC WITH AUTOMATED DIFF    Collection Time: 08/10/21  2:25 AM   Result Value Ref Range    WBC 8.1 4.6 - 13.2 K/uL    RBC 3.97 (L) 4.70 - 5.50 M/uL    HGB 11.0 (L) 13.0 - 16.0 g/dL    HCT 36.7 36.0 - 48.0 %    MCV 92.4 74.0 - 97.0 FL    MCH 27.7 24.0 - 34.0 PG    MCHC 30.0 (L) 31.0 - 37.0 g/dL    RDW 18.2 (H) 11.6 - 14.5 %    PLATELET 037 618 - 261 K/uL    MPV 10.4 9.2 - 11.8 FL    NEUTROPHILS 75 (H) 40 - 73 %    LYMPHOCYTES 18 (L) 21 - 52 %    MONOCYTES 6 3 - 10 %    EOSINOPHILS 1 0 - 5 %    BASOPHILS 0 0 - 2 %    IMMATURE GRANULOCYTES 0 %    ABS. NEUTROPHILS 6.2 1.8 - 8.0 K/UL    ABS. LYMPHOCYTES 1.4 0.9 - 3.6 K/UL    ABS. MONOCYTES 0.5 0.05 - 1.2 K/UL    ABS. EOSINOPHILS 0.1 0.0 - 0.4 K/UL    ABS. BASOPHILS 0.0 0.0 - 0.1 K/UL    ABS. IMM.  GRANS. 0.0 K/UL   METABOLIC PANEL, BASIC    Collection Time: 08/10/21  2:25 AM   Result Value Ref Range    Sodium 142 135 - 145 mmol/L    Potassium 5.7 (H) 3.2 - 5.1 mmol/L    Chloride 96 94 - 111 mmol/L    CO2 31 21 - 33 mmol/L    Anion gap 15 mmol/L    Glucose 104 70 - 110 mg/dL    BUN 69 (H) 9 - 21 mg/dL    Creatinine 16.00 (H) 0.8 - 1.50 mg/dL    BUN/Creatinine ratio 4      GFR est AA 4 ml/min/1.73m2    GFR est non-AA 3 ml/min/1.73m2    Calcium 9.3 8.5 - 10.5 mg/dL   TROPONIN I    Collection Time: 08/10/21  2:25 AM   Result Value Ref Range    Troponin-I, Qt. 0.10 (H) 0.02 - 0.05 ng/mL   BNP    Collection Time: 08/10/21  2:25 AM   Result Value Ref Range    BNP 2,340 (H) 0 - 100 pg/mL   LACTIC ACID    Collection Time: 08/10/21  2:25 AM   Result Value Ref Range    Lactic acid 0.5 0.5 - 2.0 mmol/L   BLOOD GAS, ARTERIAL    Collection Time: 08/10/21  2:30 AM   Result Value Ref Range    pH 7.29 (L) 7.37 - 7.43      PCO2 68 (HH) 35.0 - 45.0 mmHg    PO2 85 84 - 98 mmHg    O2 SAT 94 94 - 100 %    BICARBONATE 32 (H) 23.0 - 27.0 mmol/L    BASE EXCESS 3.6 (H) 0.0 - 2.5 mmol/L    O2 METHOD Nasal Cannula      O2 FLOW RATE 6 L/min    FIO2 44.0 %    Sample source Arterial      SITE Right Radial      CALLUM'S TEST PASS      Critical value read back ER MD COHN     Carboxy-Hgb 2.8 0 - 3 %    Methemoglobin 0.2 0 - 3 %    tHb 11.5 (L) 12.0 - 16.0 g/dL    Oxyhemoglobin 91.1 90 - 100 %   EKG, 12 LEAD, INITIAL    Collection Time: 08/10/21  2:38 AM   Result Value Ref Range    Ventricular Rate 82 BPM    Atrial Rate 83 BPM    P-R Interval 174 ms    QRS Duration 94 ms    Q-T Interval 358 ms    QTC Calculation (Bezet) 418 ms    Calculated P Axis 31 degrees    Calculated R Axis -50 degrees    Calculated T Axis 113 degrees    Diagnosis       Sinus rhythm  Probable left atrial enlargement  Left anterior fascicular block  Abnormal R-wave progression, early transition  LVH with secondary repolarization abnormality  Baseline wander in lead(s) V5         Imaging:  No results found.      Assessment & Plan:       Pulmonary edema  -Lasix 40mg IV  -HD today  - Neph consult      Anemia  -likely secondary to ESRD  -Type and screen  -defer to neph for transfusion decision     Hyperkalemia  -Kaexalate given in ED  -Lasix   -HD today      Hypertension  -NTG drip  -Lasix   -home for BP  -slowly trending lower      ESRD (end stage renal disease) on dialysis ([facility] [address]  -HD today  -Neph consult           Code Status: Full    Prophylaxis: Lovenox 40 mg subcu daily     Electronically Signed : Lisseth Alarcon, PhD, PA-C, Hospital Medicine Service

## 2021-08-10 NOTE — PROGRESS NOTES
0730-Received care of pt. Pt resting in bed with eyes closed at this time. No distress noted. Vitals stable. Nitroglycerin drip infusing at 20mcg/min. Bed in lowest position, wheels locked, call bell within reach. 0830-Medication administered. Pt tolerated well. No distress noted. 1030-Pt resting in bed with eyes closed. No distress noted at this time. 1230-Pt sitting up in bed eating lunch tray at this time no distress noted    1430-Pt resting in bed on phone, no distress noted. 1630-dialysis nurse at bedside. Nitro drip paused for dialysis at this time. 1735-Dialysis completed. I's&O's completed at this time.

## 2021-08-10 NOTE — PROGRESS NOTES
Comprehensive Nutrition Assessment    Type and Reason for Visit: Initial    Nutrition Recommendations/Plan: modify to cardiac/renal diet for the 2G Na and 2G K+ restriction       Nutrition Assessment:  54 yo male PMH: CAD, HTN, CKD with HD   Pt attends HD but recenlty had schedule changed and 1 day overdue ended up hyperkalemia and fluid overload with SOB. Pt reports he drinks gatorade/powerade and does not measure fluids as he still urinates a lot. Educated pt on 32 oz FR plus any urine output for HD pts. Provided handout via nutrition care manual. Educated on pt current K+ and that sports drinks have high K+ and Na in them so he should probably leave them alone. Pt agrees says he can switch to crystal light    Recent Results (from the past 24 hour(s))   CBC WITH AUTOMATED DIFF    Collection Time: 08/10/21  2:25 AM   Result Value Ref Range    WBC 8.1 4.6 - 13.2 K/uL    RBC 3.97 (L) 4.70 - 5.50 M/uL    HGB 11.0 (L) 13.0 - 16.0 g/dL    HCT 36.7 36.0 - 48.0 %    MCV 92.4 74.0 - 97.0 FL    MCH 27.7 24.0 - 34.0 PG    MCHC 30.0 (L) 31.0 - 37.0 g/dL    RDW 18.2 (H) 11.6 - 14.5 %    PLATELET 237 352 - 642 K/uL    MPV 10.4 9.2 - 11.8 FL    NEUTROPHILS 75 (H) 40 - 73 %    LYMPHOCYTES 18 (L) 21 - 52 %    MONOCYTES 6 3 - 10 %    EOSINOPHILS 1 0 - 5 %    BASOPHILS 0 0 - 2 %    IMMATURE GRANULOCYTES 0 %    ABS. NEUTROPHILS 6.2 1.8 - 8.0 K/UL    ABS. LYMPHOCYTES 1.4 0.9 - 3.6 K/UL    ABS. MONOCYTES 0.5 0.05 - 1.2 K/UL    ABS. EOSINOPHILS 0.1 0.0 - 0.4 K/UL    ABS. BASOPHILS 0.0 0.0 - 0.1 K/UL    ABS. IMM.  GRANS. 0.0 K/UL   METABOLIC PANEL, BASIC    Collection Time: 08/10/21  2:25 AM   Result Value Ref Range    Sodium 142 135 - 145 mmol/L    Potassium 5.7 (H) 3.2 - 5.1 mmol/L    Chloride 96 94 - 111 mmol/L    CO2 31 21 - 33 mmol/L    Anion gap 15 mmol/L    Glucose 104 70 - 110 mg/dL    BUN 69 (H) 9 - 21 mg/dL    Creatinine 16.00 (H) 0.8 - 1.50 mg/dL    BUN/Creatinine ratio 4      GFR est AA 4 ml/min/1.73m2    GFR est non-AA 3 ml/min/1.73m2    Calcium 9.3 8.5 - 10.5 mg/dL   TROPONIN I    Collection Time: 08/10/21  2:25 AM   Result Value Ref Range    Troponin-I, Qt. 0.10 (H) 0.02 - 0.05 ng/mL   BNP    Collection Time: 08/10/21  2:25 AM   Result Value Ref Range    BNP 2,340 (H) 0 - 100 pg/mL   LACTIC ACID    Collection Time: 08/10/21  2:25 AM   Result Value Ref Range    Lactic acid 0.5 0.5 - 2.0 mmol/L   BLOOD GAS, ARTERIAL    Collection Time: 08/10/21  2:30 AM   Result Value Ref Range    pH 7.29 (L) 7.37 - 7.43      PCO2 68 (HH) 35.0 - 45.0 mmHg    PO2 85 84 - 98 mmHg    O2 SAT 94 94 - 100 %    BICARBONATE 32 (H) 23.0 - 27.0 mmol/L    BASE EXCESS 3.6 (H) 0.0 - 2.5 mmol/L    O2 METHOD Nasal Cannula      O2 FLOW RATE 6 L/min    FIO2 44.0 %    Sample source Arterial      SITE Right Radial      CALLUM'S TEST PASS      Critical value read back ER MD COHN     Carboxy-Hgb 2.8 0 - 3 %    Methemoglobin 0.2 0 - 3 %    tHb 11.5 (L) 12.0 - 16.0 g/dL    Oxyhemoglobin 91.1 90 - 100 %   EKG, 12 LEAD, INITIAL    Collection Time: 08/10/21  2:38 AM   Result Value Ref Range    Ventricular Rate 82 BPM    Atrial Rate 83 BPM    P-R Interval 174 ms    QRS Duration 94 ms    Q-T Interval 358 ms    QTC Calculation (Bezet) 418 ms    Calculated P Axis 31 degrees    Calculated R Axis -50 degrees    Calculated T Axis 113 degrees    Diagnosis       Sinus rhythm  Probable left atrial enlargement  Left anterior fascicular block  Abnormal R-wave progression, early transition  LVH with secondary repolarization abnormality  Baseline wander in lead(s) V5         Malnutrition Assessment:  Malnutrition Status:  No malnutrition    Context:  Acute illness     Findings of the 6 clinical characteristics of malnutrition:   Energy Intake:  No significant decrease in energy intake  Weight Loss:  No significant weight loss     Body Fat Loss:  No significant body fat loss,     Muscle Mass Loss:  No significant muscle mass loss,    Fluid Accumulation:  No significant fluid accumulation,     Strength:  Normal  strength         Estimated Daily Nutrient Needs:  Energy (kcal): 7223-2528 kcal/day; Weight Used for Energy Requirements: Admission (97 kg)  Protein (g): 77-97 g/day; Weight Used for Protein Requirements: Admission (0.8-1 g/kg)  Fluid (ml/day): 1000 mL plus urine output/day; Method Used for Fluid Requirements: Standard renal (1000 mL plus urine output)      Nutrition Related Findings:  Pt attends HD but recenlty had schedule changed and 1 day overdue ended up hyperkalemia and fluid overload with SOB. Pt reports he drinks gatorade/powerade and does not measure fluids as he still urinates a lot. Wounds:    None       Current Nutrition Therapies:  ADULT DIET Regular; Low Fat/Low Chol/High Fiber/2 gm Na; Low Sodium (2 gm); Low Phosphorus (Less than 1000 mg); 60 to 80 gm    Anthropometric Measures:  · Height:  6' 1\" (185.4 cm)  · Current Body Wt:  97.5 kg (215 lb)   · Admission Body Wt:  215 lb    · Usual Body Wt:        · Ideal Body Wt:  184 lbs:  116.8 %   · Adjusted Body Weight:   ; Weight Adjustment for: No adjustment   · Adjusted BMI:       · BMI Category: Overweight (BMI 25.0-29. 9)       Nutrition Diagnosis:   · Altered nutrition-related lab values related to renal dysfunction as evidenced by lab values      Nutrition Interventions:   Food and/or Nutrient Delivery: Continue current diet  Nutrition Education and Counseling: Education initiated, Education completed  Coordination of Nutrition Care: Continue to monitor while inpatient    Goals:  K+ 3.5-5.1, Pt to eat > 75% of meals, BM q 1-3 days, pt will start measuring fluid intake at home.        Nutrition Monitoring and Evaluation:   Behavioral-Environmental Outcomes: Readiness for change, Knowledge or skill  Food/Nutrient Intake Outcomes: Food and nutrient intake  Physical Signs/Symptoms Outcomes: Biochemical data, Meal time behavior, Weight     F/U: 8/13/2021    Discharge Planning:    Continue current diet Electronically signed by Maggy Lainez on 8/10/2021 at 3:58 PM    Contact: JULIETTE [phone]

## 2021-08-10 NOTE — PROGRESS NOTES
Bedside shift change report given to RENETTA Willson RN (oncoming nurse) by Raquel Jaramillo. Fallon Herrera RN (offgoing nurse). Report included the following information SBAR, Kardex, Intake/Output, MAR, Recent Results, Med Rec Status and Cardiac Rhythm NSR. Maria D Aleman

## 2021-08-10 NOTE — ED PROVIDER NOTES
Pt c/o sob, says gradual onset over last few hours, concerned for fluid overload. Severe just pta. H/o esrd, on hd t th sat, but went early on Friday last instead, so one day overdue. But denies any swelling. Denies chest pain. No fever. Mild non prod cough. No back or abd pain. No nausea. + smoker but no previous diagnosis. of asthma. Called 911, medics found pt in distress, sats 85%, improved on nv to upper 90's. They attempted cpap but pt refused, wouldn't tolerate. Says had had both covid vaccinations. Past Medical History:   Diagnosis Date    CAD (coronary artery disease)     MI (12 years ago)    Chronic kidney disease     ESRD x4 years HD Treatment    Hypertension        Past Surgical History:   Procedure Laterality Date    HX ORTHOPAEDIC      left carpal tunnel     HX ROTATOR CUFF REPAIR           No family history on file. Social History     Socioeconomic History    Marital status: SINGLE     Spouse name: Not on file    Number of children: Not on file    Years of education: Not on file    Highest education level: Not on file   Occupational History    Not on file   Tobacco Use    Smoking status: Former Smoker     Years: 15.00    Smokeless tobacco: Never Used    Tobacco comment: l   Vaping Use    Vaping Use: Never used   Substance and Sexual Activity    Alcohol use: Not Currently     Alcohol/week: 1.0 - 2.0 standard drinks     Types: 1 - 2 Glasses of wine per week     Comment: social     Drug use: Never    Sexual activity: Yes     Partners: Female     Birth control/protection: Condom   Other Topics Concern    Not on file   Social History Narrative    Not on file     Social Determinants of Health     Financial Resource Strain:     Difficulty of Paying Living Expenses:    Food Insecurity:     Worried About Running Out of Food in the Last Year:     Ran Out of Food in the Last Year:    Transportation Needs:     Lack of Transportation (Medical):      Lack of Transportation (Non-Medical):    Physical Activity:     Days of Exercise per Week:     Minutes of Exercise per Session:    Stress:     Feeling of Stress :    Social Connections:     Frequency of Communication with Friends and Family:     Frequency of Social Gatherings with Friends and Family:     Attends Restorationist Services:     Active Member of Clubs or Organizations:     Attends Club or Organization Meetings:     Marital Status:    Intimate Partner Violence:     Fear of Current or Ex-Partner:     Emotionally Abused:     Physically Abused:     Sexually Abused: ALLERGIES: Bactrim [sulfamethoxazole-trimethoprim]    Review of Systems   Constitutional: Negative for diaphoresis and fever. HENT: Negative for congestion. Respiratory: Positive for cough and shortness of breath. Cardiovascular: Negative for chest pain. Gastrointestinal: Negative for abdominal pain and nausea. Musculoskeletal: Negative for back pain. Skin: Negative for rash. Neurological: Negative for numbness. All other systems reviewed and are negative. Vitals:    08/10/21 0317 08/10/21 0320 08/10/21 0330 08/10/21 0340   BP: (!) 187/104 (!) 184/93 (!) 157/102 (!) 167/105   Pulse: 79 81 82 82   Resp: 22 (!) 32 24 24   Temp:       SpO2: 95% 95% 96% 94%   Weight:       Height:                Physical Exam  Vitals and nursing note reviewed. Constitutional:       General: He is in acute distress. Appearance: He is well-developed. HENT:      Head: Normocephalic and atraumatic. Nose: Nose normal.      Mouth/Throat:      Mouth: Mucous membranes are moist.   Eyes:      Conjunctiva/sclera: Conjunctivae normal.   Cardiovascular:      Rate and Rhythm: Normal rate and regular rhythm. Pulmonary:      Effort: Respiratory distress present. Breath sounds: Wheezing present. Comments: Tachypnic, dec aeration throughout  Abdominal:      Palpations: Abdomen is soft. Tenderness:  There is no abdominal tenderness. Musculoskeletal:         General: No tenderness. Cervical back: Normal range of motion. Skin:     General: Skin is warm and dry. Capillary Refill: Capillary refill takes less than 2 seconds. Findings: No rash. Neurological:      Mental Status: He is alert and oriented to person, place, and time.    Psychiatric:         Mood and Affect: Mood normal.          MDM       Procedures    Vitals:  Patient Vitals for the past 12 hrs:   Temp Pulse Resp BP SpO2   08/10/21 0340  82 24 (!) 167/105 94 %   08/10/21 0330  82 24 (!) 157/102 96 %   08/10/21 0320  81 (!) 32 (!) 184/93 95 %   08/10/21 0317  79 22 (!) 187/104 95 %   08/10/21 0313  80  (!) 187/104    08/10/21 0259  81 24 (!) 191/118 96 %   08/10/21 0250  81 23 (!) 189/109 97 %   08/10/21 0247     97 %   08/10/21 0239  87 30 (!) 191/115 97 %   08/10/21 0232  88  (!) 130/112    08/10/21 0227     98 %   08/10/21 0222     (!) 85 %   08/10/21 0221     96 %   08/10/21 0220 98.4 °F (36.9 °C) 95 30 (!) 130/112 (!) 85 %         Medications ordered:   Medications   labetaloL (NORMODYNE;TRANDATE) injection 10 mg (0 mg IntraVENous Held 8/10/21 0249)   nitroglycerin (Tridil) 200 mcg/ml infusion (20 mcg/min IntraVENous New Bag 8/10/21 0313)   sodium polystyrene (KAYEXALATE) 15 gram/60 mL oral suspension 30 g (has no administration in time range)   albuterol-ipratropium (DUO-NEB) 2.5 MG-0.5 MG/3 ML (3 mL Nebulization Given 8/10/21 0227)   nitroglycerin (NITROBID) 2 % ointment 1 Inch (1 Inch Topical Given 8/10/21 0232)   furosemide (LASIX) injection 40 mg (40 mg IntraVENous Given 8/10/21 0233)   methylPREDNISolone (PF) (Solu-MEDROL) injection 125 mg (125 mg IntraVENous Given 8/10/21 0250)   albuterol (PROVENTIL VENTOLIN) nebulizer solution 5 mg (5 mg Nebulization Given 8/10/21 0247)   albuterol (PROVENTIL VENTOLIN) nebulizer solution 5 mg (5 mg Nebulization Given 8/10/21 0352)         Lab findings:  Recent Results (from the past 12 hour(s))   CBC WITH AUTOMATED DIFF    Collection Time: 08/10/21  2:25 AM   Result Value Ref Range    WBC 8.1 4.6 - 13.2 K/uL    RBC 3.97 (L) 4.70 - 5.50 M/uL    HGB 11.0 (L) 13.0 - 16.0 g/dL    HCT 36.7 36.0 - 48.0 %    MCV 92.4 74.0 - 97.0 FL    MCH 27.7 24.0 - 34.0 PG    MCHC 30.0 (L) 31.0 - 37.0 g/dL    RDW 18.2 (H) 11.6 - 14.5 %    PLATELET 813 140 - 001 K/uL    MPV 10.4 9.2 - 11.8 FL    NEUTROPHILS 75 (H) 40 - 73 %    LYMPHOCYTES 18 (L) 21 - 52 %    MONOCYTES 6 3 - 10 %    EOSINOPHILS 1 0 - 5 %    BASOPHILS 0 0 - 2 %    IMMATURE GRANULOCYTES 0 %    ABS. NEUTROPHILS 6.2 1.8 - 8.0 K/UL    ABS. LYMPHOCYTES 1.4 0.9 - 3.6 K/UL    ABS. MONOCYTES 0.5 0.05 - 1.2 K/UL    ABS. EOSINOPHILS 0.1 0.0 - 0.4 K/UL    ABS. BASOPHILS 0.0 0.0 - 0.1 K/UL    ABS. IMM.  GRANS. 0.0 K/UL   METABOLIC PANEL, BASIC    Collection Time: 08/10/21  2:25 AM   Result Value Ref Range    Sodium 142 135 - 145 mmol/L    Potassium 5.7 (H) 3.2 - 5.1 mmol/L    Chloride 96 94 - 111 mmol/L    CO2 31 21 - 33 mmol/L    Anion gap 15 mmol/L    Glucose 104 70 - 110 mg/dL    BUN 69 (H) 9 - 21 mg/dL    Creatinine 16.00 (H) 0.8 - 1.50 mg/dL    BUN/Creatinine ratio 4      GFR est AA 4 ml/min/1.73m2    GFR est non-AA 3 ml/min/1.73m2    Calcium 9.3 8.5 - 10.5 mg/dL   TROPONIN I    Collection Time: 08/10/21  2:25 AM   Result Value Ref Range    Troponin-I, Qt. 0.10 (H) 0.02 - 0.05 ng/mL   BNP    Collection Time: 08/10/21  2:25 AM   Result Value Ref Range    BNP 2,340 (H) 0 - 100 pg/mL   LACTIC ACID    Collection Time: 08/10/21  2:25 AM   Result Value Ref Range    Lactic acid 0.5 0.5 - 2.0 mmol/L   BLOOD GAS, ARTERIAL    Collection Time: 08/10/21  2:30 AM   Result Value Ref Range    pH 7.29 (L) 7.37 - 7.43      PCO2 68 (HH) 35.0 - 45.0 mmHg    PO2 85 84 - 98 mmHg    O2 SAT 94 94 - 100 %    BICARBONATE 32 (H) 23.0 - 27.0 mmol/L    BASE EXCESS 3.6 (H) 0.0 - 2.5 mmol/L    O2 METHOD Nasal Cannula      O2 FLOW RATE 6 L/min    FIO2 44.0 %    Sample source Arterial SITE Right Radial      CALLUM'S TEST PASS      Critical value read back ER MD COHN     Carboxy-Hgb 2.8 0 - 3 %    Methemoglobin 0.2 0 - 3 %    tHb 11.5 (L) 12.0 - 16.0 g/dL    Oxyhemoglobin 91.1 90 - 100 %   EKG, 12 LEAD, INITIAL    Collection Time: 08/10/21  2:38 AM   Result Value Ref Range    Ventricular Rate 82 BPM    Atrial Rate 83 BPM    P-R Interval 174 ms    QRS Duration 94 ms    Q-T Interval 358 ms    QTC Calculation (Bezet) 418 ms    Calculated P Axis 31 degrees    Calculated R Axis -50 degrees    Calculated T Axis 113 degrees    Diagnosis       Sinus rhythm  Probable left atrial enlargement  Left anterior fascicular block  Abnormal R-wave progression, early transition  LVH with secondary repolarization abnormality  Baseline wander in lead(s) V5             X-Ray, CT or other radiology findings or impressions:  XR CHEST PORT    (Results Pending)       Progress notes, Consult notes or additional Procedure notes:   2:46 AM increased aeration, cont sig b/l wheezing, tachypnea   3:30 AM bp 157/102, improved on ntg drip. Pt feeling much better, now calm, denies sob currently. 3:34 AM D/w dr Pradeep Carr, nephrology, to consult, arrange hd this am. req 30 kayexelate, agrees w curr management otherwise. 3:52 AM d/w Radha Hansen, to admit to icu  CRITICAL CARE NOTE:  3:53 AM  I have spent 35 minutes of critical care time involved in lab review, consultations with specialist, family decision-making, and documentation. During this entire length of time I was immediately available to the patient. Critical Care: The reason for providing this level of medical care for this critically ill patient was due a critical illness that impaired one or more vital organ systems such that there was a high probability of imminent or life threatening deterioration in the patients condition.  This care involved high complexity decision making to assess, manipulate, and support vital system functions, to treat this degree vital organ system failure and to prevent further life threatening deterioration of the patients condition. Diagnosis:   1. Acute pulmonary edema (HCC)    2. Acute bronchospasm    3. Acute hyperkalemia    4. Hypertension, unspecified type        Disposition: admit    Follow-up Information    None          Patient's Medications   Start Taking    No medications on file   Continue Taking    CARVEDILOL (COREG) 25 MG TABLET    Take 25 mg by mouth two (2) times daily (with meals). CHLORHEXIDINE GLUCONATE, BULK, (HIBICLENS) 20 % SOLN LIQUID    Apply 5 mL to affected area two (2) days a week. CINACALCET (SENSIPAR) 30 MG TABLET    TAKE 1 TABLET BY MOUTH ONCE DAILY WITH DINNER    FUROSEMIDE (LASIX) 40 MG TABLET    Take 40 mg by mouth daily. HYDRALAZINE (APRESOLINE) 100 MG TABLET    Take 100 mg by mouth three (3) times daily. NIFEDIPINE ER (PROCARDIA XL) 60 MG ER TABLET    Take 1 Tablet by mouth daily. OMEPRAZOLE (PRILOSEC) 40 MG CAPSULE    Take 40 mg by mouth two (2) times a day. SEVELAMER CARBONATE (RENVELA) 800 MG TAB TAB    Take 800 mg by mouth three (3) times daily.    These Medications have changed    No medications on file   Stop Taking    No medications on file

## 2021-08-10 NOTE — ED NOTES
Patient states he normally has dialysis on tues/thurs/sat/ but this week the center changed his schedule due to staffing and he had dialysis on thur and Friday and is due to have dialysis today.

## 2021-08-10 NOTE — ED TRIAGE NOTES
Patient brought in by EMS for shortness of breath, Patient had low sat and was given 40 mg iv lasix by medic.
21.6

## 2021-08-10 NOTE — ED NOTES
TRANSFER - OUT REPORT:    Verbal report given to [facility]) on Mckenzie Urbano  being transferred to ICU(unit) for routine progression of care       Report consisted of patients Situation, Background, Assessment and   Recommendations(SBAR). Information from the following report(s) ED Summary was reviewed with the receiving nurse. Lines:   Peripheral IV 08/10/21 Right Forearm (Active)   Site Assessment Clean, dry, & intact 08/10/21 0225   Phlebitis Assessment 0 08/10/21 0225   Infiltration Assessment 0 08/10/21 0225   Dressing Status Clean, dry, & intact 08/10/21 0225   Dressing Type Transparent 08/10/21 0225   Hub Color/Line Status Green 08/10/21 0225       Peripheral IV 08/10/21 Right Antecubital (Active)   Site Assessment Clean, dry, & intact 08/10/21 0311   Phlebitis Assessment 0 08/10/21 0311   Infiltration Assessment 0 08/10/21 0311   Dressing Status Clean, dry, & intact 08/10/21 0311   Dressing Type Transparent 08/10/21 0311        Opportunity for questions and clarification was provided.       Patient transported with:   Monitor  O2 @ 6 liters  Tech

## 2021-08-10 NOTE — PROGRESS NOTES
Hemodialysis / Jaime Sharmila / [phone]    Vitals   Pre   Post   Assessment   Pre   Post     Temp  Temp: 97.8 °F (36.6 °C) (08/10/21 0759)  97.6   LOC  Alert and oriented *4 NO CHANGE   HR   Pulse (Heart Rate): 71 (08/10/21 1443) 68 Lungs   clear  NO CHANGE   B/P   BP: (!) 168/93 (08/10/21 1443) 165/89 Cardiac    regular monitored in the room NO CHANGE   Resp   Resp Rate: 18 (08/10/21 1443) 18 Skin   warm dry and intact  NO CHANGE   Pain level  Pain Intensity 1: 3 (08/10/21 0800) 0 Edema    none   NO CHANGE     Orders:    Duration:   Start:    1440 End:    1740 Total:   3   Dialyzer:   Dialyzer/Set Up Inspection: Laya Stokes (08/10/21 1443)   K Bath:   Dialysate K (mEq/L): 2 (08/10/21 1443)   Ca Bath:   Dialysate CA (mEq/L): 2.5 (08/10/21 1443)   Na/Bicarb:   Dialysate NA (mEq/L): 138 (08/10/21 1443)   Target Fluid Removal:   Goal/Amount of Fluid to Remove (mL): 3000 mL (08/10/21 1443)   Access     Type & Location:   ALEJANDRA AVF: skin CDI. No s/s of infection. + B/T. No issues with cannulation or hemostasis. Running well at .       Labs     Obtained/Reviewed   Critical Results Called   Date when labs were drawn-  Hgb-    HGB   Date Value Ref Range Status   08/10/2021 11.0 (L) 13.0 - 16.0 g/dL Final     K-    Potassium   Date Value Ref Range Status   08/10/2021 5.7 (H) 3.2 - 5.1 mmol/L Final     Comment:     SAMPLE IS NOT HEMOLYSED     Ca-   Calcium   Date Value Ref Range Status   08/10/2021 9.3 8.5 - 10.5 mg/dL Final     Bun-   BUN   Date Value Ref Range Status   08/10/2021 69 (H) 9 - 21 mg/dL Final     Creat-   Creatinine   Date Value Ref Range Status   08/10/2021 16.00 (H) 0.8 - 1.50 mg/dL Final        Medications/ Blood Products Given     Name   Dose   Route and Time                     Blood Volume Processed (BVP):    68.7 LITERS Net Fluid   Removed:  3000 ML   Comments   Time Out Done: ([address]  Primary Nurse Rpt Pre:Sarah Trejo  Primary Nurse Rpt Kai López Plan:continue hd  Tx Summary:    Pt arrived to HD [address]. Consent signed & on file. SBAR received from Primary RN. 1440: Pt cannulated with 91N needles per policy & without issue. Labs drawn per request/ order. VSS. Dialysis Tx initiated. 1740: Tx ended. VSS. All possible blood returned to patient. Hemostasis achieved without issue. Bed locked and in the lowest position, call bell and belongings in reach. SBAR given to Primary, RN. Patient is stable at time of their/ my departure. All Dialysis related medications have been reviewed. Admiting Diagnosis:  Pt's previous clinic-  Consent signed - Informed Consent Verified: Yes (08/10/21 1443)  [place] Consent -   Hepatitis Status- Negative/Immune as of 5/27/21  Machine #- Machine Number: [id number] [facility] (08/10/21 1443)  Telemetry status-  Pre-dialysis wt. -

## 2021-08-11 LAB
ANION GAP SERPL CALC-SCNC: 12 MMOL/L
BASOPHILS # BLD: 0 K/UL (ref 0–0.1)
BASOPHILS NFR BLD: 0 % (ref 0–2)
BUN SERPL-MCNC: 47 MG/DL (ref 9–21)
BUN/CREAT SERPL: 4
CA-I BLD-MCNC: 9.2 MG/DL (ref 8.5–10.5)
CHLORIDE SERPL-SCNC: 97 MMOL/L (ref 94–111)
CO2 SERPL-SCNC: 30 MMOL/L (ref 21–33)
CREAT SERPL-MCNC: 11.7 MG/DL (ref 0.8–1.5)
EOSINOPHIL # BLD: 0 K/UL (ref 0–0.4)
EOSINOPHIL NFR BLD: 0 % (ref 0–5)
ERYTHROCYTE [DISTWIDTH] IN BLOOD BY AUTOMATED COUNT: 17.9 % (ref 11.6–14.5)
GLUCOSE SERPL-MCNC: 89 MG/DL (ref 70–110)
HCT VFR BLD AUTO: 32.5 % (ref 36–48)
HGB BLD-MCNC: 9.8 G/DL (ref 13–16)
IMM GRANULOCYTES # BLD AUTO: 0 K/UL
IMM GRANULOCYTES NFR BLD AUTO: 0 %
LYMPHOCYTES # BLD: 0.9 K/UL (ref 0.9–3.6)
LYMPHOCYTES NFR BLD: 20 % (ref 21–52)
MCH RBC QN AUTO: 27.8 PG (ref 24–34)
MCHC RBC AUTO-ENTMCNC: 30.2 G/DL (ref 31–37)
MCV RBC AUTO: 92.1 FL (ref 74–97)
MONOCYTES # BLD: 0.4 K/UL (ref 0.05–1.2)
MONOCYTES NFR BLD: 8 % (ref 3–10)
NEUTS SEG # BLD: 3.3 K/UL (ref 1.8–8)
NEUTS SEG NFR BLD: 72 % (ref 40–73)
PLATELET # BLD AUTO: 149 K/UL (ref 135–420)
PMV BLD AUTO: 9.3 FL (ref 9.2–11.8)
POTASSIUM SERPL-SCNC: 4.2 MMOL/L (ref 3.2–5.1)
RBC # BLD AUTO: 3.53 M/UL (ref 4.7–5.5)
SODIUM SERPL-SCNC: 139 MMOL/L (ref 135–145)
WBC # BLD AUTO: 4.6 K/UL (ref 4.6–13.2)

## 2021-08-11 PROCEDURE — 65270000029 HC RM PRIVATE

## 2021-08-11 PROCEDURE — 85025 COMPLETE CBC W/AUTO DIFF WBC: CPT

## 2021-08-11 PROCEDURE — 77010033678 HC OXYGEN DAILY

## 2021-08-11 PROCEDURE — 94761 N-INVAS EAR/PLS OXIMETRY MLT: CPT

## 2021-08-11 PROCEDURE — 94618 PULMONARY STRESS TESTING: CPT

## 2021-08-11 PROCEDURE — 74011250637 HC RX REV CODE- 250/637: Performed by: PHYSICIAN ASSISTANT

## 2021-08-11 PROCEDURE — 80048 BASIC METABOLIC PNL TOTAL CA: CPT

## 2021-08-11 PROCEDURE — 65610000006 HC RM INTENSIVE CARE

## 2021-08-11 PROCEDURE — 36415 COLL VENOUS BLD VENIPUNCTURE: CPT

## 2021-08-11 PROCEDURE — 74011250636 HC RX REV CODE- 250/636: Performed by: PHYSICIAN ASSISTANT

## 2021-08-11 RX ADMIN — PANTOPRAZOLE SODIUM 40 MG: 40 TABLET, DELAYED RELEASE ORAL at 17:13

## 2021-08-11 RX ADMIN — HYDRALAZINE HYDROCHLORIDE 100 MG: 25 TABLET, FILM COATED ORAL at 21:33

## 2021-08-11 RX ADMIN — ACETAMINOPHEN 650 MG: 325 TABLET ORAL at 08:43

## 2021-08-11 RX ADMIN — PANTOPRAZOLE SODIUM 40 MG: 40 TABLET, DELAYED RELEASE ORAL at 08:44

## 2021-08-11 RX ADMIN — CALCIUM ACETATE 667 MG: 667 CAPSULE ORAL at 17:13

## 2021-08-11 RX ADMIN — HEPARIN SODIUM 5000 UNITS: 5000 INJECTION INTRAVENOUS; SUBCUTANEOUS at 06:06

## 2021-08-11 RX ADMIN — CARVEDILOL 25 MG: 12.5 TABLET, FILM COATED ORAL at 17:13

## 2021-08-11 RX ADMIN — CARVEDILOL 25 MG: 12.5 TABLET, FILM COATED ORAL at 08:45

## 2021-08-11 RX ADMIN — CALCIUM ACETATE 667 MG: 667 CAPSULE ORAL at 08:44

## 2021-08-11 RX ADMIN — FUROSEMIDE 40 MG: 10 INJECTION, SOLUTION INTRAMUSCULAR; INTRAVENOUS at 08:44

## 2021-08-11 RX ADMIN — HYDRALAZINE HYDROCHLORIDE 100 MG: 25 TABLET, FILM COATED ORAL at 16:10

## 2021-08-11 RX ADMIN — NIFEDIPINE 60 MG: 30 TABLET, EXTENDED RELEASE ORAL at 08:45

## 2021-08-11 RX ADMIN — HYDRALAZINE HYDROCHLORIDE 100 MG: 25 TABLET, FILM COATED ORAL at 08:45

## 2021-08-11 RX ADMIN — Medication 10 ML: at 06:33

## 2021-08-11 RX ADMIN — Medication 10 ML: at 21:33

## 2021-08-11 RX ADMIN — CALCIUM ACETATE 667 MG: 667 CAPSULE ORAL at 12:35

## 2021-08-11 RX ADMIN — Medication 10 ML: at 16:10

## 2021-08-11 RX ADMIN — HEPARIN SODIUM 5000 UNITS: 5000 INJECTION INTRAVENOUS; SUBCUTANEOUS at 14:43

## 2021-08-11 RX ADMIN — HEPARIN SODIUM 5000 UNITS: 5000 INJECTION INTRAVENOUS; SUBCUTANEOUS at 21:32

## 2021-08-11 NOTE — PROGRESS NOTES
Pt stable no distress noted.  Offered to set pt up for a bath and changed linen and he declined at this time

## 2021-08-11 NOTE — PROGRESS NOTES
Pt has improved dramaticallly since receiving HD, but prior to dc was noted to desaturate. This was while sleeping, and then also with ambulating, down to 88%. I suspect with a little more aggressive HD he will no longer need 02. Will cancel dc for today, and ask nepology to consider another run today, plan for dc tomorrow, hopefully w/o 02.

## 2021-08-11 NOTE — DISCHARGE SUMMARY
Discharge Summary       PATIENT ID: Krupa Prakash  MRN: [id number]   YOB: 1975    DATE OF ADMISSION: 8/10/2021  2:19 AM    DATE OF DISCHARGE: 08/11/21    PRIMARY CARE PROVIDER: Ina Rossi MD     ATTENDING PHYSICIAN: Edy Joseph MD  DISCHARGING PROVIDER: Edy Joseph MD        CONSULTATIONS: IP CONSULT TO NEPHROLOGY    PROCEDURES/SURGERIES: * No surgery found *    ADMITTING [address] [place] [address] COURSE:   Krupa Prakash is a 55 y.o. male  has a past medical history of CAD (coronary artery disease), Chronic kidney disease, and Hypertension.       Patient initially reported to emergency department with worsening shortness of breath prior to arrival and concerned about fluid overload. He does have CKD end-stage renal disease on hemodialysis and had a change in his schedule so he would be 1 day overdue.      Because he was profoundly short of breath he called EMS on scene reporting 85% oxygen saturation which improved in route on oxygen. He refused CPAP or BiPAP initially. Oxygen by cannula was continued     Evaluated in the ICU while continuing on the nitroglycerin drip only now complaining of a headache and breathing better after diuresis in the ED. Prior to arriving on her service nephrology was consulted who asked for Kayexalate and indicated that he would arrange for dialysis today.     Patient should be admitted with expected length of stay of 2 midnight.         DISCHARGE DIAGNOSES / PLAN:      Assessment & Plan:         Pulmonary edema  -Lasix 40mg IV  -HD today  - Neph consult        Anemia  -likely secondary to ESRD  -Type and screen  -defer to neph for transfusion decision      Hyperkalemia  -Kaexalate given in ED  -Lasix   -HD today        Hypertension  -NTG drip  -Lasix   -home for BP  -slowly trending lower        ESRD (end stage renal disease) on dialysis ([facility] [address]  -HD today  -Neph consult         Day of dc  This am feels great after hd  We will allow him to walk, if feels well, can dc home, resumes home hd tomorrow    FOLLOW UP APPOINTMENTS:    Follow-up Information     Follow up With Specialties Details Why Shayla Harrell MD [facility]   [address]  [phone]               DIET: renal      DISCHARGE MEDICATIONS:  Current Discharge Medication List      CONTINUE these medications which have NOT CHANGED    Details   calcium acetate,phosphat bind, (PHOSLO) 667 mg cap as directed. NIFEdipine ER (PROCARDIA XL) 60 mg ER tablet Take 1 Tablet by mouth daily. Qty: 90 Tablet, Refills: 2    Associated Diagnoses: Essential hypertension      omeprazole (PRILOSEC) 40 mg capsule Take 40 mg by mouth two (2) times a day. carvediloL (COREG) 25 mg tablet Take 25 mg by mouth two (2) times daily (with meals). hydrALAZINE (APRESOLINE) 100 mg tablet Take 100 mg by mouth three (3) times daily. furosemide (LASIX) 40 mg tablet Take 40 mg by mouth daily. NOTIFY YOUR PHYSICIAN FOR ANY OF THE FOLLOWING:   Fever over 101 degrees for 24 hours. Chest pain, shortness of breath, fever, chills, nausea, vomiting, diarrhea, change in mentation, falling, weakness, bleeding. Severe pain or pain not relieved by medications. Or, any other signs or symptoms that you may have questions about. PHYSICAL EXAMINATION AT DISCHARGE:  General:          Alert, cooperative, no distress, appears stated age. HEENT:           Atraumatic, anicteric sclerae, pink conjunctivae                          No oral ulcers, mucosa moist, throat clear, dentition fair  Neck:               Supple, symmetrical  Lungs:             Clear to auscultation bilaterally. No Wheezing or Rhonchi. No rales. Chest wall:      No tenderness  No Accessory muscle use. Heart:              + systolic murmur, no edema  Abdomen:        Soft, non-tender. Not distended. Bowel sounds normal  Extremities:     No cyanosis.   No clubbing,                            Skin turgor normal, Capillary refill normal  Skin:                Not pale. Not Jaundiced  No rashes   Psych:             Not anxious or agitated.   Neurologic:      Alert, moves all extremities, answers questions appropriately and responds to commands       CHRONIC MEDICAL DIAGNOSES:  Problem List as of 8/11/2021 Date Reviewed: 7/8/2021        Codes Class Noted - Resolved    Diarrhea ICD-10-CM: R19.7  ICD-9-CM: 787.91  12/29/2020 - Present        Hyperkalemia ICD-10-CM: E87.5  ICD-9-CM: 276.7  8/10/2021 - Present        Bronchospasm ICD-10-CM: J98.01  ICD-9-CM: 519.11  8/10/2021 - Present        * (Principal) Pulmonary edema ICD-10-CM: J81.1  ICD-9-CM: 415  8/10/2021 - Present        Hypertension ICD-10-CM: I10  ICD-9-CM: 401.9  8/10/2021 - Present        Benign neoplasm of scalp and skin of neck ICD-10-CM: D23.4  ICD-9-CM: 216.4  7/7/2021 - Present        Infection of scalp ICD-10-CM: L08.9  ICD-9-CM: 686.9  6/17/2021 - Present        ESRD (end stage renal disease) on dialysis [facility]) ICD-10-CM: N18.6, Z99.2  ICD-9-CM: 585.6, V45.11  12/29/2020 - Present        Anemia ICD-10-CM: D64.9  ICD-9-CM: 285.9  12/29/2020 - Present        HTN (hypertension) ICD-10-CM: I10  ICD-9-CM: 401.9  12/29/2020 - Present        Acute hyperkalemia ICD-10-CM: E87.5  ICD-9-CM: 276.7  12/29/2020 - Present              Greater than 35 minutes were spent with the patient on counseling and coordination of care    Signed:   Bobby Newton MD  8/11/2021  10:11 AM

## 2021-08-11 NOTE — PROGRESS NOTES
OCCUPATIONAL THERAPY EVALUATION/DISCHARGE    Patient: Hanna Merino (1 y.o. male)  Date: 8/11/2021  Primary Diagnosis: Pulmonary edema [J81.1]  Hypertension [I10]  Bronchospasm [J98.01]  Hyperkalemia [E87.5]       Precautions: none  PLOF:lives at home and is I with all ADLs     ASSESSMENT AND RECOMMENDATIONS:  Based on the objective data described below, the patient presents with declines in ADLs and mobility. Skilled occupational therapy is not indicated at this time. Discharge Recommendations: None  Further Equipment Recommendations for Discharge: N/A      SUBJECTIVE:   Patient stated I want to get out of here.     OBJECTIVE DATA SUMMARY:     Past Medical History:   Diagnosis Date    CAD (coronary artery disease)     MI (12 years ago)    Chronic kidney disease     ESRD x4 years HD Treatment    Hypertension      Past Surgical History:   Procedure Laterality Date    HX ORTHOPAEDIC      left carpal tunnel     HX ROTATOR CUFF REPAIR       Barriers to Learning/Limitations: None  Compensate with: visual, verbal, tactile, kinesthetic cues/model    Home Situation:   Home Situation  Home Environment: Private residence  One/Two Story Residence: One story  Living Alone: Yes  Support Systems: Family member(s)  Patient Expects to be Discharged to[de-identified] House  Current DME Used/Available at Home: None  [x]     Right hand dominant   []     Left hand dominant    Cognitive/Behavioral Status:    A & O x 4; follows multi step commands              Skin: intact  Edema: none noted   Vision/Perceptual:       WFLs                               Coordination: BUE        WFLs        Balance:    intact    Strength: BUE  4/5                  Tone & Sensation: BUE  intact                          Range of Motion: BUE  wfls                             Functional Mobility and Transfers for ADLs:  Bed Mobility:      mod I         Transfers:      mod I                                  ADL Assessment:    wfls            Pain:  Pain level pre-treatment: 0/10   Pain level post-treatment: 0/10   Pain Intervention(s): Medication (see MAR); Rest, Ice, Repositioning  Response to intervention: Nurse notified, See doc flow    Activity Tolerance:   Good     Please refer to the flowsheet for vital signs taken during this treatment. After treatment:   [x]  Patient left in no apparent distress sitting up in chair  []  Patient left in no apparent distress in bed  [x]  Call bell left within reach  [x]  Nursing notified  []  Caregiver present  []  Bed alarm activated    COMMUNICATION/EDUCATION:   [x]      Role of Occupational Therapy in the acute care setting  [x]      Home safety education was provided and the patient/caregiver indicated understanding. []      Patient/family have participated as able and agree with findings and recommendations. []      Patient is unable to participate in plan of care at this time. Thank you for this referral.  Pati Packer MS, OTR/L          Eval Complexity: History: LOW Complexity : Brief history review ; Examination: LOW Complexity : 1-3 performance deficits relating to physical, cognitive , or psychosocial skils that result in activity limitations and / or participation restrictions ;    Decision Making:LOW Complexity : No comorbidities that affect functional and no verbal or physical assistance needed to complete eval tasks

## 2021-08-11 NOTE — PROGRESS NOTES
Pt placed back on 2LNC due to 02SAts dropping to 85% while sleeping. 1030 - Dr. Ana Chadwick made awre that pt's BP is 124/69. Nitro gtt has been off for >1 hr. Aware that 02 has been put back on. Requesting a RT eval and walk test prior to d/c.  Rt made aware

## 2021-08-11 NOTE — PROGRESS NOTES
Dr. sOorio Hernandez rounded on pt at this time. Will give AM BP meds and D/C Nitro gtt 30 min after administration. Will monitor BP and follow up with Dr. Carlyle Basurto later this am. Will ambualte Pt around unit to assess for readiness to D/C.

## 2021-08-11 NOTE — PROGRESS NOTES
Problem: Falls - Risk of  Goal: *Absence of Falls  Description: Document Merlyn Coulter Fall Risk and appropriate interventions in the flowsheet.   Outcome: Progressing Towards Goal  Note: Fall Risk Interventions:            Medication Interventions: Bed/chair exit alarm, Teach patient to arise slowly, Patient to call before getting OOB                   Problem: Patient Education: Go to Patient Education Activity  Goal: Patient/Family Education  Outcome: Progressing Towards Goal     Problem: Chronic Renal Failure  Goal: *Fluid and electrolytes stabilized  Outcome: Progressing Towards Goal     Problem: Patient Education: Go to Patient Education Activity  Goal: Patient/Family Education  Outcome: Progressing Towards Goal     Problem: Hypertension  Goal: *Blood pressure within specified parameters  Outcome: Progressing Towards Goal  Goal: *Fluid volume balance  Outcome: Progressing Towards Goal  Goal: *Labs within defined limits  Outcome: Progressing Towards Goal     Problem: Patient Education: Go to Patient Education Activity  Goal: Patient/Family Education  Outcome: Progressing Towards Goal     Problem: General Medical Care Plan  Goal: *Vital signs within specified parameters  Outcome: Progressing Towards Goal  Goal: *Labs within defined limits  Outcome: Progressing Towards Goal  Goal: *Absence of infection signs and symptoms  Outcome: Progressing Towards Goal  Goal: *Optimal pain control at patient's stated goal  Outcome: Progressing Towards Goal  Goal: *Skin integrity maintained  Outcome: Progressing Towards Goal  Goal: *Fluid volume balance  Outcome: Progressing Towards Goal  Goal: *Optimize nutritional status  Outcome: Progressing Towards Goal  Goal: *Anxiety reduced or absent  Outcome: Progressing Towards Goal  Goal: *Progressive mobility and function (eg: ADL's)  Outcome: Progressing Towards Goal     Problem: Patient Education: Go to Patient Education Activity  Goal: Patient/Family Education  Outcome: Progressing Towards Goal     Problem: Nutrition Deficit  Goal: *Optimize nutritional status  Outcome: Progressing Towards Goal     Problem: Patient Education: Go to Patient Education Activity  Goal: Patient/Family Education  Outcome: Progressing Towards Goal

## 2021-08-11 NOTE — PROGRESS NOTES
Physician Progress Note      PATIENT:               Ning Soria  CSN #:                  [id number]  :                       1975  ADMIT DATE:       8/10/2021 2:19 AM  DISCH DATE:  RESPONDING  PROVIDER #:        Kathleen Hawk MD          QUERY TEXT:      Dear  shannon hospitalist      Pt admitted with pulmonary  edema. Pt noted to have   severe  SOB  and   low sats. If possible, please document in the progress notes and discharge summary if you are evaluating and/or treating any of the following: The medical record reflects the following:    Risk Factors: ESRD   on  HD 1 day late  for last dialysis   session;    Clinical Indicators: RR  30 on admit ;   PCO2  >  50   RA  sat   85%   sats  low on  94%  6L  noted by  EMS  85% oxygen saturation which improved in route on oxygen. Treatment:-  He refused CPAP or BiPAP initially. Oxygen by cannula was continued    Thank you,   Elmo Montilla  RN   CCDS  x [phone]  Options provided:  -- Acute respiratory failure with hypoxia  -- Acute respiratory failure with hypercapnia  -- Acute  respiratory failure   with hypercapnia  and hypoxia  -- Acute on chronic respiratory failure with hypoxia  -- Acute on chronic respiratory failure with hypercapnia  -- Other - I will add my own diagnosis  -- Disagree - Not applicable / Not valid  -- Disagree - Clinically unable to determine / Unknown  -- Refer to Clinical Documentation Reviewer    PROVIDER RESPONSE TEXT:    This patient has acute resp failure with hypoxia and hypercapnia    Query created by: Isha Brar on 8/10/2021 12:53 PM      QUERY TEXT:    Dear shannon hospitalist      Pt admitted with SOB. Pt noted to have pulmonary edema   and elevated  BNP. If possible, please document in progress notes and discharge summary if you are evaluating and/or treating any of the following:     The medical record reflects the following:    Risk Factors:   ESRD  on  HD, last session  1 day late ;    Clinical Indicators:   BNP   2340;    cxr-   Severe cardiomegaly. Patchy and hazy airspace disease throughout both lungs with may represent pulmonary edema  last echo    5/ 21  no results shown ; prior echo in  Oct  2020  with  EF  56%. Treatment: -  Lasix  40mg  IV  given  by  EMS  dialysis treatments continue    Thank you,   Matteo Le RN   CCDS  x [phone]  Options provided:  -- Acute on Chronic Systolic CHF/HFrEF  -- Acute on Chronic Diastolic CHF/HFpEF  -- Acute on Chronic Systolic and Diastolic CHF  -- Acute Systolic CHF/HFrEF  -- Acute Diastolic CHF/HFpEF  -- Acute Systolic and Diastolic CHF  -- Other - I will add my own diagnosis  -- Disagree - Not applicable / Not valid  -- Disagree - Clinically unable to determine / Unknown  -- Refer to Clinical Documentation Reviewer    PROVIDER RESPONSE TEXT:    This patient is in diastolic CHF/HFpEF exacerbation.     Query created by: Klayn Schofield on 8/10/2021 1:15 PM      Electronically signed by:  Perico Vogel MD 8/11/2021 9:40 AM

## 2021-08-11 NOTE — PROGRESS NOTES
Spoke with pt at bedside. Pt is not cleared for discharge today, due to sats falling below acceptable level. Pt will receive an additional dialysis treatment, and walking pulse ox will be done again tomorrow. Pt denies needs at this time. CM following.

## 2021-08-11 NOTE — PROGRESS NOTES
1845- Bedside shift change report given to Lamar Rueda RN (oncoming nurse) by MU Phan (offgoing nurse). Report included the following information SBAR, Kardex, ED Summary, Intake/Output, MAR, Accordion, Recent Results and Cardiac Rhythm NSR. Received patient resting in bed, 4L nc, saturations at 99% on monitor. Patient denies SOB at this time, nc lowered to 3L. Patient denies pain at this time, VSS. Nitro drip remains at 20mcg/ml. 2345- Patient up to bathroom/ Patient returned to bed, complains of pain at R- wrist PIV. PIC flushed, patent, positive blood return, no signs of infiltration at this time. Patient given PRN tylenol PO, and pillow for elevation at this time. 0000- Reassessment completed at this time. No changes noted. VSS. NC lowered to 1L, 97% on monitor. 0345- Phlebo at bedside. 0400- Reassessment completed at this time. Patient sleeping in bed. VSS    0645- Bedside shift change report given to MU Marin (oncoming nurse) by Lamar Rueda RN (offgoing nurse). Report included the following information SBAR, Kardex, ED Summary, Intake/Output, MAR, Accordion, Recent Results and Cardiac Rhythm NSR.

## 2021-08-11 NOTE — PROGRESS NOTES
RT Six Minute Walk Test     Data Measured Before the Walk  Heart Rate: 75  Blood Pressure: 124/69  O2 Saturation: 90 (Room Air)  O2 Device: Room air  O2 Flow Rate:    O2 FIO2:    Alonso Dyspnea Rate: Nothing at all  Alonso Fatigue Scale: Nothing at all    Data Measured During the Walk  Heart Rate: 83  Blood Pressure:    O2 Flow Rate: 0 l/min  O2 Saturation: 87  O2 Flow Rate: 2 l/min  O2 Saturation: 96  :    Symptoms:    Any Problems While Exercising   Alonso Dyspnea Rate: Very slight  Alonso Fatigue Scale: Very slight    Data Measured Immediately After Walk  Did Patient Stop or Pause Before 6 Minutes: No  Why Patient Stopped or Paused:    Predicted Distance: 700 Meters  Total Distance Walked:  363  % Predicted:    Heart Rate:  66  Blood Pressure:    O2 Flow Rate:  2  O2 Saturation:  96  Alonso Dyspnea Rate: Very slight  Alonso Fatigue Scale: Very slight    Data Measured 5 Minutes After Walk  Heart Rate: 69  Blood Pressure:    O2 Saturation: 98  Comments:  RN& notified of results    Additional Comments:     Electronically signed by Fidelia Almeida on 8/11/2021 at 11:06 AM

## 2021-08-12 LAB
ALBUMIN SERPL-MCNC: 3.7 G/DL (ref 3.5–4.7)
ANION GAP SERPL CALC-SCNC: 13 MMOL/L
BASOPHILS # BLD: 0 K/UL (ref 0–0.1)
BASOPHILS NFR BLD: 0 % (ref 0–2)
BUN SERPL-MCNC: 62 MG/DL (ref 9–21)
BUN/CREAT SERPL: 4
CA-I BLD-MCNC: 9 MG/DL (ref 8.5–10.5)
CHLORIDE SERPL-SCNC: 95 MMOL/L (ref 94–111)
CO2 SERPL-SCNC: 29 MMOL/L (ref 21–33)
CREAT SERPL-MCNC: 14.4 MG/DL (ref 0.8–1.5)
EOSINOPHIL # BLD: 0 K/UL (ref 0–0.4)
EOSINOPHIL NFR BLD: 1 % (ref 0–5)
ERYTHROCYTE [DISTWIDTH] IN BLOOD BY AUTOMATED COUNT: 17.8 % (ref 11.6–14.5)
GLUCOSE SERPL-MCNC: 81 MG/DL (ref 70–110)
HCT VFR BLD AUTO: 33.9 % (ref 36–48)
HGB BLD-MCNC: 10.2 G/DL (ref 13–16)
IMM GRANULOCYTES # BLD AUTO: 0 K/UL
IMM GRANULOCYTES NFR BLD AUTO: 0 %
LYMPHOCYTES # BLD: 1.1 K/UL (ref 0.9–3.6)
LYMPHOCYTES NFR BLD: 24 % (ref 21–52)
MCH RBC QN AUTO: 27.6 PG (ref 24–34)
MCHC RBC AUTO-ENTMCNC: 30.1 G/DL (ref 31–37)
MCV RBC AUTO: 91.9 FL (ref 74–97)
MONOCYTES # BLD: 0.4 K/UL (ref 0.05–1.2)
MONOCYTES NFR BLD: 8 % (ref 3–10)
NEUTS SEG # BLD: 3.1 K/UL (ref 1.8–8)
NEUTS SEG NFR BLD: 67 % (ref 40–73)
PHOSPHATE SERPL-MCNC: 8 MG/DL (ref 2.5–4.5)
PLATELET # BLD AUTO: 169 K/UL (ref 135–420)
PMV BLD AUTO: 10.5 FL (ref 9.2–11.8)
POTASSIUM SERPL-SCNC: 5.1 MMOL/L (ref 3.2–5.1)
RBC # BLD AUTO: 3.69 M/UL (ref 4.7–5.5)
SODIUM SERPL-SCNC: 137 MMOL/L (ref 135–145)
WBC # BLD AUTO: 4.6 K/UL (ref 4.6–13.2)

## 2021-08-12 PROCEDURE — 87340 HEPATITIS B SURFACE AG IA: CPT

## 2021-08-12 PROCEDURE — 90935 HEMODIALYSIS ONE EVALUATION: CPT

## 2021-08-12 PROCEDURE — 74011250636 HC RX REV CODE- 250/636: Performed by: PHYSICIAN ASSISTANT

## 2021-08-12 PROCEDURE — 85025 COMPLETE CBC W/AUTO DIFF WBC: CPT

## 2021-08-12 PROCEDURE — 94761 N-INVAS EAR/PLS OXIMETRY MLT: CPT

## 2021-08-12 PROCEDURE — 74011250636 HC RX REV CODE- 250/636: Performed by: INTERNAL MEDICINE

## 2021-08-12 PROCEDURE — 65270000029 HC RM PRIVATE

## 2021-08-12 PROCEDURE — 74011250637 HC RX REV CODE- 250/637: Performed by: PHYSICIAN ASSISTANT

## 2021-08-12 PROCEDURE — 36415 COLL VENOUS BLD VENIPUNCTURE: CPT

## 2021-08-12 PROCEDURE — 65610000006 HC RM INTENSIVE CARE

## 2021-08-12 PROCEDURE — 94618 PULMONARY STRESS TESTING: CPT

## 2021-08-12 PROCEDURE — 77010033678 HC OXYGEN DAILY

## 2021-08-12 PROCEDURE — 80069 RENAL FUNCTION PANEL: CPT

## 2021-08-12 RX ORDER — SODIUM CHLORIDE 9 MG/ML
2000 INJECTION, SOLUTION INTRAVENOUS ONCE
Status: COMPLETED | OUTPATIENT
Start: 2021-08-12 | End: 2021-08-12

## 2021-08-12 RX ADMIN — CALCIUM ACETATE 667 MG: 667 CAPSULE ORAL at 08:00

## 2021-08-12 RX ADMIN — HYDRALAZINE HYDROCHLORIDE 100 MG: 25 TABLET, FILM COATED ORAL at 08:00

## 2021-08-12 RX ADMIN — CALCIUM ACETATE 667 MG: 667 CAPSULE ORAL at 17:00

## 2021-08-12 RX ADMIN — Medication 10 ML: at 17:01

## 2021-08-12 RX ADMIN — Medication 10 ML: at 06:12

## 2021-08-12 RX ADMIN — FUROSEMIDE 40 MG: 10 INJECTION, SOLUTION INTRAMUSCULAR; INTRAVENOUS at 08:00

## 2021-08-12 RX ADMIN — PANTOPRAZOLE SODIUM 40 MG: 40 TABLET, DELAYED RELEASE ORAL at 19:06

## 2021-08-12 RX ADMIN — PANTOPRAZOLE SODIUM 40 MG: 40 TABLET, DELAYED RELEASE ORAL at 08:00

## 2021-08-12 RX ADMIN — NIFEDIPINE 60 MG: 30 TABLET, EXTENDED RELEASE ORAL at 08:00

## 2021-08-12 RX ADMIN — HEPARIN SODIUM 5000 UNITS: 5000 INJECTION INTRAVENOUS; SUBCUTANEOUS at 14:18

## 2021-08-12 RX ADMIN — HEPARIN SODIUM 5000 UNITS: 5000 INJECTION INTRAVENOUS; SUBCUTANEOUS at 22:07

## 2021-08-12 RX ADMIN — HYDRALAZINE HYDROCHLORIDE 100 MG: 25 TABLET, FILM COATED ORAL at 19:05

## 2021-08-12 RX ADMIN — CARVEDILOL 25 MG: 12.5 TABLET, FILM COATED ORAL at 07:58

## 2021-08-12 RX ADMIN — SODIUM CHLORIDE 2000 ML: 9 INJECTION, SOLUTION INTRAVENOUS at 16:34

## 2021-08-12 RX ADMIN — Medication 10 ML: at 22:07

## 2021-08-12 RX ADMIN — CARVEDILOL 25 MG: 12.5 TABLET, FILM COATED ORAL at 19:06

## 2021-08-12 RX ADMIN — CALCIUM ACETATE 667 MG: 667 CAPSULE ORAL at 12:10

## 2021-08-12 NOTE — CONSULTS
NAME:  Nirav Butcher   :   1975   MRN:   [id number]     ATTENDING: Janell Jain MD  PCP:  Denny Garrett MD    Date/Time:  2021 10:08 PM      Subjective:   REQUESTING PHYSICIAN: Dr. Erasto Quigley:    ESRD on HD    Mr. Josiah Case is a  19-year-old -American gentleman with history of ESRD on dialysis every TTS at [facility] [address] in [place], hypertension and history of GI bleed who presented to the emergency room with complaints of shortness of breath. On initial presentation patient had high blood pressure 190/115, initial chest x-ray was suggestive of pulmonary congestion. Patient was treated with bronchodilators, blood pressure control and nitroglycerin with improvement in respiratory status. Urgent hemodialysis arranged yesterday. He tolerated hemodialysis well  Patient seen at bedside today, he is alert and awake, no complaints of any acute shortness of breath, he still on oxygen to nasal cannula  No complaints of any swelling in his lower extremities  Blood pressure control has improved. No complaints of any headaches, no nausea or vomiting      Past Medical History:   Diagnosis Date    CAD (coronary artery disease)     MI (12 years ago)    Chronic kidney disease     ESRD x4 years HD Treatment    Hypertension       Past Surgical History:   Procedure Laterality Date    HX ORTHOPAEDIC      left carpal tunnel     HX ROTATOR CUFF REPAIR       Social History     Tobacco Use    Smoking status: Former Smoker     Years: 15.00    Smokeless tobacco: Never Used    Tobacco comment: l   Substance Use Topics    Alcohol use: Not Currently     Alcohol/week: 1.0 - 2.0 standard drinks     Types: 1 - 2 Glasses of wine per week     Comment: social       History reviewed. No pertinent family history. Allergies   Allergen Reactions    Bactrim [Sulfamethoxazole-Trimethoprim] Rash     Patient had a rash on his penis.        Prior to Admission medications    Medication Sig Start Date End Date Taking? Authorizing Provider   calcium acetate,phosphat bind, (PHOSLO) 667 mg cap as directed. 7/8/21  Yes Yvonne, MD Damaris   NIFEdipine ER (PROCARDIA XL) 60 mg ER tablet Take 1 Tablet by mouth daily. 7/7/21  Yes Zoë Oconnell MD   omeprazole (PRILOSEC) 40 mg capsule Take 40 mg by mouth two (2) times a day. Yes Other, MD Damaris   carvediloL (COREG) 25 mg tablet Take 25 mg by mouth two (2) times daily (with meals). Yes Provider, Historical   hydrALAZINE (APRESOLINE) 100 mg tablet Take 100 mg by mouth three (3) times daily. Yes Provider, Historical   furosemide (LASIX) 40 mg tablet Take 40 mg by mouth daily.    Yes Provider, Historical     Current Facility-Administered Medications   Medication Dose Route Frequency    calcium acetate(phosphat bind) (PHOSLO) capsule 667 mg  1 Capsule Oral TID WITH MEALS    carvediloL (COREG) tablet 25 mg  25 mg Oral BID WITH MEALS    hydrALAZINE (APRESOLINE) tablet 100 mg  100 mg Oral TID    NIFEdipine ER (PROCARDIA XL) tablet 60 mg  60 mg Oral DAILY    pantoprazole (PROTONIX) tablet 40 mg  40 mg Oral BID    sodium chloride (NS) flush 5-40 mL  5-40 mL IntraVENous Q8H    sodium chloride (NS) flush 5-40 mL  5-40 mL IntraVENous PRN    acetaminophen (TYLENOL) tablet 650 mg  650 mg Oral Q6H PRN    Or    acetaminophen (TYLENOL) suppository 650 mg  650 mg Rectal Q6H PRN    polyethylene glycol (MIRALAX) packet 17 g  17 g Oral DAILY PRN    promethazine (PHENERGAN) tablet 12.5 mg  12.5 mg Oral Q6H PRN    Or    ondansetron (ZOFRAN) injection 4 mg  4 mg IntraVENous Q6H PRN    heparin (porcine) injection 5,000 Units  5,000 Units SubCUTAneous Q8H    furosemide (LASIX) injection 40 mg  40 mg IntraVENous DAILY       REVIEW OF SYSTEMS:     Complaints as mentioned in the history of presenting illness, no other significant complaints on complete review of systems    Objective: unchanged in the history of presenting illness,   VITALS:    Visit Vitals  BP (!) 148/92   Pulse 72   Temp 97.2 °F (36.2 °C)   Resp 18   Ht 6' 1\" (1.854 m)   Wt 96 kg (211 lb 10.3 oz)   SpO2 97%   BMI 27.92 kg/m²     Temp (24hrs), Av.1 °F (36.2 °C), Min:96.6 °F (35.9 °C), Max:98 °F (36.7 °C)      PHYSICAL EXAM:   General: alert awake well-oriented, no acute distress. HEENT: EOMI, no Icterus, no Pallor, pupils reactive, mucosa moist, throat clear. Neck: Neck is supple, No JVD, no thyromegaly, no lymphadenopathy. Lungs: breathsounds normal, no respiratory distress on inspection, no rhonchi, no rales,  CVS: heart sounds normal,  no murmurs, no rubs. GI: soft, nontender, normal BS, no palpable organomegaly,   Extremeties: no clubbing, no cyanosis, no edema,  Neuro: Alert, awake, oriented x3, No new focal deficits, moving all extremeties well. Skin: normal skin turgor, no skin rashes or ulcerations. Musculoskeletal: no acute joint swellings,       LAB DATA REVIEWED:    Recent Results (from the past 24 hour(s))   METABOLIC PANEL, BASIC    Collection Time: 21  3:51 AM   Result Value Ref Range    Sodium 139 135 - 145 mmol/L    Potassium 4.2 3.2 - 5.1 mmol/L    Chloride 97 94 - 111 mmol/L    CO2 30 21 - 33 mmol/L    Anion gap 12 mmol/L    Glucose 89 70 - 110 mg/dL    BUN 47 (H) 9 - 21 mg/dL    Creatinine 11.70 (H) 0.8 - 1.50 mg/dL    BUN/Creatinine ratio 4      GFR est AA 6 ml/min/1.73m2    GFR est non-AA 5 ml/min/1.73m2    Calcium 9.2 8.5 - 10.5 mg/dL   CBC WITH AUTOMATED DIFF    Collection Time: 21  3:51 AM   Result Value Ref Range    WBC 4.6 4.6 - 13.2 K/uL    RBC 3.53 (L) 4.70 - 5.50 M/uL    HGB 9.8 (L) 13.0 - 16.0 g/dL    HCT 32.5 (L) 36.0 - 48.0 %    MCV 92.1 74.0 - 97.0 FL    MCH 27.8 24.0 - 34.0 PG    MCHC 30.2 (L) 31.0 - 37.0 g/dL    RDW 17.9 (H) 11.6 - 14.5 %    PLATELET 746 258 - 662 K/uL    MPV 9.3 9.2 - 11.8 FL    NEUTROPHILS 72 40 - 73 %    LYMPHOCYTES 20 (L) 21 - 52 %    MONOCYTES 8 3 - 10 %    EOSINOPHILS 0 0 - 5 %    BASOPHILS 0 0 - 2 %    IMMATURE GRANULOCYTES 0 %    ABS.  NEUTROPHILS 3.3 1.8 - 8.0 K/UL    ABS. LYMPHOCYTES 0.9 0.9 - 3.6 K/UL    ABS. MONOCYTES 0.4 0.05 - 1.2 K/UL    ABS. EOSINOPHILS 0.0 0.0 - 0.4 K/UL    ABS. BASOPHILS 0.0 0.0 - 0.1 K/UL    ABS. IMM. GRANS. 0.0 K/UL       1. End-stage renal disease on hemodialysis: on TTS at [facility] unit  Patient admitted with acute shortness of breath  Status post hemodialysis yesterday, tolerated ultrafiltration of 3 L  Improved clinically, shortness of breath has resolved  no complaints of any fluid overload, no complaints of uremia like nausea or vomiting  Stable electrolytes    Inpatient hemodialysis arranged for tomorrow, We will continue maintenance hemodialysis on TTS    2. Hypertension: Improved blood pressure control  Continue current blood pressure medications and continue to monitor blood pressures  Advised salt restricted diet    3. Anemia: Stable hemoglobin  Continue Epogen with hemodialysis  Continue to monitor H&H    4. Renal osteodystrophy: Stable calcium level  Hyperphosphatemia: continue phosphate binders  We will check phosphorus level and adjust binders as needed  Secondary hyperparathyroidism: Continue Zemplar/Sensipar as per outpatient protocol    5.  Hyperkalemia: Secondary to end-stage renal disease, increased intake  Resolved with hemodialysis yesterday  Continue to monitor potassium levels    Discharge plan: Patient is stable for discharge tomorrow after hemodialysis from renal standpoint        Signed: Efe Gould MD

## 2021-08-12 NOTE — DIALYSIS
Hemodialysis / [facility] / [phone]    Vitals   Pre   Post   Assessment   Pre   Post     Temp  Temp: 97 °F (36.1 °C) (08/12/21 1200)  987.7 LOC  A & O x 4 A & O x 4   HR   Pulse (Heart Rate): 67 (08/12/21 1200) 62 Lungs   Clear, no SOB, 2 liters 02  clear, no SOB, 2 liters 02   B/P   BP: (!) 161/87 (08/12/21 1200) 174/97 Cardiac   reg  reg   Resp   Resp Rate: 18 (08/12/21 1200) 18 Skin   No uncovered wounds noted  no uncovered wounds noted   Pain level  Pain Intensity 1: 1 (08/12/21 0900) Stated discomfort of chest during rinse back.  Stated \"cramp\", which resolved after rinseback of blood Edema  no     no   Orders:    Duration:   Start:    1520 End:    1820 Total:   3   Dialyzer:   Dialyzer/Set Up Inspection: Nell Kapoor (08/10/21 1443)   K Bath:   Dialysate K (mEq/L): 2 (08/10/21 1443)   Ca Bath:   Dialysate CA (mEq/L): 2.5 (08/10/21 1443)   Na/Bicarb:   Dialysate NA (mEq/L): 138 (08/10/21 1443)   Target Fluid Removal:   Goal/Amount of Fluid to Remove (mL): 3000 mL (08/10/21 1443)   Access     Type & Location:   [facility] AVF, 15 gg needles, no s/s of infection / bruit and thrill noted   Labs     Obtained/Reviewed   Critical Results Called   Date when labs were drawn-  Hgb-    HGB   Date Value Ref Range Status   08/12/2021 10.2 (L) 13.0 - 16.0 g/dL Final     K-    Potassium   Date Value Ref Range Status   08/12/2021 5.1 3.2 - 5.1 mmol/L Final     Ca-   Calcium   Date Value Ref Range Status   08/12/2021 9.0 8.5 - 10.5 mg/dL Final     Bun-   BUN   Date Value Ref Range Status   08/12/2021 62 (H) 9 - 21 mg/dL Final     Creat-   Creatinine   Date Value Ref Range Status   08/12/2021 14.40 (H) 0.8 - 1.50 mg/dL Final        Medications/ Blood Products Given     Name   Dose   Route and Time                     Blood Volume Processed (BVP):     Net Fluid   Removed:     Comments   Time Out Done: (WTSP)[id number]  Primary Nurse Rpt Pre:RICKY Alberto   RN  Primary Nurse Rpt Post:CHERRY Soto RN  Pt [address] [place] [address],[address] adherence  Care Plan:continfue with dialysis as ordered  Tx Summary:no SOB, no pain, no complaints,  No distress /  Ilsa present during treatment, but does not have any information as to where patient will attend dialysis, since his clinic is temporarily closed. No answer at all. Also called [facility], no answer. Waiting to hear from Dr Tanner Lynne for possible update. Patient has been given phone number for both clinics to follow up. Update: 1740, Kristin Martin present and gave patient update that she has personally spoken with someone from [facility] who states that patient has same clinic, same chair time, and clinic is not closed. / post treatment, all possible blood returned, hemostasis. Admiting Diagnosis:  Pt's previous Vitor Irwin  Consent signed - Informed Consent Verified: Yes (08/10/21 1443)  [place] Consent -   Hepatitis Status- negative surface antigen 1/3/20 and pending / immune surface antibodies = 248 5/27/21  Machine #- Machine Number: [id number] [facility] (08/10/21 1443)  Telemetry status-monitor by hospital staff  Pre-dialysis wt. -  monitor by hospital staff

## 2021-08-12 NOTE — PROGRESS NOTES
Problem: Falls - Risk of  Goal: *Absence of Falls  Description: Document Swetha Echeverria Fall Risk and appropriate interventions in the flowsheet.   Outcome: Progressing Towards Goal  Note: Fall Risk Interventions:            Medication Interventions: Bed/chair exit alarm, Evaluate medications/consider consulting pharmacy, Teach patient to arise slowly                   Problem: Patient Education: Go to Patient Education Activity  Goal: Patient/Family Education  Outcome: Progressing Towards Goal     Problem: Chronic Renal Failure  Goal: *Fluid and electrolytes stabilized  Outcome: Progressing Towards Goal     Problem: Patient Education: Go to Patient Education Activity  Goal: Patient/Family Education  Outcome: Progressing Towards Goal     Problem: Hypertension  Goal: *Blood pressure within specified parameters  Outcome: Progressing Towards Goal  Goal: *Fluid volume balance  Outcome: Progressing Towards Goal  Goal: *Labs within defined limits  Outcome: Progressing Towards Goal     Problem: Patient Education: Go to Patient Education Activity  Goal: Patient/Family Education  Outcome: Progressing Towards Goal     Problem: General Medical Care Plan  Goal: *Vital signs within specified parameters  Outcome: Progressing Towards Goal  Goal: *Labs within defined limits  Outcome: Progressing Towards Goal  Goal: *Absence of infection signs and symptoms  Outcome: Progressing Towards Goal  Goal: *Optimal pain control at patient's stated goal  Outcome: Progressing Towards Goal  Goal: *Skin integrity maintained  Outcome: Progressing Towards Goal  Goal: *Fluid volume balance  Outcome: Progressing Towards Goal  Goal: *Optimize nutritional status  Outcome: Progressing Towards Goal  Goal: *Anxiety reduced or absent  Outcome: Progressing Towards Goal  Goal: *Progressive mobility and function (eg: ADL's)  Outcome: Progressing Towards Goal     Problem: Patient Education: Go to Patient Education Activity  Goal: Patient/Family Education  Outcome: Progressing Towards Goal     Problem: Nutrition Deficit  Goal: *Optimize nutritional status  Outcome: Progressing Towards Goal     Problem: Patient Education: Go to Patient Education Activity  Goal: Patient/Family Education  Outcome: Progressing Towards Goal

## 2021-08-12 NOTE — DISCHARGE SUMMARY
Discharge Summary       PATIENT ID: Mckenzie Urbano  MRN: [id number]   YOB: 1975    DATE OF ADMISSION: 8/10/2021  2:19 AM    DATE OF DISCHARGE: 08/12/21    PRIMARY CARE PROVIDER: Alban Denton MD     ATTENDING PHYSICIAN: Joce Link MD  DISCHARGING PROVIDER: Joce Link MD        CONSULTATIONS: IP CONSULT TO NEPHROLOGY    PROCEDURES/SURGERIES: * No surgery found *    ADMITTING [facility] COURSE:   Mckenzie Urbano is a 55 y.o. male  has a past medical history of CAD (coronary artery disease), Chronic kidney disease, and Hypertension.       Patient initially reported to emergency department with worsening shortness of breath prior to arrival and concerned about fluid overload. He does have CKD end-stage renal disease on hemodialysis and had a change in his schedule so he would be 1 day overdue.      Because he was profoundly short of breath he called EMS on scene reporting 85% oxygen saturation which improved in route on oxygen. He refused CPAP or BiPAP initially. Oxygen by cannula was continued     Evaluated in the ICU while continuing on the nitroglycerin drip only now complaining of a headache and breathing better after diuresis in the ED. Prior to arriving on her service nephrology was consulted who asked for Kayexalate and indicated that he would arrange for dialysis today.     Patient should be admitted with expected length of stay of 2 midnight.         DISCHARGE DIAGNOSES / PLAN:      Assessment & Plan:         Pulmonary edema  -Lasix 40mg IV  -HD today  - Neph consult        Anemia  -likely secondary to ESRD  -Type and screen  -defer to neph for transfusion decision      Hyperkalemia  -Kaexalate given in ED  -Lasix   -HD today        Hypertension  -NTG drip  -Lasix   -home for BP  -slowly trending lower        ESRD (end stage renal disease) on dialysis ([facility], [place] [address]  -HD today  -Neph consult         Day of dc  This am feels great after hd  We will allow him to walk, if feels well, can dc home, resumes home hd tomorrow    8/12 awaiting HD, he feels great, fine crackles present, awaiting HD, then 6 min walk hopefully confirms no home 02 needed, then can leave. FOLLOW UP APPOINTMENTS:    Follow-up Information     Follow up With Specialties Details Why Emmett Cortez MD [facility]   [address] [address]  [phone]               DIET: renal      DISCHARGE MEDICATIONS:  Current Discharge Medication List      CONTINUE these medications which have NOT CHANGED    Details   calcium acetate,phosphat bind, (PHOSLO) 667 mg cap as directed. NIFEdipine ER (PROCARDIA XL) 60 mg ER tablet Take 1 Tablet by mouth daily. Qty: 90 Tablet, Refills: 2    Associated Diagnoses: Essential hypertension      omeprazole (PRILOSEC) 40 mg capsule Take 40 mg by mouth two (2) times a day. carvediloL (COREG) 25 mg tablet Take 25 mg by mouth two (2) times daily (with meals). hydrALAZINE (APRESOLINE) 100 mg tablet Take 100 mg by mouth three (3) times daily. furosemide (LASIX) 40 mg tablet Take 40 mg by mouth daily. NOTIFY YOUR PHYSICIAN FOR ANY OF THE FOLLOWING:   Fever over 101 degrees for 24 hours. Chest pain, shortness of breath, fever, chills, nausea, vomiting, diarrhea, change in mentation, falling, weakness, bleeding. Severe pain or pain not relieved by medications. Or, any other signs or symptoms that you may have questions about. PHYSICAL EXAMINATION AT DISCHARGE:  General:          Alert, cooperative, no distress, appears stated age. HEENT:           Atraumatic, anicteric sclerae, pink conjunctivae                          No oral ulcers, mucosa moist, throat clear, dentition fair  Neck:               Supple, symmetrical  Lungs:             Clear to auscultation bilaterally. No Wheezing or Rhonchi. No rales. Chest wall:      No tenderness  No Accessory muscle use.   Heart:              + systolic murmur, no edema  Abdomen: Soft, non-tender. Not distended. Bowel sounds normal  Extremities:     No cyanosis. No clubbing,                            Skin turgor normal, Capillary refill normal  Skin:                Not pale. Not Jaundiced  No rashes   Psych:             Not anxious or agitated.   Neurologic:      Alert, moves all extremities, answers questions appropriately and responds to commands       CHRONIC MEDICAL DIAGNOSES:  Problem List as of 8/12/2021 Date Reviewed: 7/8/2021        Codes Class Noted - Resolved    Diarrhea ICD-10-CM: R19.7  ICD-9-CM: 787.91  12/29/2020 - Present        Hyperkalemia ICD-10-CM: E87.5  ICD-9-CM: 276.7  8/10/2021 - Present        Bronchospasm ICD-10-CM: J98.01  ICD-9-CM: 519.11  8/10/2021 - Present        * (Principal) Pulmonary edema ICD-10-CM: J81.1  ICD-9-CM: 568  8/10/2021 - Present        Hypertension ICD-10-CM: I10  ICD-9-CM: 401.9  8/10/2021 - Present        Benign neoplasm of scalp and skin of neck ICD-10-CM: D23.4  ICD-9-CM: 216.4  7/7/2021 - Present        Infection of scalp ICD-10-CM: L08.9  ICD-9-CM: 686.9  6/17/2021 - Present        ESRD (end stage renal disease) on dialysis [facility]) ICD-10-CM: N18.6, Z99.2  ICD-9-CM: 585.6, V45.11  12/29/2020 - Present        Anemia ICD-10-CM: D64.9  ICD-9-CM: 285.9  12/29/2020 - Present        HTN (hypertension) ICD-10-CM: I10  ICD-9-CM: 401.9  12/29/2020 - Present        Acute hyperkalemia ICD-10-CM: E87.5  ICD-9-CM: 276.7  12/29/2020 - Present              Greater than 35 minutes were spent with the patient on counseling and coordination of care    Signed:   Charlotte Gurrola MD  8/12/2021  10:11 AM

## 2021-08-12 NOTE — PROGRESS NOTES
08/12/21 1915   Resting (Room Air)   SpO2 91   HR 78   Resting (On O2)   SpO2 94   HR 78   O2 Device Nasal cannula   O2 Flow Rate (l/min) 1 l/min   Comments   (sats dropped to 87%)   During Walk (Room Air)   SpO2 87   HR 81   Walk/Assistance Device   (none)   Rate of Dyspnea 0   Symptoms Other (comment)   Comments No symptoms   During Walk (On O2)   SpO2 93   HR 78   O2 Device Nasal cannula   O2 Flow Rate (l/min) 1 l/min   Walk/Assistance Device   (none)   Rate of Dyspnea 0   Symptoms   (No symptoms)   After Walk   SpO2 93   HR 78   O2 Device Nasal cannula   O2 Flow Rate (l/min) 1 l/min   Rate of Dyspnea 0   Symptoms Other (comment)  (none)   Does the Patient Qualify for Home O2 Yes   Liter Flow at Rest 0   Liter Flow on Exertion 1   Does the Patient Need Portable Oxygen Tanks Yes     Patient walked for 6 minutes and completed four laos in the ICU. ~After 3 minutes into the walk patient required 1 lpm of Oxygen. Patient had no complaints of SOB during entire walk and did not pause or stop at any time. Patient requiring home oxygen at this time. MD and RN notified.

## 2021-08-12 NOTE — PROGRESS NOTES
0700- Accepted care of pt from CHRISTIN Drake. Pt stable. Sleeping at this time. 0720 -  PT awake, eating breakfast    0800 - :linen changed and pt gave himself a bath. 0900 -  Dr. Rylan Easton made walking rounds at this time. Attempting to contact dialysis to confirm pt's HD chair time. 1100 - Pt stable. Family at bedside. Dialysis nurse will be here this afternoon. Pt aware    1400_ Pt sleeping no distress noted    7859 -  Report to MARY BETH Ybarra RN

## 2021-08-12 NOTE — PROGRESS NOTES
1845- Bedside shift change report given to Erica Benavidez RN (oncoming nurse) by Ortiz Meyer RN (offgoing nurse). Report included the following information SBAR, Kardex, Intake/Output, MAR, Accordion, Recent Results and Cardiac Rhythm NSR. Received patient resting in bed watching tv, patient on 1L NC. Patient denies SOB or pain at this time. 0000- Reassessment completed at this time. Patient getting up to use bathroom as needed, steady on feet. Patient oliguric, but still putting out hazy yellow urine. VSS    0400- Reassessment completed at this time. Patient complaining of R hip/leg soreness at this time. Patient given warm pack. VSS    0600- Patient refusing scheduled heparin at this time. 0515- Bedside shift change report given to MU Marin (oncoming nurse) by Taylor Campos (offgoing nurse).  Report included the following information SBAR, Kardex, Intake/Output, MAR, Accordion, Recent Results and Cardiac Rhythm SR.

## 2021-08-12 NOTE — PROGRESS NOTES
Received care of pt aaox3. Skin w/d. Resp easy and nonlabored. Pt voices no complaints. AV Fistula in [facility]LE[facility]AN[facility]A [employer]. CBWR.

## 2021-08-12 NOTE — PROGRESS NOTES
Per nursing supervisor, Dialysis was not done due to [address]. Supervisor, has notified Zaria Camacho again this am that Dialysis needs to be done. Pt aware. CM following.

## 2021-08-13 VITALS
HEART RATE: 69 BPM | HEIGHT: 73 IN | SYSTOLIC BLOOD PRESSURE: 169 MMHG | BODY MASS INDEX: 28.06 KG/M2 | DIASTOLIC BLOOD PRESSURE: 68 MMHG | RESPIRATION RATE: 18 BRPM | OXYGEN SATURATION: 95 % | WEIGHT: 211.7 LBS | TEMPERATURE: 98.1 F

## 2021-08-13 LAB
ANION GAP SERPL CALC-SCNC: 12 MMOL/L
BASOPHILS # BLD: 0 K/UL (ref 0–0.1)
BASOPHILS NFR BLD: 1 % (ref 0–2)
BUN SERPL-MCNC: 45 MG/DL (ref 9–21)
BUN/CREAT SERPL: 4
CA-I BLD-MCNC: 9.4 MG/DL (ref 8.5–10.5)
CHLORIDE SERPL-SCNC: 94 MMOL/L (ref 94–111)
CO2 SERPL-SCNC: 31 MMOL/L (ref 21–33)
CREAT SERPL-MCNC: 11.1 MG/DL (ref 0.8–1.5)
EOSINOPHIL # BLD: 0 K/UL (ref 0–0.4)
EOSINOPHIL NFR BLD: 1 % (ref 0–5)
ERYTHROCYTE [DISTWIDTH] IN BLOOD BY AUTOMATED COUNT: 17.3 % (ref 11.6–14.5)
GLUCOSE SERPL-MCNC: 75 MG/DL (ref 70–110)
HBV SURFACE AG SER QL: <0.1 INDEX
HBV SURFACE AG SER QL: NEGATIVE
HCT VFR BLD AUTO: 35.3 % (ref 36–48)
HGB BLD-MCNC: 10.5 G/DL (ref 13–16)
IMM GRANULOCYTES # BLD AUTO: 0 K/UL
IMM GRANULOCYTES NFR BLD AUTO: 0 %
LYMPHOCYTES # BLD: 0.9 K/UL (ref 0.9–3.6)
LYMPHOCYTES NFR BLD: 31 % (ref 21–52)
MCH RBC QN AUTO: 27.1 PG (ref 24–34)
MCHC RBC AUTO-ENTMCNC: 29.7 G/DL (ref 31–37)
MCV RBC AUTO: 91 FL (ref 74–97)
MONOCYTES # BLD: 0.3 K/UL (ref 0.05–1.2)
MONOCYTES NFR BLD: 10 % (ref 3–10)
NEUTS SEG # BLD: 1.6 K/UL (ref 1.8–8)
NEUTS SEG NFR BLD: 57 % (ref 40–73)
PLATELET # BLD AUTO: 149 K/UL (ref 135–420)
PMV BLD AUTO: 11.4 FL (ref 9.2–11.8)
POTASSIUM SERPL-SCNC: 4.5 MMOL/L (ref 3.2–5.1)
RBC # BLD AUTO: 3.88 M/UL (ref 4.7–5.5)
SODIUM SERPL-SCNC: 137 MMOL/L (ref 135–145)
WBC # BLD AUTO: 2.8 K/UL (ref 4.6–13.2)

## 2021-08-13 PROCEDURE — 36415 COLL VENOUS BLD VENIPUNCTURE: CPT

## 2021-08-13 PROCEDURE — 94761 N-INVAS EAR/PLS OXIMETRY MLT: CPT

## 2021-08-13 PROCEDURE — 74011250637 HC RX REV CODE- 250/637: Performed by: PHYSICIAN ASSISTANT

## 2021-08-13 PROCEDURE — 77010033678 HC OXYGEN DAILY

## 2021-08-13 PROCEDURE — 80048 BASIC METABOLIC PNL TOTAL CA: CPT

## 2021-08-13 PROCEDURE — 85025 COMPLETE CBC W/AUTO DIFF WBC: CPT

## 2021-08-13 PROCEDURE — 74011250636 HC RX REV CODE- 250/636: Performed by: PHYSICIAN ASSISTANT

## 2021-08-13 RX ADMIN — Medication 10 ML: at 05:30

## 2021-08-13 RX ADMIN — CARVEDILOL 25 MG: 12.5 TABLET, FILM COATED ORAL at 09:05

## 2021-08-13 RX ADMIN — PANTOPRAZOLE SODIUM 40 MG: 40 TABLET, DELAYED RELEASE ORAL at 09:05

## 2021-08-13 RX ADMIN — HEPARIN SODIUM 5000 UNITS: 5000 INJECTION INTRAVENOUS; SUBCUTANEOUS at 05:32

## 2021-08-13 RX ADMIN — CALCIUM ACETATE 667 MG: 667 CAPSULE ORAL at 09:05

## 2021-08-13 RX ADMIN — HYDRALAZINE HYDROCHLORIDE 100 MG: 25 TABLET, FILM COATED ORAL at 09:05

## 2021-08-13 RX ADMIN — NIFEDIPINE 60 MG: 30 TABLET, EXTENDED RELEASE ORAL at 09:06

## 2021-08-13 RX ADMIN — FUROSEMIDE 40 MG: 10 INJECTION, SOLUTION INTRAMUSCULAR; INTRAVENOUS at 09:05

## 2021-08-13 NOTE — PROGRESS NOTES
Bedside shift change report given to RISSA Mclean RN (oncoming nurse) by Tanja Ape. Lang Holter (offgoing nurse). Report included the following information SBAR, Kardex, Intake/Output, MAR, Recent Results and Med Rec Status.

## 2021-08-13 NOTE — PROGRESS NOTES
Problem: Chronic Renal Failure  Goal: *Fluid and electrolytes stabilized  Outcome: Resolved/Met     Problem: Patient Education: Go to Patient Education Activity  Goal: Patient/Family Education  Outcome: Resolved/Met     Problem: General Medical Care Plan  Goal: *Vital signs within specified parameters  Outcome: Resolved/Met  Goal: *Labs within defined limits  Outcome: Resolved/Met  Goal: *Absence of infection signs and symptoms  Outcome: Resolved/Met  Goal: *Optimal pain control at patient's stated goal  Outcome: Resolved/Met  Goal: *Skin integrity maintained  Outcome: Resolved/Met  Goal: *Fluid volume balance  Outcome: Resolved/Met  Goal: *Optimize nutritional status  Outcome: Resolved/Met  Goal: *Anxiety reduced or absent  Outcome: Resolved/Met  Goal: *Progressive mobility and function (eg: ADL's)  Outcome: Resolved/Met     Problem: Patient Education: Go to Patient Education Activity  Goal: Patient/Family Education  Outcome: Resolved/Met     Problem: Tissue Perfusion - Cardiopulmonary, Altered  Goal: *Optimize tissue perfusion  Outcome: Resolved/Met  Goal: *Absence of hypoxia  Outcome: Resolved/Met     Problem: Patient Education: Go to Patient Education Activity  Goal: Patient/Family Education  Outcome: Resolved/Met     Problem: Falls - Risk of  Goal: *Absence of Falls  Description: Document Adam Fall Risk and appropriate interventions in the flowsheet.   Outcome: Resolved/Met     Problem: Patient Education: Go to Patient Education Activity  Goal: Patient/Family Education  Outcome: Resolved/Met

## 2021-08-13 NOTE — ROUTINE PROCESS
Assumed care of pt. Pt resting comfortably in bed watching TV. Pt shows no signs of distress and has no c/o pain or needs at this time. CBWR, 2 side rails up and bed locked in lowest position.

## 2021-08-13 NOTE — PROGRESS NOTES
Problem: Chronic Renal Failure  Goal: *Fluid and electrolytes stabilized  Outcome: Progressing Towards Goal     Problem: Patient Education: Go to Patient Education Activity  Goal: Patient/Family Education  Outcome: Progressing Towards Goal     Problem: General Medical Care Plan  Goal: *Vital signs within specified parameters  Outcome: Progressing Towards Goal  Goal: *Labs within defined limits  Outcome: Progressing Towards Goal  Goal: *Absence of infection signs and symptoms  Outcome: Progressing Towards Goal  Goal: *Optimal pain control at patient's stated goal  Outcome: Progressing Towards Goal  Goal: *Skin integrity maintained  Outcome: Progressing Towards Goal  Goal: *Fluid volume balance  Outcome: Progressing Towards Goal  Goal: *Optimize nutritional status  Outcome: Progressing Towards Goal  Goal: *Anxiety reduced or absent  Outcome: Progressing Towards Goal  Goal: *Progressive mobility and function (eg: ADL's)  Outcome: Progressing Towards Goal     Problem: Patient Education: Go to Patient Education Activity  Goal: Patient/Family Education  Outcome: Progressing Towards Goal     Problem: Tissue Perfusion - Cardiopulmonary, Altered  Goal: *Optimize tissue perfusion  Outcome: Progressing Towards Goal  Goal: *Absence of hypoxia  Outcome: Progressing Towards Goal     Problem: Patient Education: Go to Patient Education Activity  Goal: Patient/Family Education  Outcome: Progressing Towards Goal

## 2021-08-13 NOTE — DISCHARGE SUMMARY
Discharge Summary       PATIENT ID: Ramirez Connolly  MRN: [id number]   YOB: 1975    DATE OF ADMISSION: 8/10/2021  2:19 AM    DATE OF DISCHARGE: 08/13/21    PRIMARY CARE PROVIDER: Lynda Roman MD     ATTENDING PHYSICIAN: Parvin Hernandez MD  DISCHARGING PROVIDER: Parvin Hernandez MD        CONSULTATIONS: IP CONSULT TO NEPHROLOGY    PROCEDURES/SURGERIES: * No surgery found *    ADMITTING [address] [place] [address] COURSE:   Ramirez Connolly is a 55 y.o. male  has a past medical history of CAD (coronary artery disease), Chronic kidney disease, and Hypertension.       Patient initially reported to emergency department with worsening shortness of breath prior to arrival and concerned about fluid overload. He does have CKD end-stage renal disease on hemodialysis and had a change in his schedule so he would be 1 day overdue.      Because he was profoundly short of breath he called EMS on scene reporting 85% oxygen saturation which improved in route on oxygen. He refused CPAP or BiPAP initially. Oxygen by cannula was continued     Evaluated in the ICU while continuing on the nitroglycerin drip only now complaining of a headache and breathing better after diuresis in the ED. Prior to arriving on her service nephrology was consulted who asked for Kayexalate and indicated that he would arrange for dialysis today.     Patient should be admitted with expected length of stay of 2 midnight.         DISCHARGE DIAGNOSES / PLAN:      Assessment & Plan:         Pulmonary edema  -Lasix 40mg IV  -HD today  - Neph consult        Anemia  -likely secondary to ESRD  -Type and screen  -defer to neph for transfusion decision      Hyperkalemia  -Kaexalate given in ED  -Lasix   -HD today        Hypertension  -NTG drip  -Lasix   -home for BP  -slowly trending lower        ESRD (end stage renal disease) on dialysis ([facility] [address]  -HD today  -Neph consult         Day of dc  This am feels great after hd  We will allow him to walk, if feels well, can dc home, resumes home hd tomorrow    8/12 awaiting HD, he feels great, fine crackles present, awaiting HD, then 6 min walk hopefully confirms no home 02 needed, then can leave. 8/13 feels well, aware that dips slightly pulse ox 88% with activity, quickly back up with rest, suspect will cont to improve with continued HD, offered home 02, pt declines, I feel this is reasonable, will take it easy until he sees pcp in a week, and recheck pulse ox after a few more runs of HD. Total dc time 35 mins    FOLLOW UP APPOINTMENTS:    Follow-up Information     Follow up With Specialties Details Why Markos Spear MD [place] [address]  [phone]               DIET: renal      DISCHARGE MEDICATIONS:  Current Discharge Medication List      CONTINUE these medications which have NOT CHANGED    Details   calcium acetate,phosphat bind, (PHOSLO) 667 mg cap as directed. NIFEdipine ER (PROCARDIA XL) 60 mg ER tablet Take 1 Tablet by mouth daily. Qty: 90 Tablet, Refills: 2    Associated Diagnoses: Essential hypertension      omeprazole (PRILOSEC) 40 mg capsule Take 40 mg by mouth two (2) times a day. carvediloL (COREG) 25 mg tablet Take 25 mg by mouth two (2) times daily (with meals). hydrALAZINE (APRESOLINE) 100 mg tablet Take 100 mg by mouth three (3) times daily. furosemide (LASIX) 40 mg tablet Take 40 mg by mouth daily. NOTIFY YOUR PHYSICIAN FOR ANY OF THE FOLLOWING:   Fever over 101 degrees for 24 hours. Chest pain, shortness of breath, fever, chills, nausea, vomiting, diarrhea, change in mentation, falling, weakness, bleeding. Severe pain or pain not relieved by medications. Or, any other signs or symptoms that you may have questions about. PHYSICAL EXAMINATION AT DISCHARGE:  General:          Alert, cooperative, no distress, appears stated age.      HEENT:           Atraumatic, anicteric sclerae, pink conjunctivae No oral ulcers, mucosa moist, throat clear, dentition fair  Neck:               Supple, symmetrical  Lungs:             Clear to auscultation bilaterally. No Wheezing or Rhonchi. No rales. Chest wall:      No tenderness  No Accessory muscle use. Heart:              + systolic murmur, no edema  Abdomen:        Soft, non-tender. Not distended. Bowel sounds normal  Extremities:     No cyanosis. No clubbing,                            Skin turgor normal, Capillary refill normal  Skin:                Not pale. Not Jaundiced  No rashes   Psych:             Not anxious or agitated.   Neurologic:      Alert, moves all extremities, answers questions appropriately and responds to commands       CHRONIC MEDICAL DIAGNOSES:  Problem List as of 8/13/2021 Date Reviewed: 7/8/2021        Codes Class Noted - Resolved    Diarrhea ICD-10-CM: R19.7  ICD-9-CM: 787.91  12/29/2020 - Present        Hyperkalemia ICD-10-CM: E87.5  ICD-9-CM: 276.7  8/10/2021 - Present        Bronchospasm ICD-10-CM: J98.01  ICD-9-CM: 519.11  8/10/2021 - Present        * (Principal) Pulmonary edema ICD-10-CM: J81.1  ICD-9-CM: 024  8/10/2021 - Present        Hypertension ICD-10-CM: I10  ICD-9-CM: 401.9  8/10/2021 - Present        Benign neoplasm of scalp and skin of neck ICD-10-CM: D23.4  ICD-9-CM: 216.4  7/7/2021 - Present        Infection of scalp ICD-10-CM: L08.9  ICD-9-CM: 686.9  6/17/2021 - Present        ESRD (end stage renal disease) on dialysis [facility]) ICD-10-CM: N18.6, Z99.2  ICD-9-CM: 585.6, V45.11  12/29/2020 - Present        Anemia ICD-10-CM: D64.9  ICD-9-CM: 285.9  12/29/2020 - Present        HTN (hypertension) ICD-10-CM: I10  ICD-9-CM: 401.9  12/29/2020 - Present        Acute hyperkalemia ICD-10-CM: E87.5  ICD-9-CM: 276.7  12/29/2020 - Present              Greater than 35 minutes were spent with the patient on counseling and coordination of care    Signed:   Marya James MD  8/13/2021  10:11 AM

## 2021-08-20 ENCOUNTER — OFFICE VISIT (OUTPATIENT)
Dept: FAMILY MEDICINE CLINIC | Age: 46
End: 2021-08-20
Payer: MEDICARE

## 2021-08-20 VITALS
HEIGHT: 73 IN | RESPIRATION RATE: 19 BRPM | HEART RATE: 76 BPM | OXYGEN SATURATION: 98 % | SYSTOLIC BLOOD PRESSURE: 136 MMHG | DIASTOLIC BLOOD PRESSURE: 80 MMHG | WEIGHT: 207 LBS | BODY MASS INDEX: 27.43 KG/M2

## 2021-08-20 DIAGNOSIS — Z99.2 ESRD (END STAGE RENAL DISEASE) ON DIALYSIS (HCC): ICD-10-CM

## 2021-08-20 DIAGNOSIS — I10 ESSENTIAL HYPERTENSION: ICD-10-CM

## 2021-08-20 DIAGNOSIS — J81.0 ACUTE PULMONARY EDEMA (HCC): Primary | ICD-10-CM

## 2021-08-20 DIAGNOSIS — N18.6 ESRD (END STAGE RENAL DISEASE) ON DIALYSIS (HCC): ICD-10-CM

## 2021-08-20 PROCEDURE — 99213 OFFICE O/P EST LOW 20 MIN: CPT | Performed by: FAMILY MEDICINE

## 2021-08-20 PROCEDURE — G8427 DOCREV CUR MEDS BY ELIG CLIN: HCPCS | Performed by: FAMILY MEDICINE

## 2021-08-20 PROCEDURE — G8510 SCR DEP NEG, NO PLAN REQD: HCPCS | Performed by: FAMILY MEDICINE

## 2021-08-20 PROCEDURE — G8419 CALC BMI OUT NRM PARAM NOF/U: HCPCS | Performed by: FAMILY MEDICINE

## 2021-08-20 PROCEDURE — G9231 DOC ESRD DIA TRANS PREG: HCPCS | Performed by: FAMILY MEDICINE

## 2021-08-20 PROCEDURE — 1111F DSCHRG MED/CURRENT MED MERGE: CPT | Performed by: FAMILY MEDICINE

## 2021-08-20 RX ORDER — ALBUTEROL SULFATE 90 UG/1
2 AEROSOL, METERED RESPIRATORY (INHALATION)
Qty: 1 INHALER | Refills: 1 | Status: SHIPPED | OUTPATIENT
Start: 2021-08-20

## 2021-08-20 NOTE — PROGRESS NOTES
Subjective:   Kay Garcia is a 55 y.o. male who was seen for Hospital Follow Up    HPI the patient is a 69-year-old male who is seen after recent discharge from the hospital apparently they have been having problems at dialysis and he has not been receiving the appropriate timely dialysis he went into pulmonary edema went to the hospital received dialysis they suggested he might need oxygen and he refused he wants us to give him an inhaler and I told him that does not help with pulmonary edema he has had no fever or chills he is not a smoker no significant alcohol use. No rash no syncope or loss of consciousness. Bowel movements have been appropriate. Eating relatively well. Nobody at home has been sick    Home Medications    Medication Sig Start Date End Date Taking? Authorizing Provider   calcium acetate,phosphat bind, (PHOSLO) 667 mg cap as directed. 7/8/21  Yes Damaris Russell MD   NIFEdipine ER (PROCARDIA XL) 60 mg ER tablet Take 1 Tablet by mouth daily. 7/7/21  Yes Tanika Wiggins MD   omeprazole (PRILOSEC) 40 mg capsule Take 40 mg by mouth two (2) times a day. Yes Other, MD Damaris   carvediloL (COREG) 25 mg tablet Take 25 mg by mouth two (2) times daily (with meals). Yes Provider, Historical   hydrALAZINE (APRESOLINE) 100 mg tablet Take 100 mg by mouth three (3) times daily. Yes Provider, Historical   furosemide (LASIX) 40 mg tablet Take 40 mg by mouth daily. Yes Provider, Historical      Allergies   Allergen Reactions    Bactrim [Sulfamethoxazole-Trimethoprim] Rash     Patient had a rash on his penis.       Social History     Tobacco Use    Smoking status: Former Smoker     Years: 15.00    Smokeless tobacco: Never Used    Tobacco comment: l   Vaping Use    Vaping Use: Never used   Substance Use Topics    Alcohol use: Not Currently     Alcohol/week: 1.0 - 2.0 standard drinks     Types: 1 - 2 Glasses of wine per week     Comment: social     Drug use: Never            Review of Systems Constitutional: Negative. HENT: Negative. Eyes: Negative. Respiratory: Negative. Cardiovascular: Negative. Gastrointestinal: Negative. Endocrine: Negative. Genitourinary: Negative. Musculoskeletal: Negative. Allergic/Immunologic: Negative. Neurological: Negative. Hematological: Negative. Psychiatric/Behavioral: Negative. Physical Exam   Objective:     Visit Vitals  /80 (BP 1 Location: Right upper arm, BP Patient Position: Sitting, BP Cuff Size: Adult)   Pulse 76   Resp 19   Ht 6' 1\" (1.854 m)   Wt 207 lb (93.9 kg)   SpO2 98%   BMI 27.31 kg/m²      General: alert, cooperative, no distress   Mental  status: normal mood, behavior, speech, dress, motor activity, and thought processes, able to follow commands   HENT: NCAT   Neck: no visualized mass   Resp: no respiratory distress   Neuro: no gross deficits   Skin: no discoloration or lesions of concern on visible areas   Psychiatric: normal affect, consistent with stated mood, no evidence of hallucinations   afebrile. Vital signs are stable. The pupils are equal and reactive. The chest is clear. The cardiovascular exam showed a regular rate and rhythm. The abdomen is benign. Extremities are clear no thyromegaly no lymphadenopathy no murmurs gallops or rubs. Assessment & Plan:     Edema, chronic renal failure this was a 25-minute visit. I am going to give him an albuterol inhaler but I told him he needs to have a 6-minute walk test plus oxygen therapy probably he refuses again today he is aware of my concerns about not having oxygen available I do think the big problem however is that the patient was not getting dialysis on a regular basis not due to his heart but due to the dialysis unit he is followed up with dialysis 3 days a week and saw the nephrologist several days ago. 712    Additional exam findings:        We discussed the expected course, resolution and complications of the diagnosis(es) in detail. Medication risks, benefits, costs, interactions, and alternatives were discussed as indicated. I advised him to contact the office if his condition worsens, changes or fails to improve as anticipated. He expressed understanding with the diagnosis(es) and plan.

## 2021-08-20 NOTE — PROGRESS NOTES
Brenton Diaz presents today for   Chief Complaint   Patient presents with   [facility] Follow Up       Is someone accompanying this pt? No    Is the patient using any DME equipment during OV? No    Depression Screening:  3 most recent PHQ Screens 8/20/2021   Little interest or pleasure in doing things Not at all   Feeling down, depressed, irritable, or hopeless Not at all   Total Score PHQ 2 0       Learning Assessment:  Learning Assessment 2/24/2021   PRIMARY LEARNER Patient   PRIMARY LANGUAGE ENGLISH   LEARNER PREFERENCE PRIMARY OTHER (COMMENT)   ANSWERED BY patient    RELATIONSHIP SELF       Fall Risk  Fall Risk Assessment, last 12 mths 2/25/2021   Able to walk? Yes   Fall in past 12 months? 0   Do you feel unsteady? 0   Are you worried about falling 0       Health Maintenance reviewed and discussed and ordered per Provider. Health Maintenance Due   Topic Date Due    Hepatitis C Screening  Never done    Pneumococcal 0-64 years (1 of 4 - PCV13) Never done    COVID-19 Vaccine (1) Never done    DTaP/Tdap/Td series (1 - Tdap) Never done    Lipid Screen  Never done    Medicare Yearly Exam  Never done   . Coordination of Care:  1. Have you been to the ER, urgent care clinic since your last visit? Hospitalized since your last visit? Yes, today is a follow up from hospital stay. Notes in chart. 2. Have you seen or consulted any other health care providers outside of the [address] [place] [address] since your last visit? Include any pap smears or colon screening.  No

## 2021-08-28 ENCOUNTER — HOSPITAL ENCOUNTER (EMERGENCY)
Age: 46
Discharge: ANOTHER HOSPITAL | End: 2021-08-28
Attending: FAMILY MEDICINE
Payer: MEDICARE

## 2021-08-28 ENCOUNTER — HOSPITAL ENCOUNTER (INPATIENT)
Age: 46
LOS: 4 days | Discharge: HOME | DRG: 377 | End: 2021-09-01
Attending: INTERNAL MEDICINE | Admitting: INTERNAL MEDICINE
Payer: MEDICARE

## 2021-08-28 ENCOUNTER — APPOINTMENT (OUTPATIENT)
Dept: GENERAL RADIOLOGY | Age: 46
End: 2021-08-28
Attending: FAMILY MEDICINE
Payer: MEDICARE

## 2021-08-28 VITALS
OXYGEN SATURATION: 100 % | BODY MASS INDEX: 27.43 KG/M2 | HEART RATE: 77 BPM | SYSTOLIC BLOOD PRESSURE: 175 MMHG | HEIGHT: 73 IN | TEMPERATURE: 98.6 F | RESPIRATION RATE: 18 BRPM | DIASTOLIC BLOOD PRESSURE: 107 MMHG | WEIGHT: 207 LBS

## 2021-08-28 DIAGNOSIS — R09.02 HYPOXIA: ICD-10-CM

## 2021-08-28 DIAGNOSIS — J96.01 ACUTE RESPIRATORY FAILURE WITH HYPOXIA (HCC): ICD-10-CM

## 2021-08-28 DIAGNOSIS — K92.2 UPPER GI BLEED: ICD-10-CM

## 2021-08-28 DIAGNOSIS — I10 ESSENTIAL HYPERTENSION: ICD-10-CM

## 2021-08-28 DIAGNOSIS — Z99.2 END STAGE RENAL FAILURE ON DIALYSIS (HCC): ICD-10-CM

## 2021-08-28 DIAGNOSIS — I50.9 ACUTE ON CHRONIC CONGESTIVE HEART FAILURE, UNSPECIFIED HEART FAILURE TYPE (HCC): ICD-10-CM

## 2021-08-28 DIAGNOSIS — D64.9 SYMPTOMATIC ANEMIA: Primary | ICD-10-CM

## 2021-08-28 DIAGNOSIS — N18.6 END STAGE RENAL FAILURE ON DIALYSIS (HCC): ICD-10-CM

## 2021-08-28 PROBLEM — D62 ACUTE BLOOD LOSS ANEMIA: Status: ACTIVE | Noted: 2021-08-28

## 2021-08-28 LAB
ABO + RH BLD: NORMAL
ALBUMIN SERPL-MCNC: 4.1 G/DL (ref 3.5–4.7)
ALBUMIN/GLOB SERPL: 1.4 {RATIO}
ALP SERPL-CCNC: 65 U/L (ref 38–126)
ALT SERPL-CCNC: 10 U/L (ref 3–72)
ANION GAP SERPL CALC-SCNC: 13 MMOL/L
APPEARANCE UR: CLEAR
ARTERIAL PATENCY WRIST A: ABNORMAL
AST SERPL W P-5'-P-CCNC: 18 U/L (ref 17–74)
BACTERIA URNS QL MICRO: NEGATIVE /HPF
BASE EXCESS BLDA CALC-SCNC: 5.1 MMOL/L (ref 0–2.5)
BASOPHILS # BLD: 0 K/UL (ref 0–0.1)
BASOPHILS NFR BLD: 0 % (ref 0–2)
BDY SITE: ABNORMAL
BILIRUB SERPL-MCNC: 0.5 MG/DL (ref 0.2–1)
BILIRUB UR QL: NEGATIVE
BLOOD GROUP ANTIBODIES SERPL: NEGATIVE
BNP SERPL-MCNC: 2050 PG/ML (ref 0–100)
BUN SERPL-MCNC: 61 MG/DL (ref 9–21)
BUN/CREAT SERPL: 5
CA-I BLD-MCNC: 8.9 MG/DL (ref 8.5–10.5)
CHLORIDE SERPL-SCNC: 100 MMOL/L (ref 94–111)
CO2 SERPL-SCNC: 30 MMOL/L (ref 21–33)
COHGB MFR BLD: 0.2 % (ref 0–3)
COLLECT DATE STL: NORMAL
COLOR UR: YELLOW
COVID-19 RAPID TEST, COVR: NOT DETECTED
CREAT SERPL-MCNC: 11.4 MG/DL (ref 0.8–1.5)
DIFFERENTIAL METHOD BLD: ABNORMAL
EOSINOPHIL # BLD: 0 K/UL (ref 0–0.4)
EOSINOPHIL NFR BLD: 0 % (ref 0–5)
EPITH CASTS URNS QL MICRO: ABNORMAL /LPF (ref 0–20)
ERYTHROCYTE [DISTWIDTH] IN BLOOD BY AUTOMATED COUNT: 19.1 % (ref 11.6–14.5)
GAS FLOW.O2 O2 DELIVERY SYS: 2 L/MIN
GLOBULIN SER CALC-MCNC: 2.9 G/DL
GLUCOSE SERPL-MCNC: 93 MG/DL (ref 70–110)
GLUCOSE UR STRIP.AUTO-MCNC: 100 MG/DL
HCO3 BLDA-SCNC: 30 MMOL/L (ref 23–27)
HCT VFR BLD AUTO: 17.9 % (ref 36–48)
HEMOCCULT SP1 STL QL: POSITIVE
HGB BLD OXIMETRY-MCNC: 12 G/DL (ref 12–16)
HGB BLD-MCNC: 5.3 G/DL (ref 13–16)
HGB UR QL STRIP: NEGATIVE
HISTORY CHECKED?,CKHIST: NORMAL
IMM GRANULOCYTES # BLD AUTO: 0 K/UL
IMM GRANULOCYTES NFR BLD AUTO: 0 %
KETONES UR QL STRIP.AUTO: NEGATIVE MG/DL
LEUKOCYTE ESTERASE UR QL STRIP.AUTO: NEGATIVE
LIPASE SERPL-CCNC: 51 U/L (ref 10–57)
LYMPHOCYTES # BLD: 1.2 K/UL (ref 0.9–3.6)
LYMPHOCYTES NFR BLD: 19 % (ref 21–52)
MAGNESIUM SERPL-MCNC: 2.1 MG/DL (ref 1.7–2.8)
MCH RBC QN AUTO: 27.7 PG (ref 24–34)
MCHC RBC AUTO-ENTMCNC: 29.6 G/DL (ref 31–37)
MCV RBC AUTO: 93.7 FL (ref 78–100)
METHGB MFR BLD: 0.3 % (ref 0–3)
MONOCYTES # BLD: 0.1 K/UL (ref 0.05–1.2)
MONOCYTES NFR BLD: 1 % (ref 3–10)
NEUTS SEG # BLD: 5.1 K/UL (ref 1.8–8)
NEUTS SEG NFR BLD: 80 % (ref 40–73)
NITRITE UR QL STRIP.AUTO: NEGATIVE
NRBC # BLD: 0 K/UL (ref 0–0.01)
NRBC BLD-RTO: 0 PER 100 WBC
OXYHGB MFR BLD: 94 % (ref 90–100)
PCO2 BLDA: 53 MMHG (ref 35–45)
PH BLDA: 7.37 [PH] (ref 7.37–7.43)
PH UR STRIP: >8.5 [PH] (ref 5–8)
PLATELET # BLD AUTO: 180 K/UL (ref 135–420)
PMV BLD AUTO: 11.1 FL (ref 9.2–11.8)
PO2 BLDA: 57 MMHG (ref 84–98)
POTASSIUM SERPL-SCNC: 5.5 MMOL/L (ref 3.2–5.1)
PROT SERPL-MCNC: 7 G/DL (ref 6.1–8.4)
PROT UR STRIP-MCNC: 300 MG/DL
RBC # BLD AUTO: 1.91 M/UL (ref 4.35–5.65)
RBC #/AREA URNS HPF: ABNORMAL /HPF (ref 0–2)
RBC MORPH BLD: ABNORMAL
SAO2 % BLD: 85 % (ref 94–100)
SAO2% DEVICE SAO2% SENSOR NAME: ABNORMAL
SERVICE CMNT-IMP: ABNORMAL
SODIUM SERPL-SCNC: 143 MMOL/L (ref 135–145)
SP GR UR REFRACTOMETRY: 1.01 (ref 1–1.03)
SPECIMEN EXP DATE BLD: NORMAL
SPECIMEN SITE: ABNORMAL
SPECIMEN SOURCE: NORMAL
TROPONIN I SERPL-MCNC: 0.11 NG/ML (ref 0.02–0.05)
UROBILINOGEN UR QL STRIP.AUTO: 0.2 EU/DL (ref 0.2–1)
WBC # BLD AUTO: 6.4 K/UL (ref 4.6–13.2)
WBC URNS QL MICRO: ABNORMAL /HPF (ref 0–4)

## 2021-08-28 PROCEDURE — 36600 WITHDRAWAL OF ARTERIAL BLOOD: CPT

## 2021-08-28 PROCEDURE — 87635 SARS-COV-2 COVID-19 AMP PRB: CPT

## 2021-08-28 PROCEDURE — C9113 INJ PANTOPRAZOLE SODIUM, VIA: HCPCS | Performed by: EMERGENCY MEDICINE

## 2021-08-28 PROCEDURE — 30233N1 TRANSFUSION OF NONAUTOLOGOUS RED BLOOD CELLS INTO PERIPHERAL VEIN, PERCUTANEOUS APPROACH: ICD-10-PCS | Performed by: INTERNAL MEDICINE

## 2021-08-28 PROCEDURE — 81001 URINALYSIS AUTO W/SCOPE: CPT

## 2021-08-28 PROCEDURE — 94640 AIRWAY INHALATION TREATMENT: CPT

## 2021-08-28 PROCEDURE — 86901 BLOOD TYPING SEROLOGIC RH(D): CPT

## 2021-08-28 PROCEDURE — 65660000000 HC RM CCU STEPDOWN

## 2021-08-28 PROCEDURE — 83735 ASSAY OF MAGNESIUM: CPT

## 2021-08-28 PROCEDURE — 99285 EMERGENCY DEPT VISIT HI MDM: CPT

## 2021-08-28 PROCEDURE — 93005 ELECTROCARDIOGRAM TRACING: CPT

## 2021-08-28 PROCEDURE — 84484 ASSAY OF TROPONIN QUANT: CPT

## 2021-08-28 PROCEDURE — 74011000250 HC RX REV CODE- 250: Performed by: FAMILY MEDICINE

## 2021-08-28 PROCEDURE — 96374 THER/PROPH/DIAG INJ IV PUSH: CPT

## 2021-08-28 PROCEDURE — P9016 RBC LEUKOCYTES REDUCED: HCPCS

## 2021-08-28 PROCEDURE — 80053 COMPREHEN METABOLIC PANEL: CPT

## 2021-08-28 PROCEDURE — 96375 TX/PRO/DX INJ NEW DRUG ADDON: CPT

## 2021-08-28 PROCEDURE — 74011250637 HC RX REV CODE- 250/637: Performed by: FAMILY MEDICINE

## 2021-08-28 PROCEDURE — 82272 OCCULT BLD FECES 1-3 TESTS: CPT

## 2021-08-28 PROCEDURE — 71045 X-RAY EXAM CHEST 1 VIEW: CPT

## 2021-08-28 PROCEDURE — 74011250636 HC RX REV CODE- 250/636: Performed by: EMERGENCY MEDICINE

## 2021-08-28 PROCEDURE — 36430 TRANSFUSION BLD/BLD COMPNT: CPT

## 2021-08-28 PROCEDURE — 83690 ASSAY OF LIPASE: CPT

## 2021-08-28 PROCEDURE — 36415 COLL VENOUS BLD VENIPUNCTURE: CPT

## 2021-08-28 PROCEDURE — 86923 COMPATIBILITY TEST ELECTRIC: CPT

## 2021-08-28 PROCEDURE — 83880 ASSAY OF NATRIURETIC PEPTIDE: CPT

## 2021-08-28 PROCEDURE — 74011000250 HC RX REV CODE- 250: Performed by: INTERNAL MEDICINE

## 2021-08-28 PROCEDURE — C9113 INJ PANTOPRAZOLE SODIUM, VIA: HCPCS | Performed by: INTERNAL MEDICINE

## 2021-08-28 PROCEDURE — 82803 BLOOD GASES ANY COMBINATION: CPT

## 2021-08-28 PROCEDURE — 85025 COMPLETE CBC W/AUTO DIFF WBC: CPT

## 2021-08-28 PROCEDURE — 74011250636 HC RX REV CODE- 250/636: Performed by: INTERNAL MEDICINE

## 2021-08-28 PROCEDURE — 94660 CPAP INITIATION&MGMT: CPT

## 2021-08-28 PROCEDURE — 74011000250 HC RX REV CODE- 250: Performed by: EMERGENCY MEDICINE

## 2021-08-28 RX ORDER — ACETAMINOPHEN 325 MG/1
650 TABLET ORAL
Status: DISCONTINUED | OUTPATIENT
Start: 2021-08-28 | End: 2021-09-01 | Stop reason: HOSPADM

## 2021-08-28 RX ORDER — IPRATROPIUM BROMIDE AND ALBUTEROL SULFATE 2.5; .5 MG/3ML; MG/3ML
3 SOLUTION RESPIRATORY (INHALATION)
Status: DISCONTINUED | OUTPATIENT
Start: 2021-08-28 | End: 2021-09-01 | Stop reason: HOSPADM

## 2021-08-28 RX ORDER — SODIUM CHLORIDE 0.9 % (FLUSH) 0.9 %
5-40 SYRINGE (ML) INJECTION EVERY 8 HOURS
Status: DISCONTINUED | OUTPATIENT
Start: 2021-08-28 | End: 2021-09-01 | Stop reason: HOSPADM

## 2021-08-28 RX ORDER — BENZONATATE 100 MG/1
200 CAPSULE ORAL
Status: COMPLETED | OUTPATIENT
Start: 2021-08-28 | End: 2021-08-28

## 2021-08-28 RX ORDER — ONDANSETRON 2 MG/ML
4 INJECTION INTRAMUSCULAR; INTRAVENOUS
Status: DISCONTINUED | OUTPATIENT
Start: 2021-08-28 | End: 2021-09-01 | Stop reason: HOSPADM

## 2021-08-28 RX ORDER — SODIUM CHLORIDE 9 MG/ML
250 INJECTION, SOLUTION INTRAVENOUS AS NEEDED
Status: DISCONTINUED | OUTPATIENT
Start: 2021-08-28 | End: 2021-09-01 | Stop reason: HOSPADM

## 2021-08-28 RX ORDER — HYDRALAZINE HYDROCHLORIDE 20 MG/ML
20 INJECTION INTRAMUSCULAR; INTRAVENOUS ONCE
Status: COMPLETED | OUTPATIENT
Start: 2021-08-28 | End: 2021-08-28

## 2021-08-28 RX ORDER — FUROSEMIDE 10 MG/ML
80 INJECTION INTRAMUSCULAR; INTRAVENOUS ONCE
Status: COMPLETED | OUTPATIENT
Start: 2021-08-28 | End: 2021-08-28

## 2021-08-28 RX ORDER — POLYETHYLENE GLYCOL 3350 17 G/17G
17 POWDER, FOR SOLUTION ORAL DAILY PRN
Status: DISCONTINUED | OUTPATIENT
Start: 2021-08-28 | End: 2021-09-01 | Stop reason: HOSPADM

## 2021-08-28 RX ORDER — FUROSEMIDE 10 MG/ML
100 INJECTION INTRAMUSCULAR; INTRAVENOUS
Status: COMPLETED | OUTPATIENT
Start: 2021-08-28 | End: 2021-08-28

## 2021-08-28 RX ORDER — IPRATROPIUM BROMIDE AND ALBUTEROL SULFATE 2.5; .5 MG/3ML; MG/3ML
3 SOLUTION RESPIRATORY (INHALATION)
Status: COMPLETED | OUTPATIENT
Start: 2021-08-28 | End: 2021-08-28

## 2021-08-28 RX ORDER — ONDANSETRON 4 MG/1
4 TABLET, ORALLY DISINTEGRATING ORAL
Status: DISCONTINUED | OUTPATIENT
Start: 2021-08-28 | End: 2021-09-01 | Stop reason: HOSPADM

## 2021-08-28 RX ORDER — HYDRALAZINE HYDROCHLORIDE 20 MG/ML
20 INJECTION INTRAMUSCULAR; INTRAVENOUS
Status: DISCONTINUED | OUTPATIENT
Start: 2021-08-28 | End: 2021-09-01 | Stop reason: HOSPADM

## 2021-08-28 RX ORDER — SODIUM CHLORIDE 0.9 % (FLUSH) 0.9 %
5-40 SYRINGE (ML) INJECTION AS NEEDED
Status: DISCONTINUED | OUTPATIENT
Start: 2021-08-28 | End: 2021-09-01 | Stop reason: HOSPADM

## 2021-08-28 RX ORDER — ACETAMINOPHEN 650 MG/1
650 SUPPOSITORY RECTAL
Status: DISCONTINUED | OUTPATIENT
Start: 2021-08-28 | End: 2021-09-01 | Stop reason: HOSPADM

## 2021-08-28 RX ADMIN — SODIUM CHLORIDE 40 MG: 9 INJECTION, SOLUTION INTRAMUSCULAR; INTRAVENOUS; SUBCUTANEOUS at 10:06

## 2021-08-28 RX ADMIN — HYDRALAZINE HYDROCHLORIDE 20 MG: 20 INJECTION INTRAMUSCULAR; INTRAVENOUS at 10:05

## 2021-08-28 RX ADMIN — IPRATROPIUM BROMIDE AND ALBUTEROL SULFATE 3 ML: .5; 3 SOLUTION RESPIRATORY (INHALATION) at 20:42

## 2021-08-28 RX ADMIN — FUROSEMIDE 80 MG: 10 INJECTION, SOLUTION INTRAMUSCULAR; INTRAVENOUS at 20:43

## 2021-08-28 RX ADMIN — BENZONATATE 200 MG: 100 CAPSULE ORAL at 06:36

## 2021-08-28 RX ADMIN — Medication 10 ML: at 20:46

## 2021-08-28 RX ADMIN — FUROSEMIDE 100 MG: 10 INJECTION, SOLUTION INTRAMUSCULAR; INTRAVENOUS at 10:05

## 2021-08-28 RX ADMIN — IPRATROPIUM BROMIDE AND ALBUTEROL SULFATE 3 ML: .5; 3 SOLUTION RESPIRATORY (INHALATION) at 04:48

## 2021-08-28 RX ADMIN — PANTOPRAZOLE SODIUM 40 MG: 40 INJECTION, POWDER, FOR SOLUTION INTRAVENOUS at 20:44

## 2021-08-28 NOTE — PROGRESS NOTES
IV access obtained and lasix given. Patient placed on BIPAP for increased WOB. Patient currently tolerating well with SPO2 100% and decreased accessory muscle usage. Will continue to monitor.

## 2021-08-28 NOTE — PROGRESS NOTES
Pt agreed to EJ of which was established by Dr Simone Nugent. RT in room to place patient on bipap. Pt tolerating well and respiratory status has improved since initiation of bipap. Pt resting in position of comfort. Vitals stable.

## 2021-08-28 NOTE — PROGRESS NOTES
TRANSFER - IN REPORT:    Verbal report received from MU VANCE(name) on Ronda Pacheco  being received from [facility] ([facility]) (unit) for routine progression of care      Report consisted of patients Situation, Background, Assessment and   Recommendations(SBAR). Information from the following report(s) SBAR, Kardex, ED Summary, Intake/Output, MAR, Accordion and Recent Results was reviewed with the receiving nurse. Opportunity for questions and clarification was provided. Assessment completed upon patients arrival to unit and care assumed. Bedside and Verbal shift change report given to [address], RN (oncoming nurse) by 108 Tanika Louis RN (offgoing nurse). Report included the following information SBAR, Kardex, Intake/Output, MAR, Accordion and Recent Results.

## 2021-08-28 NOTE — H&P
Hospitalist Admission Note    NAME: Krupa Prakash   :  1975   MRN:  [id number]     Date/Time:  2021 6:19 PM    Patient PCP: Ina Rossi MD  ________________________________________________________________________    Given the patient's current clinical presentation, I have a high level of concern for decompensation if discharged from the emergency department. Complex decision making was performed, which includes reviewing the patient's available past medical records, laboratory results, and x-ray films. My assessment of this patient's clinical condition and my plan of care is as follows. Assessment / Plan:    #ABLA  UGIB: Likely gastric ulcer in s/o copious NSAID use. Presently hemodynamically stable to volume overloaded. He agrees to transfusion.    - IV PPI BID   - Transfuse 1u pRBC now, trend    - Monitor for fluid overload, but likely can do HD tmr   - No further NSAIDs   - Ensure IV access   - Tele, serial H/H   - NPO at midnight for likely EGD    #AHRF: Mild here requiring 2L nc, but had briefly been on BiPAP at OSH. He is vaccinated against covid and had a negative rapid test at OSH. Favor pulmonary edema having missed HD today. - C/s Renal for HD, will need after blood transfusion, likely in AM     #HTN: High here in s/o missed HD. On nifedipine 60mg, carved 25mg, hydral 100mg TID, furosemide 40mg as outpt   - Hold in s/o GI bleed    #CAD: No chest pain at present   - hold carvedilol    #L hip pain: Has led to him taking excess NSAIDs. Favor OA   - L hip plain film          I have personally reviewed the radiographs, laboratory data in Epic and decisions and statements above are based partially on this personal interpretation. Code Status: Full Code  DVT Prophylaxis: SCD's  GI Prophylaxis: PPI       Subjective:   CHIEF COMPLAINT: \"fatigue, wheatley\"    HISTORY OF PRESENT ILLNESS:     Magali Cedeno is a 55 y.o. AAM hx CAD, ESRD p/w 2 weeks of fatigue, wheatley.  He presented to outside hospital where he also noted 2 weeks of black stools. He says he has severe L hip pain, in his buttocks, for which he takes 800mg + advil, and 2+ BC Powders daily. He does not drink alcohol. He denies lightheadedness, dizziness, syncope. No hematemsis. At OSH he is found ot have Hb 5.5. Initially mildly hypoxemic but with increased WOB he was placed on BiPAP. He normally gets HD TThSat and missed today. On arrival, he is satting well on 2L nc. Past Medical History:   Diagnosis Date    CAD (coronary artery disease)     MI (12 years ago)    Chronic kidney disease     ESRD x4 years HD Treatment    Hypertension       Past Surgical History:   Procedure Laterality Date    HX ORTHOPAEDIC      left carpal tunnel     HX ROTATOR CUFF REPAIR       Social History     Tobacco Use    Smoking status: Former Smoker     Years: 15.00    Smokeless tobacco: Never Used    Tobacco comment: l   Substance Use Topics    Alcohol use: Not Currently     Alcohol/week: 1.0 - 2.0 standard drinks     Types: 1 - 2 Glasses of wine per week     Comment: social       Family History   Problem Relation Age of Onset    Hypertension Mother     Hypertension Father     Hypertension Maternal Aunt         Allergies   Allergen Reactions    Bactrim [Sulfamethoxazole-Trimethoprim] Rash     Patient had a rash on his penis. Prior to Admission medications    Medication Sig Start Date End Date Taking? Authorizing Provider   albuterol (PROVENTIL HFA, VENTOLIN HFA, PROAIR HFA) 90 mcg/actuation inhaler Take 2 Puffs by inhalation every four (4) hours as needed for Wheezing. 8/20/21   Woodrow Sullivan MD   calcium acetate,phosphat bind, (PHOSLO) 667 mg cap as directed. 7/8/21   Damaris Russell MD   NIFEdipine ER (PROCARDIA XL) 60 mg ER tablet Take 1 Tablet by mouth daily. 7/7/21   Woodrow Sullivan MD   omeprazole (PRILOSEC) 40 mg capsule Take 40 mg by mouth two (2) times a day.     Damaris Russell MD   carvediloL (COREG) 25 mg tablet Take 25 mg by mouth two (2) times daily (with meals). Provider, Historical   hydrALAZINE (APRESOLINE) 100 mg tablet Take 100 mg by mouth three (3) times daily. Provider, Historical   furosemide (LASIX) 40 mg tablet Take 40 mg by mouth daily.     Provider, Historical     REVIEW OF SYSTEMS:  See HPI for details  General: negative for fever, chills, sweats, weakness, weight loss  Eyes: negative for blurred vision, eye pain, loss of vision, diplopia  Ear Nose and Throat: negative for rhinorrhea, pharyngitis, otalgia, tinnitus, speech or swallowing difficulties  Respiratory:  negative for pleuritic pain, +cough, +sputum production, wheezing,+ SOB,+ JON  Cardiology:  negative for chest pain, palpitations, orthopnea, PND, edema, syncope   Gastrointestinal: negative for +abdominal pain, +N/V, dysphagia, c+hange in bowel habits,+ bleeding  Genitourinary: negative for frequency, urgency, dysuria, hematuria, incontinence  Muskuloskeletal : negative for arthralgia, myalgia  Hematology: negative for easy bruising, bleeding, lymphadenopathy  Dermatological: negative for rash, ulceration, mole change, new lesion  Endocrine: negative for hot flashes or polydipsia  Neurological: negative for headache, dizziness, confusion, focal weakness, paresthesia, memory loss, gait disturbance  Psychological: negative for anxiety, depression, agitation    Objective:   VITALS:    Visit Vitals  BP (!) 175/85 (BP 1 Location: Right upper arm, BP Patient Position: At rest)   Pulse 75   Temp 97.6 °F (36.4 °C)   Resp 26   SpO2 100%     PHYSICAL EXAM:    Physical Exam:    Gen: Well-developed, well-nourished, in no acute distress  HEENT:  Pink conjunctivae, PERRL, hearing intact to voice, moist mucous membranes  Neck: Supple, without masses, thyroid non-tender  Resp: Basilar rales  Card: No murmurs, normal S1, S2 without thrills, bruits or peripheral edema  Abd:  Soft, non-tender, non-distended, normoactive bowel sounds are present, no palpable organomegaly and no detectable hernias  Lymph:  No cervical or inguinal adenopathy  Musc: No cyanosis or clubbing  Skin: No rashes or ulcers, skin turgor is good  Neuro:  Cranial nerves are grossly intact, no focal motor weakness, follows commands appropriately  Psych:  Good insight, oriented to person, place and time, alert          _______________________________________________________________________  Care Plan discussed with:    Comments   Patient x Discussed with patient in room. POC outlined and Questions answered    Family      RN x    Care Manager                    Consultant:  montse BETHEA MD   _______________________________________________________________________  Recommended Disposition:   Home with Family x   HH/PT/OT/RN    SNF/LTC    JOSÉ MANUEL    ________________________________________________________________________  TOTAL TIME:  45 Minutes        Comments   >50% of visit spent in counseling and coordination of care  Chart reviewed  Discussion with patient and/or family and questions answered     ________________________________________________________________________  Signed: Lynda Vazquez MD    This note will not be viewable in [address] Procedures: see electronic medical records for all procedures/Xrays and details which were not copied into this note but were reviewed prior to creation of Plan.     LAB DATA REVIEWED:    Recent Results (from the past 24 hour(s))   BLOOD GAS, ARTERIAL    Collection Time: 08/28/21  3:15 AM   Result Value Ref Range    pH 7.37 7.37 - 7.43      PCO2 53 (H) 35.0 - 45.0 mmHg    PO2 57 (L) 84 - 98 mmHg    O2 SAT 85 (L) 94 - 100 %    BICARBONATE 30 (H) 23.0 - 27.0 mmol/L    BASE EXCESS 5.1 (H) 0.0 - 2.5 mmol/L    O2 METHOD Nasal Cannula      O2 FLOW RATE 2 L/min    Sample source Arterial      SITE Right Radial      CALLUM'S TEST PASS      Critical value read back [facility] CALLED ER     Carboxy-Hgb 0.2 0 - 3 %    Methemoglobin 0.3 0 - 3 %    tHb 12 12.0 - 16.0 g/dL    Oxyhemoglobin 94 90 - 100 % URINALYSIS W/ RFLX MICROSCOPIC    Collection Time: 08/28/21  3:15 AM   Result Value Ref Range    Color Yellow      Appearance Clear      Specific gravity 1.010 1.005 - 1.030      pH (UA) >8.5 (H) 5.0 - 8.0    Protein 300 (A) Negative mg/dL    Glucose 100 (A) Negative mg/dL    Ketone Negative Negative mg/dL    Bilirubin Negative Negative      Blood Negative Negative      Urobilinogen 0.2 0.2 - 1.0 EU/dL    Nitrites Negative Negative      Leukocyte Esterase Negative Negative     URINE MICROSCOPIC    Collection Time: 08/28/21  3:15 AM   Result Value Ref Range    WBC 0-4 0 - 4 /hpf    RBC 0-5 0 - 2 /hpf    Epithelial cells Few 0 - 20 /lpf    Bacteria Negative (A) None /hpf   CBC WITH AUTOMATED DIFF    Collection Time: 08/28/21  5:20 AM   Result Value Ref Range    WBC 6.4 4.6 - 13.2 K/uL    RBC 1.91 (L) 4.35 - 5.65 M/uL    HGB 5.3 (LL) 13.0 - 16.0 g/dL    HCT 17.9 (LL) 36.0 - 48.0 %    MCV 93.7 78.0 - 100.0 FL    MCH 27.7 24.0 - 34.0 PG    MCHC 29.6 (L) 31.0 - 37.0 g/dL    RDW 19.1 (H) 11.6 - 14.5 %    PLATELET 024 566 - 710 K/uL    MPV 11.1 9.2 - 11.8 FL    NRBC 0.0 0.0  WBC    ABSOLUTE NRBC 0.00 0.00 - 0.01 K/uL    NEUTROPHILS 80 (H) 40 - 73 %    LYMPHOCYTES 19 (L) 21 - 52 %    MONOCYTES 1 (L) 3 - 10 %    EOSINOPHILS 0 0 - 5 %    BASOPHILS 0 0 - 2 %    IMMATURE GRANULOCYTES 0 %    ABS. NEUTROPHILS 5.1 1.8 - 8.0 K/UL    ABS. LYMPHOCYTES 1.2 0.9 - 3.6 K/UL    ABS. MONOCYTES 0.1 0.05 - 1.2 K/UL    ABS. EOSINOPHILS 0.0 0.0 - 0.4 K/UL    ABS. BASOPHILS 0.0 0.0 - 0.1 K/UL    ABS. IMM.  GRANS. 0.0 K/UL    DF AUTOMATED      RBC COMMENTS Anisocytosis  1+        RBC COMMENTS Macrocytosis  1+        RBC COMMENTS Hypochromia  1+       METABOLIC PANEL, COMPREHENSIVE    Collection Time: 08/28/21  5:20 AM   Result Value Ref Range    Sodium 143 135 - 145 mmol/L    Potassium 5.5 (H) 3.2 - 5.1 mmol/L    Chloride 100 94 - 111 mmol/L    CO2 30 21 - 33 mmol/L    Anion gap 13 mmol/L    Glucose 93 70 - 110 mg/dL    BUN 61 (H) 9 - 21 mg/dL    Creatinine 11.40 (H) 0.8 - 1.50 mg/dL    BUN/Creatinine ratio 5      GFR est AA 6 ml/min/1.73m2    GFR est non-AA 5 ml/min/1.73m2    Calcium 8.9 8.5 - 10.5 mg/dL    Bilirubin, total 0.5 0.2 - 1.0 mg/dL    AST (SGOT) 18 17 - 74 U/L    ALT (SGPT) 10 3 - 72 U/L    Alk.  phosphatase 65 38 - 126 U/L    Protein, total 7.0 6.1 - 8.4 g/dL    Albumin 4.1 3.5 - 4.7 g/dL    Globulin 2.9 g/dL    A-G Ratio 1.4     LIPASE    Collection Time: 08/28/21  5:20 AM   Result Value Ref Range    Lipase 51 10 - 57 U/L   MAGNESIUM    Collection Time: 08/28/21  5:20 AM   Result Value Ref Range    Magnesium 2.1 1.7 - 2.8 mg/dL   TROPONIN I    Collection Time: 08/28/21  5:20 AM   Result Value Ref Range    Troponin-I, Qt. 0.11 (H) 0.02 - 0.05 ng/mL   BNP    Collection Time: 08/28/21  5:20 AM   Result Value Ref Range    BNP 2,050 (H) 0 - 100 pg/mL   TYPE & SCREEN    Collection Time: 08/28/21  5:20 AM   Result Value Ref Range    Crossmatch Expiration 08/31/2021,2359     ABO/Rh(D) AB Positive     Antibody screen Negative    OCCULT BLOOD, STOOL    Collection Time: 08/28/21  7:50 AM   Result Value Ref Range    Occult Blood,day 1 Positive      Day 1 date: 8,282,021     EKG, 12 LEAD, INITIAL    Collection Time: 08/28/21  9:45 AM   Result Value Ref Range    Ventricular Rate 83 BPM    Atrial Rate 83 BPM    P-R Interval 173 ms    QRS Duration 92 ms    Q-T Interval 362 ms    QTC Calculation (Bezet) 426 ms    Calculated P Axis 20 degrees    Calculated R Axis -22 degrees    Calculated T Axis 114 degrees    Diagnosis       Sinus rhythm  LVH with secondary repolarization abnormality  Baseline wander in lead(s) V1,V5     COVID-19 RAPID TEST    Collection Time: 08/28/21 10:00 AM   Result Value Ref Range    Specimen source Nasopharyngeal      COVID-19 rapid test Not Detected Not Detected

## 2021-08-28 NOTE — ED PROVIDER NOTES
EMERGENCY DEPARTMENT HISTORY AND PHYSICAL EXAM      Date: 8/28/2021  Patient Name: Krupa Prakash    History of Presenting Illness     Chief Complaint   Patient presents with    Shortness of Breath       History Provided By: Patient and EMS    HPI: Krupa Prakash, 55 y.o. male with a past medical history significant hypertension, myocardial infarction and anemia requiring blood transfusion in the past, bronchospasm ESRD on HD T/Th/Sat presents to the ED with cc of SOB that started in the middle of the night. Patient is due for dialysis at 0500 this morning. EMS was dispatched for shortness of breath. They arrived and found patient with an initial O2 sat of 79% on room air. He was with diminished aeration and bilateral upper lobe wheezing. He placed him on 2 L of oxygen and gave him a breathing treatment. He improved to 98% on room air with decreased air hunger. Upon arrival to the ER, patient states he feels much better. He is breathing at 92% on room air and states his shortness of breath is much better. He states last time he was at dialysis, his dialysis nurse told him his shortness of breath could be from anemia. He has not noticed any blood per mouth or rectum. He had his Covid vaccinations and has not had any recent travel or dietary changes. No chest pain or palpitations. No abdominal pain, nausea, vomiting, diarrhea, constipation. Patient still makes urine, denies dysuria or hematuria. No numbness, tingling, syncope. Nephrologist is Dr. Esme Leo. There are no other complaints, changes, or physical findings at this time. PCP: Ina Rossi MD    No current facility-administered medications on file prior to encounter. Current Outpatient Medications on File Prior to Encounter   Medication Sig Dispense Refill    albuterol (PROVENTIL HFA, VENTOLIN HFA, PROAIR HFA) 90 mcg/actuation inhaler Take 2 Puffs by inhalation every four (4) hours as needed for Wheezing.  1 Inhaler 1    calcium acetate,phosphat bind, (PHOSLO) 667 mg cap as directed.  NIFEdipine ER (PROCARDIA XL) 60 mg ER tablet Take 1 Tablet by mouth daily. 90 Tablet 2    omeprazole (PRILOSEC) 40 mg capsule Take 40 mg by mouth two (2) times a day.  carvediloL (COREG) 25 mg tablet Take 25 mg by mouth two (2) times daily (with meals).  hydrALAZINE (APRESOLINE) 100 mg tablet Take 100 mg by mouth three (3) times daily.  furosemide (LASIX) 40 mg tablet Take 40 mg by mouth daily. Past History     Past Medical History:  Past Medical History:   Diagnosis Date    CAD (coronary artery disease)     MI (12 years ago)    Chronic kidney disease     ESRD x4 years HD Treatment    Hypertension        Past Surgical History:  Past Surgical History:   Procedure Laterality Date    HX ORTHOPAEDIC      left carpal tunnel     HX ROTATOR CUFF REPAIR         Family History:  Family History   Problem Relation Age of Onset    Hypertension Mother     Hypertension Father     Hypertension Maternal Aunt        Social History:  Social History     Tobacco Use    Smoking status: Former Smoker     Years: 15.00    Smokeless tobacco: Never Used    Tobacco comment: l   Vaping Use    Vaping Use: Never used   Substance Use Topics    Alcohol use: Not Currently     Alcohol/week: 1.0 - 2.0 standard drinks     Types: 1 - 2 Glasses of wine per week     Comment: social     Drug use: Never       Allergies: Allergies   Allergen Reactions    Bactrim [Sulfamethoxazole-Trimethoprim] Rash     Patient had a rash on his penis. Review of Systems   Review of Systems All other systems negative as reviewed. Constitutional: No fever  Eyes: No change in vision  ENT: No sore throat  CVS: No chest pain  Respiratory: +dyspnea, cough  GI: No nausea, vomiting  : No dysuria  Musculoskeletal: No extremity pain  Skin: No rash  Allergic/Immunologic: No adenopathy. No urticaria.   Heme/Lymph: No ankle swelling, no abnormal bruising  Endocrine: No heat/cold intolerance; no increased thirst.  Neuro: No headache  Psych: No depression, no hallucination. Review of Systems    Physical Exam   Physical Exam  GENERAL APPEARANCE: Vital signs reviewed, Patient appears comfortable, Alert and oriented X 3, Normal stature. HEAD: Atraumatic, Normocephalic. EYES: PERRLA, EOMI, No discharge from eyes, Sclera are normal, Conjunctiva are normal.  ENT: External auditory canals and tympanic membranes clear, hearing grossly intact. No nasal discharge. Oral cavity and pharynx normal. No swelling, exudate, or lesions. NECK: Normal ROM, No jugular venous distention, Non-tender without lymphadenopathy or masses. CARDIAC: Normal S1 and S2. No murmurs, gallops, or rubs. Rhythm is regular. LUNGS: Mild expiratory wheeze. Diminished aeration. Chest rise and fall symmetric. ABDOMEN: Soft, non-tender, non-distended. No rebound or guarding. No masses palpated. MUSKULOSKELETAL: No joint effusion, erythema, or tenderness. Normal muscular development. BACK: No significant scoliosis or kyphosis. No tenderness to palpation, No CVA tenderness  EXTREMITIES: No significant deformity. 2+ radial pulses. 2+ femoral pulses. No peripheral edema, cyanosis or clubbing. Extremities are warm. NEUROLOGICAL: CN II-XII intact. Strength and sensation symmetric and intact throughout. Normal speech. SKIN: Skin normal color, texture, and turgor with no lesions or eruptions. Heme/lymphatic/immunologic: No abnormal bruising, petechia, or adenopathy.   PSYCHIATRIC: The patient was able to demonstrate good judgement and reason, without hallucinations, abnormal affect or abnormal behaviors during the exam    Physical Exam    Lab and Diagnostic Study Results     Labs -     Recent Results (from the past 12 hour(s))   BLOOD GAS, ARTERIAL    Collection Time: 08/28/21  3:15 AM   Result Value Ref Range    pH 7.37 7.37 - 7.43      PCO2 53 (H) 35.0 - 45.0 mmHg    PO2 57 (L) 84 - 98 mmHg    O2 SAT 85 (L) 94 - 100 %    BICARBONATE 30 (H) 23.0 - 27.0 mmol/L    BASE EXCESS 5.1 (H) 0.0 - 2.5 mmol/L    O2 METHOD Nasal Cannula      O2 FLOW RATE 2 L/min    Sample source Arterial      SITE Right Radial      CALLUM'S TEST PASS      Critical value read back [facility] CALLED ER     Carboxy-Hgb 0.2 0 - 3 %    Methemoglobin 0.3 0 - 3 %    tHb 12 12.0 - 16.0 g/dL    Oxyhemoglobin 94 90 - 100 %   URINALYSIS W/ RFLX MICROSCOPIC    Collection Time: 08/28/21  3:15 AM   Result Value Ref Range    Color Yellow      Appearance Clear      Specific gravity 1.010 1.005 - 1.030      pH (UA) >8.5 (H) 5.0 - 8.0    Protein 300 (A) Negative mg/dL    Glucose 100 (A) Negative mg/dL    Ketone Negative Negative mg/dL    Bilirubin Negative Negative      Blood Negative Negative      Urobilinogen 0.2 0.2 - 1.0 EU/dL    Nitrites Negative Negative      Leukocyte Esterase Negative Negative     URINE MICROSCOPIC    Collection Time: 08/28/21  3:15 AM   Result Value Ref Range    WBC 0-4 0 - 4 /hpf    RBC 0-5 0 - 2 /hpf    Epithelial cells Few 0 - 20 /lpf    Bacteria Negative (A) None /hpf   CBC WITH AUTOMATED DIFF    Collection Time: 08/28/21  5:20 AM   Result Value Ref Range    WBC 6.4 4.6 - 13.2 K/uL    RBC 1.91 (L) 4.35 - 5.65 M/uL    HGB 5.3 (LL) 13.0 - 16.0 g/dL    HCT 17.9 (LL) 36.0 - 48.0 %    MCV 93.7 78.0 - 100.0 FL    MCH 27.7 24.0 - 34.0 PG    MCHC 29.6 (L) 31.0 - 37.0 g/dL    RDW 19.1 (H) 11.6 - 14.5 %    PLATELET 929 873 - 326 K/uL    MPV 11.1 9.2 - 11.8 FL    NRBC 0.0 0.0  WBC    ABSOLUTE NRBC 0.00 0.00 - 0.01 K/uL    NEUTROPHILS 80 (H) 40 - 73 %    LYMPHOCYTES 19 (L) 21 - 52 %    MONOCYTES 1 (L) 3 - 10 %    EOSINOPHILS 0 0 - 5 %    BASOPHILS 0 0 - 2 %    IMMATURE GRANULOCYTES 0 %    ABS. NEUTROPHILS 5.1 1.8 - 8.0 K/UL    ABS. LYMPHOCYTES 1.2 0.9 - 3.6 K/UL    ABS. MONOCYTES 0.1 0.05 - 1.2 K/UL    ABS. EOSINOPHILS 0.0 0.0 - 0.4 K/UL    ABS. BASOPHILS 0.0 0.0 - 0.1 K/UL    ABS. IMM.  GRANS. 0.0 K/UL    DF AUTOMATED      RBC COMMENTS Anisocytosis  1+        RBC COMMENTS Macrocytosis  1+        RBC COMMENTS Hypochromia  1+       METABOLIC PANEL, COMPREHENSIVE    Collection Time: 08/28/21  5:20 AM   Result Value Ref Range    Sodium 143 135 - 145 mmol/L    Potassium 5.5 (H) 3.2 - 5.1 mmol/L    Chloride 100 94 - 111 mmol/L    CO2 30 21 - 33 mmol/L    Anion gap 13 mmol/L    Glucose 93 70 - 110 mg/dL    BUN 61 (H) 9 - 21 mg/dL    Creatinine 11.40 (H) 0.8 - 1.50 mg/dL    BUN/Creatinine ratio 5      GFR est AA 6 ml/min/1.73m2    GFR est non-AA 5 ml/min/1.73m2    Calcium 8.9 8.5 - 10.5 mg/dL    Bilirubin, total 0.5 0.2 - 1.0 mg/dL    AST (SGOT) 18 17 - 74 U/L    ALT (SGPT) 10 3 - 72 U/L    Alk.  phosphatase 65 38 - 126 U/L    Protein, total 7.0 6.1 - 8.4 g/dL    Albumin 4.1 3.5 - 4.7 g/dL    Globulin 2.9 g/dL    A-G Ratio 1.4     LIPASE    Collection Time: 08/28/21  5:20 AM   Result Value Ref Range    Lipase 51 10 - 57 U/L   MAGNESIUM    Collection Time: 08/28/21  5:20 AM   Result Value Ref Range    Magnesium 2.1 1.7 - 2.8 mg/dL   TROPONIN I    Collection Time: 08/28/21  5:20 AM   Result Value Ref Range    Troponin-I, Qt. 0.11 (H) 0.02 - 0.05 ng/mL   BNP    Collection Time: 08/28/21  5:20 AM   Result Value Ref Range    BNP 2,050 (H) 0 - 100 pg/mL   TYPE & SCREEN    Collection Time: 08/28/21  5:20 AM   Result Value Ref Range    Crossmatch Expiration 08/31/2021,0356     ABO/Rh(D) AB Positive     Antibody screen Negative    OCCULT BLOOD, STOOL    Collection Time: 08/28/21  7:50 AM   Result Value Ref Range    Occult Blood,day 1 Positive      Day 1 date: 8,282,021     COVID-19 RAPID TEST    Collection Time: 08/28/21 10:00 AM   Result Value Ref Range    Specimen source Nasopharyngeal      COVID-19 rapid test Not Detected Not Detected         Radiologic Studies -   @[email or web address]@  CT Results  (Last 48 hours)    None        CXR Results  (Last 48 hours)               08/28/21 0356  XR CHEST PORT Final result    Impression:      Marked cardiomegaly without significant change. Patchy interstitial airspace   opacities are slightly improved compared to the prior examination suggesting   resolving edema or infection. * * ** *       Narrative:  EXAM: XR CHEST PORT       CLINICAL INDICATION/HISTORY: sob     > Additional: None. COMPARISON: August 10, 2021       TECHNIQUE: Portable chest       _______________       FINDINGS:       SUPPORT DEVICES: None. HEART AND MEDIASTINUM: The heart is markedly enlarged with a globular   morphology, unchanged. LUNGS AND PLEURAL SPACES: Patchy interstitial airspace opacities bilaterally are   slightly improved compared to the prior examination. BONY THORAX AND SOFT TISSUES: No acute osseous abnormality. _______________                   Medical Decision Making   - I am the first provider for this patient. - I reviewed the vital signs, available nursing notes, past medical history, past surgical history, family history and social history. - Initial assessment performed. The patients presenting problems have been discussed, and they are in agreement with the care plan formulated and outlined with them. I have encouraged them to ask questions as they arise throughout their visit. Vital Signs-Reviewed the patient's vital signs.   Patient Vitals for the past 12 hrs:   Temp Pulse Resp BP SpO2   08/28/21 1246  77 18 (!) 175/107 100 %   08/28/21 1206  82 17 (!) 185/108 98 %   08/28/21 1126  81 23 (!) 183/100 100 %   08/28/21 1036  88 24 (!) 185/104 100 %   08/28/21 1016     100 %   08/28/21 1005  89  (!) 181/107    08/28/21 1000  94  (!) 181/107 96 %   08/28/21 0704  81 20 (!) 174/101 96 %   08/28/21 0630  86 20 (!) 203/116 97 %   08/28/21 0604  82 20 (!) 193/109 96 %   08/28/21 0534  85 20 (!) 169/88 92 %   08/28/21 0454  79 22 (!) 153/91 100 %   08/28/21 0448     100 %   08/28/21 0444  83 22 (!) 154/97 99 %   08/28/21 0434  82 22 (!) 147/92 99 %   08/28/21 0424  85 20 (!) 146/88 97 %   08/28/21 0317     96 %   08/28/21 0314 98.6 °F (37 °C) 87 22 (!) 161/96 96 %   08/28/21 0306  94 22 (!) 164/98 96 %   08/28/21 0254  93 22 (!) 152/100 91 %       Records Reviewed: Nursing Notes, Old Medical Records and Ambulance Run Sheet    The patient presents with dyspnea with a differential diagnosis of CHF, COPD, pulmonary edema, hypoxic respiratory failure, COVID-19, fluid overload, ACS      ED Course/Provider Notes (Medical Decision Making):   35-year-old ESRD patient on dialysis T/Th/Sat presents to ER by EMS for shortness of breath. Due for dialysis this morning at 0500, but felt extremely dyspneic in the middle of the night. Much improved after breathing treatment. Work up is ordered - delayed due to failure of lab interface. At change of shift, case discussed with Dr. Ozzie Adames - pending lab work, disposition. MDM  Number of Diagnoses or Management Options     Amount and/or Complexity of Data Reviewed  Clinical lab tests: reviewed and ordered  Tests in the radiology section of CPT®: reviewed and ordered    Risk of Complications, Morbidity, and/or Mortality  Presenting problems: moderate  Diagnostic procedures: moderate  Management options: moderate  General comments: Received patient from Dr Dave. He states he has had black stool for 1 week. Hg has dropped from 10 to 5 since 8/13/2021. I performed a rectal exam and I am awaiting occult blood result. I will also initiate another attempt at  Nephrology consult. Apparently prior MD was unable to reach Nephrology. We are unable to reach them this morning and there is no GI available to address probable UGI bleed. Will transfer patient. 9:25 am Patient showing increased work of breathing. Rales present in the bases. Patient refused EJ and central line. So far, unable to secure a peripheral line. Will place on BiPAP and continue to work on securing venous access. No beds in most surrounding hospitals. Discussed with Brett Hernandez. We both agree patient needs attention to respiratory failure, transfusion, dialysis prior to GI intervention. Patient permitted left EJ catheter placement by me. Tolerated without difficulty. Discussed with Dr Otilia Arias, [address] who accepted to Tele/ICU     EKG performed at 9:45a read by me at 9:45 a. NSR rate 83, NSSTT changes, LVH, normal intervals. Abnormal EKG. Procedures   Medical Decision Makingedical Decision Making  Performed by: Elodia Sanford MD  PROCEDURES:  Procedures       Disposition   Disposition: Transferred to [facility] patient verbally agreed to transfer and understand the risks involved as outlined in the EMTALA form. Accepted by Dr Harmony Roger Tele/ICU        DISCHARGE PLAN:  1. Current Discharge Medication List      CONTINUE these medications which have NOT CHANGED    Details   albuterol (PROVENTIL HFA, VENTOLIN HFA, PROAIR HFA) 90 mcg/actuation inhaler Take 2 Puffs by inhalation every four (4) hours as needed for Wheezing. Qty: 1 Inhaler, Refills: 1    Associated Diagnoses: Acute pulmonary edema (HCC)      calcium acetate,phosphat bind, (PHOSLO) 667 mg cap as directed. NIFEdipine ER (PROCARDIA XL) 60 mg ER tablet Take 1 Tablet by mouth daily. Qty: 90 Tablet, Refills: 2    Associated Diagnoses: Essential hypertension      omeprazole (PRILOSEC) 40 mg capsule Take 40 mg by mouth two (2) times a day. carvediloL (COREG) 25 mg tablet Take 25 mg by mouth two (2) times daily (with meals). hydrALAZINE (APRESOLINE) 100 mg tablet Take 100 mg by mouth three (3) times daily. furosemide (LASIX) 40 mg tablet Take 40 mg by mouth daily. 2.   Follow-up Information    None       3. Return to ED if worse   4. Current Discharge Medication List            Diagnosis     Clinical Impression:   1. Symptomatic anemia    2. Hypoxia    3. End stage renal failure on dialysis ([address]    4. Upper GI bleed    5.  Essential hypertension 6. Acute respiratory failure with hypoxia (HCC)    7. Acute on chronic congestive heart failure, unspecified heart failure type ([facility] [address]        Attestations:    Yonathan Davies MD    Please note that this dictation was completed with [email or web address], the computer voice recognition software. Quite often unanticipated grammatical, syntax, homophones, and other interpretive errors are inadvertently transcribed by the computer software. Please disregard these errors. Please excuse any errors that have escaped final proofreading. Thank you.

## 2021-08-28 NOTE — PROGRESS NOTES
Results for Rahul Clarke (MRN [id number]) as of 8/28/2021 06:30   Ref.  Range 8/28/2021 03:15   pH Latest Ref Range: 7.37 - 7.43   7.37   PCO2 Latest Ref Range: 35.0 - 45.0 mmHg 53 (H)   PO2 Latest Ref Range: 84 - 98 mmHg 57 (L)   BICARBONATE Latest Ref Range: 23.0 - 27.0 mmol/L 30 (H)   O2 SAT Latest Ref Range: 94 - 100 % 85 (L)   BASE EXCESS Latest Ref Range: 0.0 - 2.5 mmol/L 5.1 (H)   tHb Latest Ref Range: 12.0 - 16.0 g/dL 12   Oxyhemoglobin Latest Ref Range: 90 - 100 % 94   Sample source Latest Units:   Arterial   SITE Latest Units:   Right Radial   CALLUM'S TEST Latest Units:   PASS   O2 FLOW RATE Latest Units: L/min 2   O2 METHOD Latest Units:   Nasal Cannula   Carboxy-Hgb Latest Ref Range: 0 - 3 % 0.2   Methemoglobin Latest Ref Range: 0 - 3 % 0.3

## 2021-08-28 NOTE — ED NOTES
Patient called EMS for shortness of breath , 02 sat at home 79 %, placed on 2L/NC by EMS ans sat increased 98% and patient felt relief. Patient due for dialysis this am. Fistula in left arm . Patient has dialysis on tu-th-sat.

## 2021-08-29 LAB
ANION GAP SERPL CALC-SCNC: 9 MMOL/L (ref 5–15)
ATRIAL RATE: 83 BPM
BUN SERPL-MCNC: 75 MG/DL (ref 6–20)
BUN/CREAT SERPL: 6 (ref 12–20)
CALCIUM SERPL-MCNC: 8.8 MG/DL (ref 8.5–10.1)
CALCULATED P AXIS, ECG09: 20 DEGREES
CALCULATED R AXIS, ECG10: -22 DEGREES
CALCULATED T AXIS, ECG11: 114 DEGREES
CHLORIDE SERPL-SCNC: 103 MMOL/L (ref 97–108)
CO2 SERPL-SCNC: 28 MMOL/L (ref 21–32)
CREAT SERPL-MCNC: 13.6 MG/DL (ref 0.7–1.3)
DIAGNOSIS, 93000: NORMAL
ERYTHROCYTE [DISTWIDTH] IN BLOOD BY AUTOMATED COUNT: 19.1 % (ref 11.5–14.5)
GLUCOSE SERPL-MCNC: 78 MG/DL (ref 65–100)
HBV SURFACE AG SER QL: <0.1 INDEX
HBV SURFACE AG SER QL: NEGATIVE
HCT VFR BLD AUTO: 17.8 % (ref 36.6–50.3)
HCT VFR BLD AUTO: 21.6 % (ref 36.6–50.3)
HGB BLD-MCNC: 5.2 G/DL (ref 12.1–17)
HGB BLD-MCNC: 6.9 G/DL (ref 12.1–17)
HISTORY CHECKED?,CKHIST: NORMAL
MCH RBC QN AUTO: 27.8 PG (ref 26–34)
MCHC RBC AUTO-ENTMCNC: 29.2 G/DL (ref 30–36.5)
MCV RBC AUTO: 95.2 FL (ref 80–99)
NRBC # BLD: 0 K/UL (ref 0–0.01)
NRBC BLD-RTO: 0 PER 100 WBC
P-R INTERVAL, ECG05: 173 MS
PLATELET # BLD AUTO: 159 K/UL (ref 150–400)
PMV BLD AUTO: 10.4 FL (ref 8.9–12.9)
POTASSIUM SERPL-SCNC: 5.9 MMOL/L (ref 3.5–5.1)
Q-T INTERVAL, ECG07: 362 MS
QRS DURATION, ECG06: 92 MS
QTC CALCULATION (BEZET), ECG08: 426 MS
RBC # BLD AUTO: 1.87 M/UL (ref 4.1–5.7)
SODIUM SERPL-SCNC: 140 MMOL/L (ref 136–145)
VENTRICULAR RATE, ECG03: 83 BPM
WBC # BLD AUTO: 5.7 K/UL (ref 4.1–11.1)

## 2021-08-29 PROCEDURE — 85027 COMPLETE CBC AUTOMATED: CPT

## 2021-08-29 PROCEDURE — 36415 COLL VENOUS BLD VENIPUNCTURE: CPT

## 2021-08-29 PROCEDURE — P9016 RBC LEUKOCYTES REDUCED: HCPCS

## 2021-08-29 PROCEDURE — 80048 BASIC METABOLIC PNL TOTAL CA: CPT

## 2021-08-29 PROCEDURE — 74011250637 HC RX REV CODE- 250/637: Performed by: INTERNAL MEDICINE

## 2021-08-29 PROCEDURE — 65660000000 HC RM CCU STEPDOWN

## 2021-08-29 PROCEDURE — C9113 INJ PANTOPRAZOLE SODIUM, VIA: HCPCS | Performed by: INTERNAL MEDICINE

## 2021-08-29 PROCEDURE — 87340 HEPATITIS B SURFACE AG IA: CPT

## 2021-08-29 PROCEDURE — 5A1D70Z PERFORMANCE OF URINARY FILTRATION, INTERMITTENT, LESS THAN 6 HOURS PER DAY: ICD-10-PCS | Performed by: INTERNAL MEDICINE

## 2021-08-29 PROCEDURE — 74011000250 HC RX REV CODE- 250: Performed by: INTERNAL MEDICINE

## 2021-08-29 PROCEDURE — 94640 AIRWAY INHALATION TREATMENT: CPT

## 2021-08-29 PROCEDURE — 74011250636 HC RX REV CODE- 250/636: Performed by: INTERNAL MEDICINE

## 2021-08-29 PROCEDURE — 93005 ELECTROCARDIOGRAM TRACING: CPT

## 2021-08-29 PROCEDURE — 85018 HEMOGLOBIN: CPT

## 2021-08-29 PROCEDURE — 90935 HEMODIALYSIS ONE EVALUATION: CPT

## 2021-08-29 PROCEDURE — 36430 TRANSFUSION BLD/BLD COMPNT: CPT

## 2021-08-29 RX ORDER — SODIUM CHLORIDE 9 MG/ML
250 INJECTION, SOLUTION INTRAVENOUS AS NEEDED
Status: DISCONTINUED | OUTPATIENT
Start: 2021-08-29 | End: 2021-09-01 | Stop reason: HOSPADM

## 2021-08-29 RX ORDER — FUROSEMIDE 10 MG/ML
80 INJECTION INTRAMUSCULAR; INTRAVENOUS ONCE
Status: COMPLETED | OUTPATIENT
Start: 2021-08-29 | End: 2021-08-29

## 2021-08-29 RX ORDER — SUCRALFATE 1 G/1
1 TABLET ORAL
Status: DISCONTINUED | OUTPATIENT
Start: 2021-08-29 | End: 2021-09-01 | Stop reason: HOSPADM

## 2021-08-29 RX ORDER — CARVEDILOL 12.5 MG/1
25 TABLET ORAL 2 TIMES DAILY WITH MEALS
Status: DISCONTINUED | OUTPATIENT
Start: 2021-08-29 | End: 2021-09-01 | Stop reason: HOSPADM

## 2021-08-29 RX ORDER — NIFEDIPINE 60 MG/1
60 TABLET, EXTENDED RELEASE ORAL DAILY
Status: DISCONTINUED | OUTPATIENT
Start: 2021-08-30 | End: 2021-08-29

## 2021-08-29 RX ORDER — NIFEDIPINE 60 MG/1
60 TABLET, EXTENDED RELEASE ORAL DAILY
Status: DISCONTINUED | OUTPATIENT
Start: 2021-08-29 | End: 2021-09-01

## 2021-08-29 RX ORDER — HYDRALAZINE HYDROCHLORIDE 25 MG/1
100 TABLET, FILM COATED ORAL 3 TIMES DAILY
Status: DISCONTINUED | OUTPATIENT
Start: 2021-08-29 | End: 2021-09-01 | Stop reason: HOSPADM

## 2021-08-29 RX ADMIN — PANTOPRAZOLE SODIUM 40 MG: 40 INJECTION, POWDER, FOR SOLUTION INTRAVENOUS at 13:14

## 2021-08-29 RX ADMIN — SUCRALFATE 1 G: 1 TABLET ORAL at 13:13

## 2021-08-29 RX ADMIN — SUCRALFATE 1 G: 1 TABLET ORAL at 06:26

## 2021-08-29 RX ADMIN — Medication 10 ML: at 06:26

## 2021-08-29 RX ADMIN — Medication 10 ML: at 21:15

## 2021-08-29 RX ADMIN — HYDRALAZINE HYDROCHLORIDE 100 MG: 25 TABLET, FILM COATED ORAL at 21:15

## 2021-08-29 RX ADMIN — NIFEDIPINE 60 MG: 60 TABLET, EXTENDED RELEASE ORAL at 13:33

## 2021-08-29 RX ADMIN — Medication 10 ML: at 13:34

## 2021-08-29 RX ADMIN — SUCRALFATE 1 G: 1 TABLET ORAL at 21:15

## 2021-08-29 RX ADMIN — IPRATROPIUM BROMIDE AND ALBUTEROL SULFATE 3 ML: .5; 3 SOLUTION RESPIRATORY (INHALATION) at 06:30

## 2021-08-29 RX ADMIN — FUROSEMIDE 80 MG: 10 INJECTION, SOLUTION INTRAMUSCULAR; INTRAVENOUS at 06:27

## 2021-08-29 RX ADMIN — HYDRALAZINE HYDROCHLORIDE 100 MG: 25 TABLET, FILM COATED ORAL at 16:00

## 2021-08-29 RX ADMIN — SUCRALFATE 1 G: 1 TABLET ORAL at 15:59

## 2021-08-29 RX ADMIN — CARVEDILOL 25 MG: 12.5 TABLET, FILM COATED ORAL at 17:03

## 2021-08-29 RX ADMIN — PANTOPRAZOLE SODIUM 40 MG: 40 INJECTION, POWDER, FOR SOLUTION INTRAVENOUS at 21:15

## 2021-08-29 NOTE — PROGRESS NOTES
Bedside and Verbal shift change report given to Dwayne Menon (oncoming nurse) by Yasmine James RN (offgoing nurse). Report included the following information SBAR, Kardex, ED Summary, Intake/Output, Recent Results, Med Rec Status and Cardiac Rhythm NSR/ST.     1925: Pt alert, oriented, denies pain. Pt resting quietly. 2045: sent Type and screen to [employer]. 2200: Blood bank verified blood is ready. 2339: Started PRBC's at 75 ml/hr. 0030: Pt ambulated to bathroom with one assist; after returning to bed, pt c/o chest pain. Spo2 @ 76%. Placed Non-rebreather on pt. Paused blood, flushed IV.     0037: Pt spo2 increased to 100%, HR at 97. Pt reported chest pain decreasing. Restarted blood at 125 hr.     0100: Notified Dr. Alis Espinal of pt status. No new orders at this time. Patient Vitals for the past 4 hrs:   Temp Pulse Resp BP SpO2   08/29/21 0048 97.9 °F (36.6 °C) 92 22 (!) 179/103 97 %   08/29/21 0030 97.9 °F (36.6 °C) (!) 102 14 (!) 191/104 (!) 76 %   08/29/21 0000 99.3 °F (37.4 °C) 89 16 (!) 173/94 94 %   08/28/21 2345 99.6 °F (37.6 °C) 93 21 (!) 176/98 96 %   08/28/21 2331 99.6 °F (37.6 °C) 88 22 (!) 174/97 97 %   08/28/21 2319 99.6 °F (37.6 °C) (!) 102 27 (!) 163/97 90 %   08/28/21 2316  92           0600: Pt alert, oriented, denies pain. Spo2 @ 99% with 3 L O2 per NC. Lung sounds course with crackles bilaterally. Notified by Lab of critical HGB 5.2. Notified Dr. Alis Espinal of pt status. Dr. Evans France Lasix 80 mg IV once. Dr. Evans France 1 unit PRBC's .  Recommended blood be given with dialysis if possible this morning. Bedside and Verbal shift change report given to Greg Castillo RN (oncoming nurse) by Dwayne Menon (offgoing nurse). Report included the following information SBAR, Kardex, ED Summary, Intake/Output, Recent Results, Med Rec Status and Cardiac Rhythm NSR.

## 2021-08-29 NOTE — PROGRESS NOTES
Bedside shift change report given to Miriam (oncoming nurse) by Ayesha Whittaker (offgoing nurse). Report included the following information SBAR, Kardex, ED Summary, Intake/Output, MAR and Cardiac Rhythm Normal sinus to Sinus Tach. Bedside shift change report given to Joselin Woodard (oncoming nurse) by Miriam (offgoing nurse).  Report included the following information SBAR, Kardex, ED Summary, Intake/Output, MAR and Cardiac Rhythm NS to ST.

## 2021-08-29 NOTE — PROGRESS NOTES
Link Husain M.D.  [phone]           GI PROGRESS NOTE        NAME: Roxane Miranda   :  1975   MRN:  [id number]       Subjective:   Offers no complaints to me      Objective:         VITALS:   Last 24hrs VS reviewed since prior progress note. Most recent are:  Visit Vitals  BP (!) 165/94   Pulse 78   Temp 99.1 °F (37.3 °C)   Resp 15   SpO2 99%       Intake/Output Summary (Last 24 hours) at 2021 1054  Last data filed at 2021 1030  Gross per 24 hour   Intake 681.3 ml   Output 100 ml   Net 581.3 ml       PHYSICAL EXAM:  General: Alert, in no acute distress    HEENT: Anicteric sclerae. Lungs:            CTA Bilaterally. Heart:  Regular  rhythm,    Abdomen: Soft, Non distended, Non tender.  (+)Bowel sounds, no HSM  Extremities: No c/c/e  Neurologic:  CN 2-12 gi, Alert and oriented X 3. No acute neurological distress   Psych:   Good insight. Not anxious nor agitated.     Lab Data Reviewed:   Recent Labs     21  0445 21  0520   WBC 5.7 6.4   HGB 5.2* 5.3*   HCT 17.8* 17.9*    180     Recent Labs     21  0445 21  0520    143   K 5.9* 5.5*    100   CO2 28 30   BUN 75* 61*   CREA 13.60* 11.40*   GLU 78 93   CA 8.8 8.9     Recent Labs     21  0520   AP 65   TP 7.0   ALB 4.1   GLOB 2.9   LPSE 51       ________________________________________________________________________  Patient Active Problem List   Diagnosis Code    ESRD (end stage renal disease) on dialysis (HCC) N18.6, Z99.2    Anemia D64.9    HTN (hypertension) I10    Acute hyperkalemia E87.5    Diarrhea R19.7    Infection of scalp L08.9    Benign neoplasm of scalp and skin of neck D23.4    Hyperkalemia E87.5    Bronchospasm J98.01    Pulmonary edema J81.1    Hypertension I10    Acute blood loss anemia D62         Assessment and Plan:  He is asymptomatic however did not have an appropriate response to transfusion  Recommend transfusing to get hemoglobin greater than 7 g/dL  Please keep him n.p.o. after midnight for potential upper endoscopy tomorrow       Signed By: Angie Plascencia MD     8/29/2021  10:54 AM

## 2021-08-29 NOTE — CONSULTS
Gastroenterology Consultation Note      Admit Date: 8/28/2021  Consult Date: 8/29/2021   I greatly appreciate your asking me to see Zakiya Leon, thank you very much for the opportunity to participate in his care. Narrative Assessment and Plan   · Acute blood loss anemia  · Melena  · History of PUD  · NSAID use  · ESRD on HD  · Hypertension    Symptomatic anemia with prior melena and hgb 5. Getting transfused. Hemodynamically stable. History of PUD    - NPO for now  - follow CBC after transfusion  - IV BID PPI  - PO QID carafate  - HD as per renal with transfusions  - will need EGD this admission. Timing to depend on response to transfusions  - no NSAIDs, anticoagulants    Subjective:     Chief Complaint: Fatigue    History of Present Illness: 39 YOM with ERSD on HD who presents for weakness, fatigue, dyspnea. He has history of PUD with similar presentation in 10/2020. Had EGD at [facility] at that time with antral ulcer. Biopsies without dysplasia, IM or h pylori. Was put on omeprazole and compliant but still using significant NSAIDs. Reports no nausea, vomiting, hematemesis. He says that 3-4 days ago was having melena. Two bowel movements today which is decreased in frequency. Hgb 5 in ER.      PCP:  Panfilo Clayton MD    Past Medical History:   Diagnosis Date    CAD (coronary artery disease)     MI (12 years ago)    Chronic kidney disease     ESRD x4 years HD Treatment    Hypertension         Past Surgical History:   Procedure Laterality Date    HX ORTHOPAEDIC      left carpal tunnel     HX ROTATOR CUFF REPAIR         Social History     Tobacco Use    Smoking status: Former Smoker     Years: 15.00    Smokeless tobacco: Never Used    Tobacco comment: l   Substance Use Topics    Alcohol use: Not Currently     Alcohol/week: 1.0 - 2.0 standard drinks     Types: 1 - 2 Glasses of wine per week     Comment: social         Family History   Problem Relation Age of Onset    Hypertension Mother     Hypertension Father     Hypertension Maternal Aunt         Allergies   Allergen Reactions    Bactrim [Sulfamethoxazole-Trimethoprim] Rash     Patient had a rash on his penis. Home Medications:  Prior to Admission Medications   Prescriptions Last Dose Informant Patient Reported? Taking? NIFEdipine ER (PROCARDIA XL) 60 mg ER tablet   No No   Sig: Take 1 Tablet by mouth daily. albuterol (PROVENTIL HFA, VENTOLIN HFA, PROAIR HFA) 90 mcg/actuation inhaler   No No   Sig: Take 2 Puffs by inhalation every four (4) hours as needed for Wheezing. calcium acetate,phosphat bind, (PHOSLO) 667 mg cap   Yes No   Sig: as directed. carvediloL (COREG) 25 mg tablet   Yes No   Sig: Take 25 mg by mouth two (2) times daily (with meals). furosemide (LASIX) 40 mg tablet   Yes No   Sig: Take 40 mg by mouth daily. hydrALAZINE (APRESOLINE) 100 mg tablet   Yes No   Sig: Take 100 mg by mouth three (3) times daily. omeprazole (PRILOSEC) 40 mg capsule   Yes No   Sig: Take 40 mg by mouth two (2) times a day.       Facility-Administered Medications: None       Hospital Medications:  Current Facility-Administered Medications   Medication Dose Route Frequency    sucralfate (CARAFATE) tablet 1 g  1 g Oral AC&HS    0.9% sodium chloride infusion 250 mL  250 mL IntraVENous PRN    sodium chloride (NS) flush 5-40 mL  5-40 mL IntraVENous Q8H    sodium chloride (NS) flush 5-40 mL  5-40 mL IntraVENous PRN    acetaminophen (TYLENOL) tablet 650 mg  650 mg Oral Q6H PRN    Or    acetaminophen (TYLENOL) suppository 650 mg  650 mg Rectal Q6H PRN    polyethylene glycol (MIRALAX) packet 17 g  17 g Oral DAILY PRN    ondansetron (ZOFRAN ODT) tablet 4 mg  4 mg Oral Q8H PRN    Or    ondansetron (ZOFRAN) injection 4 mg  4 mg IntraVENous Q6H PRN    pantoprazole (PROTONIX) 40 mg in 0.9% sodium chloride 10 mL injection  40 mg IntraVENous Q12H    albuterol-ipratropium (DUO-NEB) 2.5 MG-0.5 MG/3 ML  3 mL Nebulization Q6H PRN    hydrALAZINE (APRESOLINE) 20 mg/mL injection 20 mg  20 mg IntraVENous Q6H PRN       Review of Systems: Admission ROS by Usman Crow MD from 8/28/2021 were reviewed with the patient and changes (other than per HPI) include: Negative except per HPI  none      Objective:     Physical Exam:  Visit Vitals  BP (!) 173/94 (BP 1 Location: Right upper arm, BP Patient Position: At rest;Supine)   Pulse 89   Temp 99.3 °F (37.4 °C)   Resp 16   SpO2 94%     SpO2 Readings from Last 6 Encounters:   08/29/21 94%   08/28/21 100%   08/20/21 98%   08/13/21 95%   07/07/21 93%   06/17/21 93%    O2 Flow Rate (L/min): 2 l/min       Intake/Output Summary (Last 24 hours) at 8/29/2021 0037  Last data filed at 8/28/2021 2356  Gross per 24 hour   Intake    Output 100 ml   Net -100 ml        General: no distress, comfortable  Skin:  No rash or palpable dermatologic mass lesions  HEENT: Pupils equal, sclera anicteric, oropharynx with no gross lesions  Cardiovascular: No abnormal audible heart sounds, well perfused, no edema  Respiratory:  No abnormal audible breath sounds, normal respiratory effort, no throacic deformity  GI:  soft, Abdomen nondistended, nontender, no mass, no free fluid, no rebound or guarding. Musculoskeletal:  No skeletal deformity nor acute arthritis noted.   Neurological:  Motor and sensory function intact in upper extremeties  Psychiatric:  Normal affect, memory intact, appears to have insight into current illness  Lymphatic:  No cervical, supraclavicular, or periumbilic lymphadenopathy    Laboratory:    Recent Results (from the past 24 hour(s))   BLOOD GAS, ARTERIAL    Collection Time: 08/28/21  3:15 AM   Result Value Ref Range    pH 7.37 7.37 - 7.43      PCO2 53 (H) 35.0 - 45.0 mmHg    PO2 57 (L) 84 - 98 mmHg    O2 SAT 85 (L) 94 - 100 %    BICARBONATE 30 (H) 23.0 - 27.0 mmol/L    BASE EXCESS 5.1 (H) 0.0 - 2.5 mmol/L    O2 METHOD Nasal Cannula      O2 FLOW RATE 2 L/min    Sample source Arterial      SITE Right Radial      CALLUM'S TEST PASS      Critical value read back [facility] CALLED ER     Carboxy-Hgb 0.2 0 - 3 %    Methemoglobin 0.3 0 - 3 %    tHb 12 12.0 - 16.0 g/dL    Oxyhemoglobin 94 90 - 100 %   URINALYSIS W/ RFLX MICROSCOPIC    Collection Time: 08/28/21  3:15 AM   Result Value Ref Range    Color Yellow      Appearance Clear      Specific gravity 1.010 1.005 - 1.030      pH (UA) >8.5 (H) 5.0 - 8.0    Protein 300 (A) Negative mg/dL    Glucose 100 (A) Negative mg/dL    Ketone Negative Negative mg/dL    Bilirubin Negative Negative      Blood Negative Negative      Urobilinogen 0.2 0.2 - 1.0 EU/dL    Nitrites Negative Negative      Leukocyte Esterase Negative Negative     URINE MICROSCOPIC    Collection Time: 08/28/21  3:15 AM   Result Value Ref Range    WBC 0-4 0 - 4 /hpf    RBC 0-5 0 - 2 /hpf    Epithelial cells Few 0 - 20 /lpf    Bacteria Negative (A) None /hpf   CBC WITH AUTOMATED DIFF    Collection Time: 08/28/21  5:20 AM   Result Value Ref Range    WBC 6.4 4.6 - 13.2 K/uL    RBC 1.91 (L) 4.35 - 5.65 M/uL    HGB 5.3 (LL) 13.0 - 16.0 g/dL    HCT 17.9 (LL) 36.0 - 48.0 %    MCV 93.7 78.0 - 100.0 FL    MCH 27.7 24.0 - 34.0 PG    MCHC 29.6 (L) 31.0 - 37.0 g/dL    RDW 19.1 (H) 11.6 - 14.5 %    PLATELET 762 488 - 297 K/uL    MPV 11.1 9.2 - 11.8 FL    NRBC 0.0 0.0  WBC    ABSOLUTE NRBC 0.00 0.00 - 0.01 K/uL    NEUTROPHILS 80 (H) 40 - 73 %    LYMPHOCYTES 19 (L) 21 - 52 %    MONOCYTES 1 (L) 3 - 10 %    EOSINOPHILS 0 0 - 5 %    BASOPHILS 0 0 - 2 %    IMMATURE GRANULOCYTES 0 %    ABS. NEUTROPHILS 5.1 1.8 - 8.0 K/UL    ABS. LYMPHOCYTES 1.2 0.9 - 3.6 K/UL    ABS. MONOCYTES 0.1 0.05 - 1.2 K/UL    ABS. EOSINOPHILS 0.0 0.0 - 0.4 K/UL    ABS. BASOPHILS 0.0 0.0 - 0.1 K/UL    ABS. IMM.  GRANS. 0.0 K/UL    DF AUTOMATED      RBC COMMENTS Anisocytosis  1+        RBC COMMENTS Macrocytosis  1+        RBC COMMENTS Hypochromia  1+       METABOLIC PANEL, COMPREHENSIVE    Collection Time: 08/28/21  5:20 AM   Result Value Ref Range Sodium 143 135 - 145 mmol/L    Potassium 5.5 (H) 3.2 - 5.1 mmol/L    Chloride 100 94 - 111 mmol/L    CO2 30 21 - 33 mmol/L    Anion gap 13 mmol/L    Glucose 93 70 - 110 mg/dL    BUN 61 (H) 9 - 21 mg/dL    Creatinine 11.40 (H) 0.8 - 1.50 mg/dL    BUN/Creatinine ratio 5      GFR est AA 6 ml/min/1.73m2    GFR est non-AA 5 ml/min/1.73m2    Calcium 8.9 8.5 - 10.5 mg/dL    Bilirubin, total 0.5 0.2 - 1.0 mg/dL    AST (SGOT) 18 17 - 74 U/L    ALT (SGPT) 10 3 - 72 U/L    Alk.  phosphatase 65 38 - 126 U/L    Protein, total 7.0 6.1 - 8.4 g/dL    Albumin 4.1 3.5 - 4.7 g/dL    Globulin 2.9 g/dL    A-G Ratio 1.4     LIPASE    Collection Time: 08/28/21  5:20 AM   Result Value Ref Range    Lipase 51 10 - 57 U/L   MAGNESIUM    Collection Time: 08/28/21  5:20 AM   Result Value Ref Range    Magnesium 2.1 1.7 - 2.8 mg/dL   TROPONIN I    Collection Time: 08/28/21  5:20 AM   Result Value Ref Range    Troponin-I, Qt. 0.11 (H) 0.02 - 0.05 ng/mL   BNP    Collection Time: 08/28/21  5:20 AM   Result Value Ref Range    BNP 2,050 (H) 0 - 100 pg/mL   TYPE & SCREEN    Collection Time: 08/28/21  5:20 AM   Result Value Ref Range    Crossmatch Expiration 08/31/2021,2359     ABO/Rh(D) AB Positive     Antibody screen Negative    OCCULT BLOOD, STOOL    Collection Time: 08/28/21  7:50 AM   Result Value Ref Range    Occult Blood,day 1 Positive      Day 1 date: 8,282,021     EKG, 12 LEAD, INITIAL    Collection Time: 08/28/21  9:45 AM   Result Value Ref Range    Ventricular Rate 83 BPM    Atrial Rate 83 BPM    P-R Interval 173 ms    QRS Duration 92 ms    Q-T Interval 362 ms    QTC Calculation (Bezet) 426 ms    Calculated P Axis 20 degrees    Calculated R Axis -22 degrees    Calculated T Axis 114 degrees    Diagnosis       Sinus rhythm  LVH with secondary repolarization abnormality  Baseline wander in lead(s) V1,V5     COVID-19 RAPID TEST    Collection Time: 08/28/21 10:00 AM   Result Value Ref Range    Specimen source Nasopharyngeal      COVID-19 rapid test Not Detected Not Detected     RBC, ALLOCATE    Collection Time: 08/28/21  6:15 PM   Result Value Ref Range    HISTORY CHECKED? Historical check performed    TYPE & SCREEN    Collection Time: 08/28/21  6:50 PM   Result Value Ref Range    Crossmatch Expiration 08/31/2021,4705     ABO/Rh(D) AB POSITIVE     Antibody screen NEG     Unit number [id number]     Blood component type RC LR     Unit division 00     Status of unit ISSUED     Crossmatch result Compatible          Assessment/Plan:     Active Problems:    Acute blood loss anemia (8/28/2021)         See above narrative for full detail.

## 2021-08-29 NOTE — PROGRESS NOTES
[facility]  [phone]    Vitals   Pre   Post   Assessment   Pre   Post     Temp  Temp: 99.5 °F (37.5 °C) (08/29/21 0822)  98.7 LOC  Alert and oriented    Alert and oriented   HR   Pulse (Heart Rate): 84 (08/29/21 0822) 85 Lungs    labored with shortness of breath diminished Even unlabored at rest   B/P   BP: (!) 164/98 (08/29/21 0822) 170/96 Cardiac   regular bedside telemetry  Bedside telemetry    Resp   Resp Rate: 20 (08/29/21 0822) 21 Skin   warm and dry   Warm and dry    Pain level  Pain Intensity 1: 0 (08/29/21 0754) No verbal complaints at this time Edema  Trace      trace   Orders:    Duration:   Start:    5145 End:    1224 Total:   4   Dialyzer:   Dialyzer/Set Up Inspection: Revaclear (08/29/21 0822)   Nupur [place] Bath:   Dialysate K (mEq/L): 2 (08/29/21 0048)   Ca Bath:   Dialysate CA (mEq/L): 2.5 (08/29/21 9889)   Na/Bicarb:   Dialysate NA (mEq/L): 140 (08/29/21 0822)   Target Fluid Removal:   Goal/Amount of Fluid to Remove (mL): 2500 mL (08/29/21 0822)   Access     Type & Location:   ALEJANDRA AV fistula, no redness or drainage, + thrill/bruit, prepped with alcohol followed by chlorhexidine cannulated with two 15 gauge needles, secured with tape, and flushed easily    Labs     Obtained/Reviewed   Critical Results Called   Date when labs were drawn-  Hgb-    HGB   Date Value Ref Range Status   08/29/2021 5.2 (LL) 12.1 - 17.0 g/dL Corrected     Comment:     RESULTS VERIFIED, PHONED TO AND READ BACK BY  CHEYENNE MARTEL RN AT [facility]/6533  CORRECTED ON 08/29 AT 0720: PREVIOUSLY REPORTED AS 5.2 RESULTS VERIFIED, PHONED TO AND READ BACK BY CHEYENNE MARTEL RN AT 3217       K-    Potassium   Date Value Ref Range Status   08/29/2021 5.9 (H) 3.5 - 5.1 mmol/L Final     Ca-   Calcium   Date Value Ref Range Status   08/29/2021 8.8 8.5 - 10.1 MG/DL Final     Bun-   BUN   Date Value Ref Range Status   08/29/2021 75 (H) 6 - 20 MG/DL Final     Creat-   Creatinine   Date Value Ref Range Status 08/29/2021 13.60 (H) 0.70 - 1.30 MG/DL Final        Medications/ Blood Products Given     Name   Dose   Route and Time     PRBC One unit Iv @ 0915   PRBC One unit IV at 1045        Blood Volume Processed (BVP):    80 Net Fluid   Removed:  2500   Comments   Time Out Done: 3319  Primary Nurse Rpt Pre: Kanchan Yo RN   Primary Nurse Rpt Post:OMERO Fink RN  Pt Education: procedural to include blood transfusion on HD   Care Plan: continue current HD plan of care   Tx Summary:  [phone] HD initiated as ordered  [phone] Dr. Lee  in to see patient on dialysis  1224 treatment completed all possible blood returned, hemostasis achieved <10 minutes, dressing clean, dry and intact, + thrill. All dialysis related medications have been reviewed.     Admiting Diagnosis: blood loss  Pt's previous clinic- [facility]   Consent signed - Informed Consent Verified: Yes (08/29/21 0822)  Hepatitis Status- 26844/19 immune physician portal, antigen negative 08/12/21  Machine #- Machine Number: [id number] (08/29/21 8082)  Telemetry status- bedside

## 2021-08-29 NOTE — PROGRESS NOTES
8/29/21  2:36 PM    CM reviewed EMR and met with patient in room to complete the initial evaluation. Confirmed charted demographics. Patient had a recent hospital stay at [facility] from 8/10-8/13/21    Reason for Readmission:     Acute blood loss- Anemia         RUR Score/Risk Level:   19% Moderate      PCP: First and Last name:  Wendee Phoenix   Name of Practice: [address] [place] [address]   Are you a current patient: Yes/No: Yes   Approximate date of last visit: \"Last week\"   Can you participate in a virtual visit with your PCP:     Is a Care Conference indicated:   Not at this time    Did you attend your follow up appointment (s): If not, why not:  Yes       Resources/supports as identified by patient/family:  Lives alone but has family        Top Challenges facing patient (as identified by patient/family and CM): Finances/Medication cost?  Patient has Medicare and Medicaid as a secondary but reports issues with medication affordability, CM encouraged patient to call the pharmacy to ensure they have both insurances listed. One medication was over $1,000 and he was not able to pick it up. Patient assured CM he will call to check and make sure they are running the correct insurance. Transportation  Patient drives himself      Support system or lack thereof? See above  Living arrangements? Resides in a ground level apartment      Self-care/ADLs/Cognition? Alert and oriented, able to complete ADL's        Current Advanced Directive/Advance Care Plan:  Full Code           Plan for utilizing home health:   Has not used home health in the past             Transition of Care Plan:    Based on readmission, the patient's previous Plan of Care   has been evaluated and/or modified. The current Transition of Care Plan is:           1). Patient admitted for medical management  2). Goes to - T/TH/Sat at [facility]  3). Family will transport at [place]  4).  CM will follow for dc needs    [address] Management Interventions  PCP Verified by CM: Yes (attend an appointment with in the last week)  Mode of Transport at Discharge:  Other (see comment) (Family will provide transport)  Transition of Care Consult (CM Consult): Discharge Planning  Discharge Durable Medical Equipment: No  Physical Therapy Consult: No  Occupational Therapy Consult: No  Speech Therapy Consult: No  Current Support Network: Lives Alone  Confirm Follow Up Transport: Self  Discharge Location  Discharge Placement: Home with outpatient services     Readmission Assessment  Number of days since last admission?: 8-30 days  Previous disposition: Home Alone  Who is being interviewed?: Patient  What was the patient's/caregiver's perception as to why they think they needed to return back to the hospital?: Other (Comment)  Did you visit your Primary Care Physician after you left the hospital, before you returned this time?: Yes  Did you see a specialist, such as Cardiac, Pulmonary, Orthopedic Physician, etc. after you left the hospital?: No  Who advised the patient to return to the hospital?: Self-referral  Does the patient report anything that got in the way of taking their medications?: Yes  What reasons did they give?: Inadequate medication insurance  In our efforts to provide the best possible care to you and others like you, can you think of anything that we could have done to help you after you left the hospital the first time, so that you might not have needed to return so soon?: Other (Comment) (Medical concerns)

## 2021-08-29 NOTE — PROGRESS NOTES
Bedside shift change report given to [facility] (oncoming nurse) by Govind Dorado (offgoing nurse). Report included the following information SBAR, Kardex, Intake/Output, MAR, Recent Results and Cardiac Rhythm NSR/ST. Bedside shift change report given to Laci Jorge (oncoming nurse) by [facility] (offgoing nurse). Report included the following information SBAR, Kardex, Intake/Output, MAR, Recent Results and Cardiac Rhythm NSR.

## 2021-08-29 NOTE — PROGRESS NOTES
[facility]  [address] [facility], [place], [address] [place] [address]  [phone]      Medical Progress Note      NAME: Matteo Falcon   :  1975  MRM:  [id number]    Date/Time: 2021  12:56 PM           Assessment / Plan:     #ABLA 2/ UGIB: Likely gastric ulcer in s/o copious NSAID use. Presently hemodynamically stable to volume overloaded. He agrees to transfusion.               - IV PPI BID              - Transfuse 2u pRBC this morning, 1 last night without good response; monitor for fluid overload, HD today              - No further NSAIDs              - Ensure IV access              - Tele, serial H/H              - NPO at midnight for EGD     #AHRF: Mild here requiring 2L nc, but had briefly been on BiPAP at OSH. He is vaccinated against covid and had a negative rapid test at OSH. Favor pulmonary edema having missed HD today. - Appreciate renal following for HD                #HTN: High here in s/o missed HD. On nifedipine 60mg, carved 25mg, hydral 100mg TID, furosemide 40mg as outpt              - Hold lasix, restart others now     #CAD: No chest pain at present              - carvedilol     #L hip pain: Has led to him taking excess NSAIDs. Favor OA              - L hip plain film                     Care Plan discussed with: Patient, Nursing Staff and Consultant/Specialist    Discussed:  Care Plan    Prophylaxis:  SCD's    Disposition:  Home w/Family           ___________________________________________________    Attending Physician: August Bernard MD        Subjective:     Chief Complaint:  Anita Mattes, Hb did not increase much with 1u pRBC. He otherwise feels ok without complaints. Black BM this morning    ROS:  (bold if positive, if negative)              Objective:       Vitals:          Last 24hrs VS reviewed since prior progress note.  Most recent are:    Visit Vitals  BP (!) 170/96   Pulse 85   Temp 98.7 °F (37.1 °C) (Oral)   Resp 21   SpO2 100%     SpO2 Readings from Last 6 Encounters:   08/29/21 100%   08/28/21 100%   08/20/21 98%   08/13/21 95%   07/07/21 93%   06/17/21 93%    O2 Flow Rate (L/min): 2 l/min       Intake/Output Summary (Last 24 hours) at 8/29/2021 1256  Last data filed at 8/29/2021 1224  Gross per 24 hour   Intake 1000.1 ml   Output 2600 ml   Net -1599.9 ml          Exam:     Physical Exam:    Gen:  Well-developed, well-nourished, in no acute distress  HEENT:  Pink conjunctivae, PERRL, hearing intact to voice, moist mucous membranes  Neck:  Supple, without masses, thyroid non-tender  Resp:  No accessory muscle use, clear breath sounds without wheezes rales or rhonchi  Card:  No murmurs, normal S1, S2 without thrills, bruits or peripheral edema  Abd:  Soft, non-tender, non-distended, normoactive bowel sounds are present  Musc:  No cyanosis or clubbing  Skin:  No rashes or ulcers, skin turgor is good  Neuro:  Cranial nerves 3-12 are grossly intact,  strength is 5/5 bilaterally and dorsi / plantarflexion is 5/5 bilaterally, follows commands appropriately  Psych:  Good insight, oriented to person, place and time, alert       Medications Reviewed: (see below)    Lab Data Reviewed: (see below)    ______________________________________________________________________    Medications:     Current Facility-Administered Medications   Medication Dose Route Frequency    sucralfate (CARAFATE) tablet 1 g  1 g Oral AC&HS    0.9% sodium chloride infusion 250 mL  250 mL IntraVENous PRN    0.9% sodium chloride infusion 250 mL  250 mL IntraVENous PRN    carvediloL (COREG) tablet 25 mg  25 mg Oral BID WITH MEALS    hydrALAZINE (APRESOLINE) tablet 100 mg  100 mg Oral TID    NIFEdipine ER (PROCARDIA XL) tablet 60 mg  60 mg Oral DAILY    0.9% sodium chloride infusion 250 mL  250 mL IntraVENous PRN    sodium chloride (NS) flush 5-40 mL  5-40 mL IntraVENous Q8H    sodium chloride (NS) flush 5-40 mL  5-40 mL IntraVENous PRN    acetaminophen (TYLENOL) tablet 650 mg  650 mg Oral Q6H PRN    Or    acetaminophen (TYLENOL) suppository 650 mg  650 mg Rectal Q6H PRN    polyethylene glycol (MIRALAX) packet 17 g  17 g Oral DAILY PRN    ondansetron (ZOFRAN ODT) tablet 4 mg  4 mg Oral Q8H PRN    Or    ondansetron (ZOFRAN) injection 4 mg  4 mg IntraVENous Q6H PRN    pantoprazole (PROTONIX) 40 mg in 0.9% sodium chloride 10 mL injection  40 mg IntraVENous Q12H    albuterol-ipratropium (DUO-NEB) 2.5 MG-0.5 MG/3 ML  3 mL Nebulization Q6H PRN    hydrALAZINE (APRESOLINE) 20 mg/mL injection 20 mg  20 mg IntraVENous Q6H PRN            Lab Review:     Recent Labs     08/29/21  0445 08/28/21  0520   WBC 5.7 6.4   HGB 5.2* 5.3*   HCT 17.8* 17.9*    180     Recent Labs     08/29/21  0445 08/28/21  0520    143   K 5.9* 5.5*    100   CO2 28 30   GLU 78 93   BUN 75* 61*   CREA 13.60* 11.40*   CA 8.8 8.9   MG  --  2.1   ALB  --  4.1   ALT  --  10     No components found for: [place]

## 2021-08-30 ENCOUNTER — ANESTHESIA (OUTPATIENT)
Dept: ENDOSCOPY | Age: 46
DRG: 377 | End: 2021-08-30
Payer: MEDICARE

## 2021-08-30 ENCOUNTER — ANESTHESIA EVENT (OUTPATIENT)
Dept: ENDOSCOPY | Age: 46
DRG: 377 | End: 2021-08-30
Payer: MEDICARE

## 2021-08-30 ENCOUNTER — APPOINTMENT (OUTPATIENT)
Dept: GENERAL RADIOLOGY | Age: 46
DRG: 377 | End: 2021-08-30
Attending: INTERNAL MEDICINE
Payer: MEDICARE

## 2021-08-30 LAB
ABO + RH BLD: NORMAL
ALBUMIN SERPL-MCNC: 3.3 G/DL (ref 3.5–5)
ALBUMIN/GLOB SERPL: 1.1 {RATIO} (ref 1.1–2.2)
ALP SERPL-CCNC: 74 U/L (ref 45–117)
ALT SERPL-CCNC: 11 U/L (ref 12–78)
ANION GAP SERPL CALC-SCNC: 6 MMOL/L (ref 5–15)
AST SERPL-CCNC: 9 U/L (ref 15–37)
BASOPHILS # BLD: 0 K/UL (ref 0–0.1)
BASOPHILS NFR BLD: 1 % (ref 0–1)
BILIRUB SERPL-MCNC: 0.6 MG/DL (ref 0.2–1)
BLD PROD TYP BPU: NORMAL
BLOOD GROUP ANTIBODIES SERPL: NORMAL
BPU ID: NORMAL
BUN SERPL-MCNC: 38 MG/DL (ref 6–20)
BUN/CREAT SERPL: 4 (ref 12–20)
CALCIUM SERPL-MCNC: 8 MG/DL (ref 8.5–10.1)
CHLORIDE SERPL-SCNC: 102 MMOL/L (ref 97–108)
CO2 SERPL-SCNC: 31 MMOL/L (ref 21–32)
COVID-19 RAPID TEST, COVR: NOT DETECTED
CREAT SERPL-MCNC: 9 MG/DL (ref 0.7–1.3)
CROSSMATCH RESULT,%XM: NORMAL
DIFFERENTIAL METHOD BLD: ABNORMAL
EOSINOPHIL # BLD: 0 K/UL (ref 0–0.4)
EOSINOPHIL NFR BLD: 1 % (ref 0–7)
ERYTHROCYTE [DISTWIDTH] IN BLOOD BY AUTOMATED COUNT: 17.6 % (ref 11.5–14.5)
GLOBULIN SER CALC-MCNC: 3 G/DL (ref 2–4)
GLUCOSE SERPL-MCNC: 83 MG/DL (ref 65–100)
HCT VFR BLD AUTO: 25 % (ref 36.6–50.3)
HGB BLD-MCNC: 7.9 G/DL (ref 12.1–17)
IMM GRANULOCYTES # BLD AUTO: 0 K/UL (ref 0–0.04)
IMM GRANULOCYTES NFR BLD AUTO: 0 % (ref 0–0.5)
LYMPHOCYTES # BLD: 0.7 K/UL (ref 0.8–3.5)
LYMPHOCYTES NFR BLD: 16 % (ref 12–49)
MCH RBC QN AUTO: 28.4 PG (ref 26–34)
MCHC RBC AUTO-ENTMCNC: 31.6 G/DL (ref 30–36.5)
MCV RBC AUTO: 89.9 FL (ref 80–99)
MONOCYTES # BLD: 0.4 K/UL (ref 0–1)
MONOCYTES NFR BLD: 8 % (ref 5–13)
NEUTS SEG # BLD: 3.3 K/UL (ref 1.8–8)
NEUTS SEG NFR BLD: 74 % (ref 32–75)
NRBC # BLD: 0 K/UL (ref 0–0.01)
NRBC BLD-RTO: 0 PER 100 WBC
PLATELET # BLD AUTO: 160 K/UL (ref 150–400)
PMV BLD AUTO: 10.8 FL (ref 8.9–12.9)
POTASSIUM SERPL-SCNC: 4.7 MMOL/L (ref 3.5–5.1)
PROT SERPL-MCNC: 6.3 G/DL (ref 6.4–8.2)
RBC # BLD AUTO: 2.78 M/UL (ref 4.1–5.7)
RBC MORPH BLD: ABNORMAL
SODIUM SERPL-SCNC: 139 MMOL/L (ref 136–145)
SOURCE, COVRS: NORMAL
SPECIMEN EXP DATE BLD: NORMAL
STATUS OF UNIT,%ST: NORMAL
UNIT DIVISION, %UDIV: 0
WBC # BLD AUTO: 4.4 K/UL (ref 4.1–11.1)

## 2021-08-30 PROCEDURE — 77010033678 HC OXYGEN DAILY

## 2021-08-30 PROCEDURE — 74011250637 HC RX REV CODE- 250/637: Performed by: INTERNAL MEDICINE

## 2021-08-30 PROCEDURE — C9113 INJ PANTOPRAZOLE SODIUM, VIA: HCPCS | Performed by: INTERNAL MEDICINE

## 2021-08-30 PROCEDURE — 74011250636 HC RX REV CODE- 250/636: Performed by: INTERNAL MEDICINE

## 2021-08-30 PROCEDURE — 76060000031 HC ANESTHESIA FIRST 0.5 HR: Performed by: INTERNAL MEDICINE

## 2021-08-30 PROCEDURE — 73502 X-RAY EXAM HIP UNI 2-3 VIEWS: CPT

## 2021-08-30 PROCEDURE — 74011000250 HC RX REV CODE- 250: Performed by: NURSE ANESTHETIST, CERTIFIED REGISTERED

## 2021-08-30 PROCEDURE — 76040000019: Performed by: INTERNAL MEDICINE

## 2021-08-30 PROCEDURE — 85025 COMPLETE CBC W/AUTO DIFF WBC: CPT

## 2021-08-30 PROCEDURE — 65660000000 HC RM CCU STEPDOWN

## 2021-08-30 PROCEDURE — 80053 COMPREHEN METABOLIC PANEL: CPT

## 2021-08-30 PROCEDURE — 87635 SARS-COV-2 COVID-19 AMP PRB: CPT

## 2021-08-30 PROCEDURE — 0DB78ZX EXCISION OF STOMACH, PYLORUS, VIA NATURAL OR ARTIFICIAL OPENING ENDOSCOPIC, DIAGNOSTIC: ICD-10-PCS | Performed by: INTERNAL MEDICINE

## 2021-08-30 PROCEDURE — 74011250636 HC RX REV CODE- 250/636: Performed by: NURSE ANESTHETIST, CERTIFIED REGISTERED

## 2021-08-30 PROCEDURE — 65270000029 HC RM PRIVATE

## 2021-08-30 PROCEDURE — 2709999900 HC NON-CHARGEABLE SUPPLY: Performed by: INTERNAL MEDICINE

## 2021-08-30 PROCEDURE — 36415 COLL VENOUS BLD VENIPUNCTURE: CPT

## 2021-08-30 PROCEDURE — 77030021593 HC FCPS BIOP ENDOSC BSC -A: Performed by: INTERNAL MEDICINE

## 2021-08-30 PROCEDURE — 88305 TISSUE EXAM BY PATHOLOGIST: CPT

## 2021-08-30 RX ORDER — LIDOCAINE HYDROCHLORIDE 20 MG/ML
INJECTION, SOLUTION EPIDURAL; INFILTRATION; INTRACAUDAL; PERINEURAL AS NEEDED
Status: DISCONTINUED | OUTPATIENT
Start: 2021-08-30 | End: 2021-08-30 | Stop reason: HOSPADM

## 2021-08-30 RX ORDER — DEXTROMETHORPHAN/PSEUDOEPHED 2.5-7.5/.8
1.2 DROPS ORAL
Status: DISCONTINUED | OUTPATIENT
Start: 2021-08-30 | End: 2021-08-30 | Stop reason: HOSPADM

## 2021-08-30 RX ORDER — FLUMAZENIL 0.1 MG/ML
0.2 INJECTION INTRAVENOUS
Status: DISCONTINUED | OUTPATIENT
Start: 2021-08-30 | End: 2021-08-30 | Stop reason: HOSPADM

## 2021-08-30 RX ORDER — EPINEPHRINE 0.1 MG/ML
1 INJECTION INTRACARDIAC; INTRAVENOUS
Status: DISCONTINUED | OUTPATIENT
Start: 2021-08-30 | End: 2021-08-30 | Stop reason: HOSPADM

## 2021-08-30 RX ORDER — FUROSEMIDE 40 MG/1
40 TABLET ORAL DAILY
Status: DISCONTINUED | OUTPATIENT
Start: 2021-08-31 | End: 2021-09-01 | Stop reason: HOSPADM

## 2021-08-30 RX ORDER — MIDAZOLAM HYDROCHLORIDE 1 MG/ML
.25-5 INJECTION, SOLUTION INTRAMUSCULAR; INTRAVENOUS
Status: DISCONTINUED | OUTPATIENT
Start: 2021-08-30 | End: 2021-08-30 | Stop reason: HOSPADM

## 2021-08-30 RX ORDER — PROPOFOL 10 MG/ML
INJECTION, EMULSION INTRAVENOUS AS NEEDED
Status: DISCONTINUED | OUTPATIENT
Start: 2021-08-30 | End: 2021-08-30 | Stop reason: HOSPADM

## 2021-08-30 RX ORDER — POLYETHYLENE GLYCOL 3350 17 G/17G
17 POWDER, FOR SOLUTION ORAL
Status: COMPLETED | OUTPATIENT
Start: 2021-08-30 | End: 2021-08-30

## 2021-08-30 RX ORDER — SODIUM CHLORIDE 9 MG/ML
50 INJECTION, SOLUTION INTRAVENOUS CONTINUOUS
Status: DISPENSED | OUTPATIENT
Start: 2021-08-30 | End: 2021-08-30

## 2021-08-30 RX ORDER — NALOXONE HYDROCHLORIDE 0.4 MG/ML
0.4 INJECTION, SOLUTION INTRAMUSCULAR; INTRAVENOUS; SUBCUTANEOUS
Status: DISCONTINUED | OUTPATIENT
Start: 2021-08-30 | End: 2021-08-30 | Stop reason: HOSPADM

## 2021-08-30 RX ORDER — ATROPINE SULFATE 0.1 MG/ML
0.4 INJECTION INTRAVENOUS
Status: DISCONTINUED | OUTPATIENT
Start: 2021-08-30 | End: 2021-08-30 | Stop reason: HOSPADM

## 2021-08-30 RX ORDER — POLYETHYLENE GLYCOL 3350 17 G/17G
17 POWDER, FOR SOLUTION ORAL
Status: COMPLETED | OUTPATIENT
Start: 2021-08-31 | End: 2021-08-31

## 2021-08-30 RX ADMIN — POLYETHYLENE GLYCOL 3350 17 G: 17 POWDER, FOR SOLUTION ORAL at 16:34

## 2021-08-30 RX ADMIN — CARVEDILOL 25 MG: 12.5 TABLET, FILM COATED ORAL at 16:34

## 2021-08-30 RX ADMIN — PROPOFOL INJECTABLE EMULSION 50 MG: 10 INJECTION, EMULSION INTRAVENOUS at 13:05

## 2021-08-30 RX ADMIN — POLYETHYLENE GLYCOL 3350 17 G: 17 POWDER, FOR SOLUTION ORAL at 16:35

## 2021-08-30 RX ADMIN — Medication 10 ML: at 14:25

## 2021-08-30 RX ADMIN — CARVEDILOL 25 MG: 12.5 TABLET, FILM COATED ORAL at 08:03

## 2021-08-30 RX ADMIN — PANTOPRAZOLE SODIUM 40 MG: 40 INJECTION, POWDER, FOR SOLUTION INTRAVENOUS at 08:04

## 2021-08-30 RX ADMIN — POLYETHYLENE GLYCOL 3350 17 G: 17 POWDER, FOR SOLUTION ORAL at 14:25

## 2021-08-30 RX ADMIN — PANTOPRAZOLE SODIUM 40 MG: 40 INJECTION, POWDER, FOR SOLUTION INTRAVENOUS at 21:00

## 2021-08-30 RX ADMIN — POLYETHYLENE GLYCOL 3350 17 G: 17 POWDER, FOR SOLUTION ORAL at 14:00

## 2021-08-30 RX ADMIN — SODIUM CHLORIDE 50 ML/HR: 9 INJECTION, SOLUTION INTRAVENOUS at 12:21

## 2021-08-30 RX ADMIN — SUCRALFATE 1 G: 1 TABLET ORAL at 14:25

## 2021-08-30 RX ADMIN — HYDRALAZINE HYDROCHLORIDE 100 MG: 25 TABLET, FILM COATED ORAL at 15:40

## 2021-08-30 RX ADMIN — NIFEDIPINE 60 MG: 60 TABLET, EXTENDED RELEASE ORAL at 08:03

## 2021-08-30 RX ADMIN — PROPOFOL INJECTABLE EMULSION 100 MG: 10 INJECTION, EMULSION INTRAVENOUS at 13:06

## 2021-08-30 RX ADMIN — POLYETHYLENE GLYCOL 3350 17 G: 17 POWDER, FOR SOLUTION ORAL at 15:41

## 2021-08-30 RX ADMIN — SUCRALFATE 1 G: 1 TABLET ORAL at 21:00

## 2021-08-30 RX ADMIN — HYDRALAZINE HYDROCHLORIDE 100 MG: 25 TABLET, FILM COATED ORAL at 08:03

## 2021-08-30 RX ADMIN — POLYETHYLENE GLYCOL 3350 17 G: 17 POWDER, FOR SOLUTION ORAL at 16:33

## 2021-08-30 RX ADMIN — LIDOCAINE HYDROCHLORIDE 50 MG: 20 INJECTION, SOLUTION INTRAVENOUS at 13:01

## 2021-08-30 RX ADMIN — ACETAMINOPHEN 650 MG: 325 TABLET ORAL at 03:02

## 2021-08-30 RX ADMIN — Medication 10 ML: at 21:00

## 2021-08-30 RX ADMIN — POLYETHYLENE GLYCOL 3350 17 G: 17 POWDER, FOR SOLUTION ORAL at 16:36

## 2021-08-30 RX ADMIN — POLYETHYLENE GLYCOL 3350 17 G: 17 POWDER, FOR SOLUTION ORAL at 15:19

## 2021-08-30 RX ADMIN — PROPOFOL INJECTABLE EMULSION 150 MG: 10 INJECTION, EMULSION INTRAVENOUS at 13:02

## 2021-08-30 RX ADMIN — HYDRALAZINE HYDROCHLORIDE 100 MG: 25 TABLET, FILM COATED ORAL at 21:00

## 2021-08-30 RX ADMIN — SUCRALFATE 1 G: 1 TABLET ORAL at 16:35

## 2021-08-30 NOTE — ANESTHESIA POSTPROCEDURE EVALUATION
Procedure(s):  ESOPHAGOGASTRODUODENOSCOPY (EGD)  ESOPHAGOGASTRODUODENAL (EGD) BIOPSY. MAC    Anesthesia Post Evaluation      Multimodal analgesia: multimodal analgesia not used between 6 hours prior to anesthesia start to PACU discharge  Patient location during evaluation: PACU  Patient participation: complete - patient participated  Level of consciousness: awake  Pain management: adequate  Airway patency: patent  Anesthetic complications: no  Cardiovascular status: acceptable, blood pressure returned to baseline and hemodynamically stable  Respiratory status: acceptable  Hydration status: acceptable  Post anesthesia nausea and vomiting:  controlled      INITIAL Post-op Vital signs:   Vitals Value Taken Time   /69 08/30/21 1321   Temp 36.7 °C (98 °F) 08/30/21 1321   Pulse 77 08/30/21 1323   Resp 20 08/30/21 1323   SpO2 96 % 08/30/21 1323   Vitals shown include unvalidated device data.

## 2021-08-30 NOTE — PROGRESS NOTES
Problem: Fluid Volume - Risk of, Imbalanced  Goal: *Balanced intake and output  Outcome: Progressing Towards Goal     Problem: Patient Education: Go to Patient Education Activity  Goal: Patient/Family Education  Outcome: Progressing Towards Goal

## 2021-08-30 NOTE — PERIOP NOTES
TRANSFER - OUT REPORT:    Verbal report given to [facility], RN (name) on Duyen Mena  being transferred to [facility] (unit) for routine post - op       Report consisted of patients Situation, Background, Assessment and   Recommendations(SBAR). Information from the following report(s) Procedure Summary and Recent Results was reviewed with the receiving nurse. Lines:   Peripheral IV 08/28/21 Anterior; Left External jugular (Active)   Site Assessment Clean, dry, & intact 08/30/21 1217   Phlebitis Assessment 0 08/30/21 1217   Infiltration Assessment 0 08/30/21 1217   Dressing Status Clean, dry, & intact 08/30/21 1217   Dressing Type Tape;Transparent 08/30/21 1217   Hub Color/Line Status Pink; Infusing 08/30/21 1217   Action Taken Open ports on tubing capped 08/30/21 1217   Alcohol Cap Used Yes 08/30/21 1217        Opportunity for questions and clarification was provided.       Patient transported with:   Tech, O2 @2L Denies drug use/caffeine

## 2021-08-30 NOTE — PERIOP NOTES
[facility]  1975  [id number]    Situation:    Scheduled Procedure: Procedure(s):  ESOPHAGOGASTRODUODENOSCOPY (EGD)  Verbal report received from: Nicholas Vaughn RN  Preoperative diagnosis: anemia    Background:    Procedure: Procedure(s):  ESOPHAGOGASTRODUODENOSCOPY (EGD)  Physician performing procedure; Dr. Jorie Kawasaki RN    NPO Status/Last PO Intake: NPO after MN      Is the patient taking Blood Thinners: NO   Is the patient diabetic:no       Does the patient have a Pacemaker/Defibrillator in place?: no   Does the patient need antibiotics before/during/after procedure: no   Is patient on CONTACT precautions:no          Assessment:  Are the vital signs stable prior to patient coming to ENDO?  yes  Is the patient alert/oriented and able to sign consent for the procedures:yes  How does the patient's abdomen feel prior to coming to ENDO? round and soft yes   Does the patient have a patient IV in place? Yes, left EJ     Recommendation:  Family or Friend present no     Permission to share finding with Family or Friend unknown, will assess.

## 2021-08-30 NOTE — PROGRESS NOTES
Bedside shift change report given to Miriam (oncoming nurse) by Baltazar Johansen (offgoing nurse). Report included the following information SBAR, Kardex, ED Summary, MAR, Recent Results and Cardiac Rhythm NSR.       0855 Pt went down to Xray    1126 Report given to [facility] for EGD. TRANSFER - OUT REPORT:    Verbal report given to Dorcas(name) on Ronda Pacheco  being transferred to 4th floor(unit) for routine progression of care       Report consisted of patients Situation, Background, Assessment and   Recommendations(SBAR). Information from the following report(s) SBAR, ED Summary, Procedure Summary, Intake/Output, MAR, Accordion and Cardiac Rhythm NSR was reviewed with the receiving nurse. Lines:   Peripheral IV 08/28/21 Anterior; Left External jugular (Active)   Site Assessment Clean, dry, & intact 08/30/21 1502   Phlebitis Assessment 0 08/30/21 1502   Infiltration Assessment 0 08/30/21 1502   Dressing Status Clean, dry, & intact 08/30/21 1502   Dressing Type Transparent;Tape 08/30/21 1502   Hub Color/Line Status Pink;Patent 08/30/21 1502   Action Taken Open ports on tubing capped 08/30/21 1502   Alcohol Cap Used Yes 08/30/21 1502        Opportunity for questions and clarification was provided.       Patient transported with:   O2 @ 2  liters

## 2021-08-30 NOTE — ANESTHESIA PREPROCEDURE EVALUATION
Relevant Problems   CARDIOVASCULAR   (+) HTN (hypertension)   (+) Hypertension      RENAL FAILURE   (+) ESRD (end stage renal disease) on dialysis (HCC)      HEMATOLOGY   (+) Acute blood loss anemia   (+) Anemia       Anesthetic History   No history of anesthetic complications            Review of Systems / Medical History  Patient summary reviewed, nursing notes reviewed and pertinent labs reviewed    Pulmonary  Within defined limits                 Neuro/Psych   Within defined limits           Cardiovascular    Hypertension: well controlled          CAD    Exercise tolerance: >4 METS     GI/Hepatic/Renal         Renal disease: ESRD and dialysis       Endo/Other        Obesity and anemia     Other Findings              Physical Exam    Airway  Mallampati: I  TM Distance: < 4 cm  Neck ROM: normal range of motion   Mouth opening: Normal     Cardiovascular    Rhythm: regular  Rate: normal         Dental    Dentition: Lower dentition intact and Upper dentition intact     Pulmonary  Breath sounds clear to auscultation               Abdominal  Abdominal exam normal       Other Findings            Anesthetic Plan    ASA: 3  Anesthesia type: MAC          Induction: Intravenous  Anesthetic plan and risks discussed with: Patient

## 2021-08-30 NOTE — PROGRESS NOTES
Bedside shift change report given to [facility] (oncoming nurse) by Stephan Bundy (offgoing nurse). Report included the following information SBAR, Kardex, ED Summary, Intake/Output, MAR, Recent Results and Cardiac Rhythm NSR.     [facility]. Per Amber, may give scheduled PO meds. 1200 Pt was taken to ENDO for EGD.    1400 Pt returned from ENDO.

## 2021-08-30 NOTE — PROGRESS NOTES
This patient was assisted with Intentional Toileting every 2 hours during this shift as appropriate. Documentation of ambulation and output reflected on Flowsheet as appropriate. Purposeful hourly rounding was completed using AIDET and 5Ps. Outcomes of PHR documented as they occurred. Bed alarm in use as appropriate. Dual Suction and ambubag in place.     LM

## 2021-08-30 NOTE — PROCEDURES
Jeevan Mccullough M.D.  [phone]           2021                EGD Operative Report  Ramirez Connolly  :  1975  [facility] Medical Record Number:  [id number]      Indication:  Iron deficiency anemia, Melena/hematochezia     : Sujey Muse MD    Referring Provider:  Lynda Roman MD      Anesthesia/Sedation:  MAC anesthesia    Airway assessment: No airway problems anticipated    Pre-Procedural Exam:      Airway: clear, no airway problems anticipated  Heart: RRR, without gallops or rubs  Lungs: clear bilaterally without wheezes, crackles, or rhonchi  Abdomen: soft, nontender, nondistended, bowel sounds present  Mental Status: awake, alert and oriented to person, place and time       Procedure Details     After infomed consent was obtained for the procedure, with all risks and benefits of procedure explained the patient was taken to the endoscopy suite and placed in the left lateral decubitus position. Following sequential administration of sedation as per above, the endoscope was inserted into the mouth and advanced under direct vision to second portion of the duodenum. A careful inspection was made as the gastroscope was withdrawn, including a retroflexed view of the proximal stomach; findings and interventions are described below. Findings:   Esophagus:normal  Stomach: Evidence of erythema and edema noted in the pre-pyloric antrum over one fold with a couple of small superficial erosions. No ulcer or bleeding seen. No mass lesion seen. Remaining mucosa appeared within normal.  Duodenum/jejunum: normal    Therapies:  none    Specimens: Antrum           Complications:   None; patient tolerated the procedure well. EBL:  None. Impression:    Acute NSAIDs induced gastropathy seen, however no active bleeding or old blood seen.         Recommendations:    -Continue acid suppression.  -Will proceed with colonoscopy tomorrow  -Avoid NSAIDs    Aj Craig MD

## 2021-08-30 NOTE — PROGRESS NOTES
[facility]  [address] [facility], [place], [address] [place] [address]  [phone]      Medical Progress Note      NAME: Sammy Klein   :  1975  MRM:  [id number]    Date/Time: 2021         Assessment / Plan:     #ABLA 2/2 UGIB: Likely gastric ulcer in s/o copious NSAID use. Has required 4u pRBCs to get Hb > 7. Now stable. EGD today with NSAID induced gastropathy.               - IV PPI BID to PO tmr              - Monitor H/H one more night to ensure no further transfusion needs              - No further NSAIDs              - Ensure IV access     #AHRF: Mild here requiring 2L nc, but had briefly been on BiPAP at OSH. He is vaccinated against covid and had a negative rapid test at OSH. Favor pulmonary edema having missed HD on admit. - Appreciate renal following for HD   - HD tmr, then home                #HTN: High here in s/o missed HD. On nifedipine 60mg, carved 25mg, hydral 100mg TID, furosemide 40mg as outpt              - Can resume all now     #CAD: No chest pain at present              - carvedilol     #L hip pain: Has led to him taking excess NSAIDs. Plain film unrevealing, bursitis? Care Plan discussed with: Patient, Nursing Staff and Consultant/Specialist    Discussed:  Care Plan    Prophylaxis:  SCD's    Disposition:  Home w/Family           ___________________________________________________    Attending Physician: Maame Willoughby MD        Subjective:     Chief Complaint:  Rakel , feels well without complaints    ROS:  (bold if positive, if negative)              Objective:       Vitals:          Last 24hrs VS reviewed since prior progress note.  Most recent are:    Visit Vitals  /65   Pulse 76   Temp 98 °F (36.7 °C)   Resp 16   Wt 94.1 kg (207 lb 6.4 oz)   SpO2 97%   BMI 27.36 kg/m²     SpO2 Readings from Last 6 Encounters:   21 97%   21 100%   21 98%   21 95%   21 93%   21 93%    O2 Flow Rate (L/min): 2 l/min       Intake/Output Summary (Last 24 hours) at 8/30/2021 1356  Last data filed at 8/29/2021 2120  Gross per 24 hour   Intake 569.2 ml   Output 125 ml   Net 444.2 ml          Exam:     Physical Exam:    Gen:  Well-developed, well-nourished, in no acute distress  HEENT:  Pink conjunctivae, PERRL, hearing intact to voice, moist mucous membranes  Neck:  Supple, without masses, thyroid non-tender  Resp:  No accessory muscle use, clear breath sounds without wheezes rales or rhonchi  Card:  No murmurs, normal S1, S2 without thrills, bruits or peripheral edema  Abd:  Soft, non-tender, non-distended, normoactive bowel sounds are present  Musc:  No cyanosis or clubbing  Skin:  No rashes or ulcers, skin turgor is good  Neuro:  Cranial nerves 3-12 are grossly intact,  strength is 5/5 bilaterally and dorsi / plantarflexion is 5/5 bilaterally, follows commands appropriately  Psych:  Good insight, oriented to person, place and time, alert       Medications Reviewed: (see below)    Lab Data Reviewed: (see below)    ______________________________________________________________________    Medications:     Current Facility-Administered Medications   Medication Dose Route Frequency    0.9% sodium chloride infusion  50 mL/hr IntraVENous CONTINUOUS    midazolam (VERSED) injection 0.25-5 mg  0.25-5 mg IntraVENous Multiple    naloxone (NARCAN) injection 0.4 mg  0.4 mg IntraVENous Multiple    flumazeniL (ROMAZICON) 0.1 mg/mL injection 0.2 mg  0.2 mg IntraVENous Multiple    simethicone (MYLICON) 22JH/5.6NS oral drops 80 mg  1.2 mL Oral Multiple    atropine injection 0.4 mg  0.4 mg IntraVENous ONCE PRN    EPINEPHrine (ADRENALIN) 0.1 mg/mL syringe 1 mg  1 mg Endoscopically ONCE PRN    polyethylene glycol (MIRALAX) packet 17 g  17 g Oral Q30MIN    [START ON 8/31/2021] polyethylene glycol (MIRALAX) packet 17 g  17 g Oral Q30MIN    sucralfate (CARAFATE) tablet 1 g  1 g Oral AC&HS    0.9% sodium chloride infusion 250 mL  250 mL IntraVENous PRN    0.9% sodium chloride infusion 250 mL  250 mL IntraVENous PRN    carvediloL (COREG) tablet 25 mg  25 mg Oral BID WITH MEALS    hydrALAZINE (APRESOLINE) tablet 100 mg  100 mg Oral TID    NIFEdipine ER (PROCARDIA XL) tablet 60 mg  60 mg Oral DAILY    0.9% sodium chloride infusion 250 mL  250 mL IntraVENous PRN    0.9% sodium chloride infusion 250 mL  250 mL IntraVENous PRN    sodium chloride (NS) flush 5-40 mL  5-40 mL IntraVENous Q8H    sodium chloride (NS) flush 5-40 mL  5-40 mL IntraVENous PRN    acetaminophen (TYLENOL) tablet 650 mg  650 mg Oral Q6H PRN    Or    acetaminophen (TYLENOL) suppository 650 mg  650 mg Rectal Q6H PRN    polyethylene glycol (MIRALAX) packet 17 g  17 g Oral DAILY PRN    ondansetron (ZOFRAN ODT) tablet 4 mg  4 mg Oral Q8H PRN    Or    ondansetron (ZOFRAN) injection 4 mg  4 mg IntraVENous Q6H PRN    pantoprazole (PROTONIX) 40 mg in 0.9% sodium chloride 10 mL injection  40 mg IntraVENous Q12H    albuterol-ipratropium (DUO-NEB) 2.5 MG-0.5 MG/3 ML  3 mL Nebulization Q6H PRN    hydrALAZINE (APRESOLINE) 20 mg/mL injection 20 mg  20 mg IntraVENous Q6H PRN            Lab Review:     Recent Labs     08/30/21  0257 08/29/21  1320 08/29/21  0445 08/28/21  0520 08/28/21  0520   WBC 4.4  --  5.7  --  6.4   HGB 7.9* 6.9* 5.2*   < > 5.3*   HCT 25.0* 21.6* 17.8*   < > 17.9*     --  159  --  180    < > = values in this interval not displayed.      Recent Labs     08/30/21  0257 08/29/21  0445 08/28/21  0520    140 143   K 4.7 5.9* 5.5*    103 100   CO2 31 28 30   GLU 83 78 93   BUN 38* 75* 61*   CREA 9.00* 13.60* 11.40*   CA 8.0* 8.8 8.9   MG  --   --  2.1   ALB 3.3*  --  4.1   ALT 11*  --  10     No components found for: [place]

## 2021-08-30 NOTE — PROGRESS NOTES
Care Management follow up    Patient admitted for acute blood loss, acute gastritis. History of: HTN, CAD, end stage renal disease with HD TThSat at [facility] in [place]. COVID Vaccine:  yes    RUR 24 (Score %) moderate   Is This a Readmission NO  Is this a Bundle NO    Current status  Patient continues to require medical management including IV fluids, IV protonix, nebs, and ongoing assessment and monitoring. Possible DC tomorrow. Home wth family. Transition of Care Plan  1. Monitor patient status and response to treatment. 2. Patient continues to require medical management. 3. Home with family at DC with outpatient follow up. 4. Transport home per family at DC. 5. CM to monitor progress and recommendations.     Beto Rahman, RN, MSN/Care manager

## 2021-08-30 NOTE — PERIOP NOTES
Report from [facility], CRNA, see anesthesia record. ABD remains soft and non-tender post procedure. Pt has no complaints at this time and tolerated the procedure well.     Endoscope was pre-cleaned at bedside immediately following procedure by Lizbet.

## 2021-08-30 NOTE — PROGRESS NOTES
Physician Progress Note      PATIENT:               Donavon Edwards  CSN #:                  [id number]  :                       1975  ADMIT DATE:       2021 6:04 PM  [address] DATE:  RESPONDING  PROVIDER #:        Jolayne Schaumann MD          QUERY TEXT:    Paramjit Neumann Afternoon, Dr. Cory Box    This patient admitted for  GI bleed. The patient  has documented known ESRD, due to HTN. The ER provider has documented A/C CHF. There is no documentation in the H&P of CHF. If possible, can you please clarify this dropped diagnosis, and please document in the progress notes and discharge summay    The medical record reflects the following:  Risk Factors: ESRD , HTN,  Clinical Indicators: BNP on admission , CXR on  \"Marked cardiomegaly without sign. change, Patchy interstitial airspace opacities are slightly improved compared to the prior exam suggesting resolving edema or infection. H&P notes,\"Favor pulmonary edema having missed HD today. Treatment: Renal consulted for HD, CXR, received x2 doses of IV Lasix , o2 supplement    Thank you  Marysol Hatch, [address] [place] [address], [address] [place] [address], CCDS, SMART  [phone]  Options provided:  -- Non-cardiogenic acute pulmonary edema from CXR on  due to missed dialysis only. CHF ruled out.  -- CHF confirmed  -- CHF is only a history of confirmed after study  -- Other - I will add my own diagnosis  -- Disagree - Not applicable / Not valid  -- Disagree - Clinically unable to determine / Unknown  -- Refer to Clinical Documentation Reviewer    PROVIDER RESPONSE TEXT:    This patient has non-cardiogenic acute pulmonary edema noted on CXR on  due to missed dialysis. CHF has been ruled out.     Query created by: Jackson Davis on 2021 3:45 PM      Electronically signed by:  Jolayne Schaumann MD 2021 4:20 PM

## 2021-08-30 NOTE — PROGRESS NOTES
[address]  1975  [id number]    Situation:  Verbal report received from: Laila DOBBINS   Procedure: Procedure(s):  ESOPHAGOGASTRODUODENOSCOPY (EGD)  ESOPHAGOGASTRODUODENAL (EGD) BIOPSY    Background:    Preoperative diagnosis: anemia  Postoperative diagnosis: Gastritis      :  Dr. Hillary Arana  Assistant(s): Endoscopy RN-1: Kennedy Chung RN  Endoscopy RN-2: Severo Morales RN    Specimens:   ID Type Source Tests Collected by Time Destination   1 : Antrum Biopsies Preservative   Annette Way MD 8/30/2021 1309 Pathology     H. Pylori  no    Assessment:    Anesthesia gave intra-procedure sedation and medications, see anesthesia flow sheet no    Intravenous fluids: NS@ KVO     Vital signs stable     Abdominal assessment: round and soft     Recommendation:  Discharge patient per MD order.   Return to floor  Family or Friend   Permission to share finding with family or friend yes and n/a

## 2021-08-31 ENCOUNTER — ANESTHESIA EVENT (OUTPATIENT)
Dept: ENDOSCOPY | Age: 46
DRG: 377 | End: 2021-08-31
Payer: MEDICARE

## 2021-08-31 ENCOUNTER — ANESTHESIA (OUTPATIENT)
Dept: ENDOSCOPY | Age: 46
DRG: 377 | End: 2021-08-31
Payer: MEDICARE

## 2021-08-31 LAB
ATRIAL RATE: 79 BPM
CALCULATED P AXIS, ECG09: 45 DEGREES
CALCULATED R AXIS, ECG10: -28 DEGREES
CALCULATED T AXIS, ECG11: 92 DEGREES
COMMENT, HOLDF: NORMAL
DIAGNOSIS, 93000: NORMAL
HCT VFR BLD AUTO: 24.7 % (ref 36.6–50.3)
HGB BLD-MCNC: 7.7 G/DL (ref 12.1–17)
P-R INTERVAL, ECG05: 168 MS
Q-T INTERVAL, ECG07: 368 MS
QRS DURATION, ECG06: 88 MS
QTC CALCULATION (BEZET), ECG08: 421 MS
SAMPLES BEING HELD,HOLD: NORMAL
VENTRICULAR RATE, ECG03: 79 BPM

## 2021-08-31 PROCEDURE — 65660000000 HC RM CCU STEPDOWN

## 2021-08-31 PROCEDURE — 74011250637 HC RX REV CODE- 250/637: Performed by: INTERNAL MEDICINE

## 2021-08-31 PROCEDURE — 85018 HEMOGLOBIN: CPT

## 2021-08-31 PROCEDURE — 90935 HEMODIALYSIS ONE EVALUATION: CPT

## 2021-08-31 RX ORDER — PANTOPRAZOLE SODIUM 40 MG/1
40 TABLET, DELAYED RELEASE ORAL 2 TIMES DAILY
Status: DISCONTINUED | OUTPATIENT
Start: 2021-08-31 | End: 2021-09-01 | Stop reason: HOSPADM

## 2021-08-31 RX ORDER — PANTOPRAZOLE SODIUM 40 MG/1
40 TABLET, DELAYED RELEASE ORAL 2 TIMES DAILY
Qty: 112 TABLET | Refills: 0 | Status: SHIPPED | OUTPATIENT
Start: 2021-08-31 | End: 2021-10-26

## 2021-08-31 RX ADMIN — SUCRALFATE 1 G: 1 TABLET ORAL at 23:12

## 2021-08-31 RX ADMIN — HYDRALAZINE HYDROCHLORIDE 100 MG: 25 TABLET, FILM COATED ORAL at 23:12

## 2021-08-31 RX ADMIN — ACETAMINOPHEN 650 MG: 325 TABLET ORAL at 03:02

## 2021-08-31 RX ADMIN — HYDRALAZINE HYDROCHLORIDE 100 MG: 25 TABLET, FILM COATED ORAL at 17:43

## 2021-08-31 RX ADMIN — Medication 10 ML: at 17:43

## 2021-08-31 RX ADMIN — Medication 10 ML: at 06:00

## 2021-08-31 RX ADMIN — POLYETHYLENE GLYCOL 3350 17 G: 17 POWDER, FOR SOLUTION ORAL at 07:39

## 2021-08-31 RX ADMIN — SUCRALFATE 1 G: 1 TABLET ORAL at 17:43

## 2021-08-31 RX ADMIN — CARVEDILOL 25 MG: 12.5 TABLET, FILM COATED ORAL at 17:43

## 2021-08-31 RX ADMIN — Medication 10 ML: at 14:00

## 2021-08-31 RX ADMIN — POLYETHYLENE GLYCOL 3350 17 G: 17 POWDER, FOR SOLUTION ORAL at 07:41

## 2021-08-31 RX ADMIN — POLYETHYLENE GLYCOL 3350 17 G: 17 POWDER, FOR SOLUTION ORAL at 07:35

## 2021-08-31 RX ADMIN — POLYETHYLENE GLYCOL 3350 17 G: 17 POWDER, FOR SOLUTION ORAL at 07:40

## 2021-08-31 RX ADMIN — Medication 10 ML: at 22:05

## 2021-08-31 NOTE — DISCHARGE SUMMARY
Physician Discharge Summary     Patient ID:  Kay Garcia  [id number]  70 y.o.  1975    Admit date: 8/28/2021    Discharge date and time: 8/31/2021    Admission Diagnoses: Acute blood loss anemia [D62]    Discharge Diagnoses: Active Problems:    Acute blood loss anemia (8/28/2021)           Hospital Course:   Kathrin Leyden is a 55 y.o. AAM hx CAD, ESRD p/w 2 weeks of fatigue, wheatley. He presented to outside hospital where he also noted 2 weeks of black stools. He says he has severe L hip pain, in his buttocks, for which he takes 800mg + advil, and 2+ BC Powders daily. He does not drink alcohol. He denies lightheadedness, dizziness, syncope. No hematemsis.      At OSH he is found ot have Hb 5.5. Initially mildly hypoxemic but with increased WOB he was placed on BiPAP. He normally gets HD TThSat and missed today.      On arrival, he is satting well on 2L nc. He was admitted for further evaluation and treatment of the following:        #ABLA 2/2 UGIB: Likely gastric ulcer in s/o copious NSAID use. Has required 4u pRBCs to get Hb > 7. Now stable. EGD with NSAID induced gastropathy. He was discharged with an 8 week course of BID PPI and instructed on avoiding NSAIDs, BC powder. He will follow up with GI for consideration of colonoscopy.             #AHRF: Mild here requiring 2L nc, but had briefly been on BiPAP at OSH. He is vaccinated against covid and had a negative rapid test at OSH. Favor pulmonary edema having missed HD on admit. He was followed by Nephrology here and following ultrafiltration w/ extra pull off, he was discharged on room air.                #HTN:  On nifedipine 60mg, carved 25mg, hydral 100mg TID, furosemide 40mg as outpt              #CAD: No chest pain at present. carvedilol     #L hip pain: Has led to him taking excess NSAIDs. Plain film unrevealing.  Should f/u with PCP         PCP: Tanika Wiggins MD     Consults: GI and Nephrology    Condition of patient at discharge: good and improved    Discharge Exam:    Physical Exam:    Gen: Well-developed, well-nourished, in no acute distress  HEENT:  Pink conjunctivae, PERRL, hearing intact to voice, moist mucous membranes  Neck: Supple, without masses, thyroid non-tender  Resp: No accessory muscle use, clear breath sounds without wheezes rales or rhonchi  Card: No murmurs, normal S1, S2 without thrills, bruits or peripheral edema  Abd:  Soft, non-tender, non-distended, normoactive bowel sounds are present, no palpable organomegaly and no detectable hernias  Lymph:  No cervical or inguinal adenopathy  Musc: No cyanosis or clubbing  Skin: No rashes or ulcers, skin turgor is good  Neuro:  Cranial nerves are grossly intact, no focal motor weakness, follows commands appropriately  Psych:  Good insight, oriented to person, place and time, alert          Disposition: home    Patient Instructions:   Current Discharge Medication List      START taking these medications    Details   pantoprazole (PROTONIX) 40 mg tablet Take 1 Tablet by mouth two (2) times a day for 56 days. Indications: a stomach ulcer  Qty: 112 Tablet, Refills: 0         CONTINUE these medications which have NOT CHANGED    Details   albuterol (PROVENTIL HFA, VENTOLIN HFA, PROAIR HFA) 90 mcg/actuation inhaler Take 2 Puffs by inhalation every four (4) hours as needed for Wheezing. Qty: 1 Inhaler, Refills: 1    Associated Diagnoses: Acute pulmonary edema (HCC)      calcium acetate,phosphat bind, (PHOSLO) 667 mg cap as directed. NIFEdipine ER (PROCARDIA XL) 60 mg ER tablet Take 1 Tablet by mouth daily. Qty: 90 Tablet, Refills: 2    Associated Diagnoses: Essential hypertension      carvediloL (COREG) 25 mg tablet Take 25 mg by mouth two (2) times daily (with meals). hydrALAZINE (APRESOLINE) 100 mg tablet Take 100 mg by mouth three (3) times daily. furosemide (LASIX) 40 mg tablet Take 40 mg by mouth daily.          STOP taking these medications       omeprazole (PRILOSEC) 40 mg capsule Comments: Reason for Stopping: Follow-up with Romy Lees MD in 1 week. Follow-up tests/labs None    Approximate time spent in patient care on day of discharge: 35 min    Signed:   Radha Mueller MD  8/31/2021  4:03 PM

## 2021-08-31 NOTE — ADVANCED PRACTICE NURSE
\[facility]\"" / [phone]    Vitals Pre Post Assessment Pre Post   BP BP: (!) 166/96 (08/31/21 1245) 179/99 LOC A&O x 3 A&O x 3   HR Pulse (Heart Rate): 61 (08/31/21 1245) 67 Lungs clear clear   Resp Resp Rate: 20 (08/31/21 1245) 17 Cardiac regular regular   Temp Temp: 97.7 °F (36.5 °C) (08/31/21 1155) 99 Skin warm warm   Weight Pre-Dialysis Weight: 99.9 kg (220 lb 3.8 oz) (08/31/21 1155) 95.3 Edema generalized generalized   Tele status remote remote Pain Pain Intensity 1: 0 (08/30/21 1331) No pain     Orders   Duration: Start: 1200 End: 1600 Total: 4 hr   Dialyzer: Dialyzer/Set Up Inspection: Yu ([id number]) (08/31/21 1155)   K Bath: Dialysate K (mEq/L): 2 (08/31/21 1155)   Ca Bath: Dialysate CA (mEq/L): 2.5 (08/31/21 1155)   Na: Dialysate NA (mEq/L): 140 (08/31/21 1155)   Bicarb: Dialysate HCO3 (mEq/L): 35 (08/31/21 1155)   Target Fluid Removal: Goal/Amount of Fluid to Remove (mL): 4000 mL (08/31/21 1155)     Access   Type & Location: ALEJANDRA AVF   Comments:                Patent, B&T positive, cannulated with 15 g needles x 2                        Labs   HBsAg (Antigen) / date:        08/29/21 Negative                                       HBsAb (Antibody) / date: 05/27/21 Immune   Source: [facility]/PP   Obtained/Reviewed  Critical Results Called HGB   Date Value Ref Range Status   08/31/2021 7.7 (L) 12.1 - 17.0 g/dL Final     Potassium   Date Value Ref Range Status   08/30/2021 4.7 3.5 - 5.1 mmol/L Final     Comment:     INVESTIGATED PER DELTA CHECK PROTOCOL     Calcium   Date Value Ref Range Status   08/30/2021 8.0 (L) 8.5 - 10.1 MG/DL Final     BUN   Date Value Ref Range Status   08/30/2021 38 (H) 6 - 20 MG/DL Final     Comment:     INVESTIGATED PER DELTA CHECK PROTOCOL     Creatinine   Date Value Ref Range Status   08/30/2021 9.00 (H) 0.70 - 1.30 MG/DL Final     Comment:     INVESTIGATED PER DELTA CHECK PROTOCOL        Meds Given   Name Dose Route                    Adequacy / Fluid Total Liters Process: 86 L   Net Fluid Removed: 4000 ml      Comments   Time Out Done:   (Time) Yes, 1155   Admitting Diagnosis: Renal failure   Consent obtained/signed: Informed Consent Verified: Yes (08/29/21 1809)   Machine / Tomas Souza # Machine Number: [id number] (08/31/21 4735)   Primary Nurse Rpt Pre: Mariia Dixon RN   Primary Nurse Rpt Post: Mariia Dixon RN   Pt Education: Yes, r/t dialysis process   Care Plan: Continue HD as ordered   Pts outpatient clinic:      Tx Summary   Comments: Tolerated tx well, at end, all blood in circuit returned with 300 ml NS, ALEJANDRA patent, B&T positive, bleeding stopped at both sites, pressure dressing in place.

## 2021-08-31 NOTE — PROGRESS NOTES
[address] NP  [phone]           GI PROGRESS NOTE        NAME: Nirav Butcher   :  1975   MRN:  [id number]       Subjective:   No complaints this afternoon. No rectal bleeding. Objective:   NAD, Receiving dialysis. VITALS:   Last 24hrs VS reviewed since prior progress note. Most recent are:  Visit Vitals  BP (!) (P) 161/86   Pulse (P) 60   Temp 97.7 °F (36.5 °C) (Axillary)   Resp (P) 18   Wt 94.1 kg (207 lb 6.4 oz)   SpO2 (P) 97%   BMI 27.36 kg/m²     No intake or output data in the 24 hours ending 21 1524    PHYSICAL EXAM:  General: Alert, in no acute distress    HEENT: Anicteric sclerae. Lungs:            CTA Bilaterally. Heart:  Regular  rhythm,    Abdomen: Soft, Non distended, Non tender.  (+)Bowel sounds, no HSM  Extremities: No c/c/e  Neurologic:  CN 2-12 gi, Alert and oriented X 3. No acute neurological distress   Psych:   Good insight. Not anxious nor agitated. Lab Data Reviewed:   Recent Labs     21  0306 21  0257 21  1320 21  0445   WBC  --  4.4  --  5.7   HGB 7.7* 7.9*   < > 5.2*   HCT 24.7* 25.0*   < > 17.8*   PLT  --  160  --  159    < > = values in this interval not displayed.      Recent Labs     21  0257 21  0445    140   K 4.7 5.9*    103   CO2 31 28   BUN 38* 75*   CREA 9.00* 13.60*   GLU 83 78   CA 8.0* 8.8     Recent Labs     21  0257   AP 74   TP 6.3*   ALB 3.3*   GLOB 3.0       ________________________________________________________________________  Patient Active Problem List   Diagnosis Code    ESRD (end stage renal disease) on dialysis (HCC) N18.6, Z99.2    Anemia D64.9    HTN (hypertension) I10    Acute hyperkalemia E87.5    Diarrhea R19.7    Infection of scalp L08.9    Benign neoplasm of scalp and skin of neck D23.4    Hyperkalemia E87.5    Bronchospasm J98.01    Pulmonary edema J81.1    Hypertension I10    Acute blood loss anemia D62         Assessment and Plan:  Anemia:  Status post EGD yesterday which showed Acute NSAIDs induced gastropathy seen, however no active bleeding or old blood seen. Colonoscopy postponed due to pts dialysis schedule. - Continue to monitor hemoglobin. - No NSAIDs or anticoagulants  - Plan is to continue to monitor for active bleeding.   If he begins actively bleeding will proceed with colonoscopy as inpatient and if not will arrange for outpatient colonsocopy    Discussed with Dr Cristobal Guevara By: Sekou Nguyen NP     8/31/2021  3:24 PM

## 2021-08-31 NOTE — PERIOP NOTES
Duyen Yanl  1975  [id number]    Situation:    Scheduled Procedure: Procedure(s):  COLONOSCOPY  Verbal report received from: Tony Ritchie RN  Preoperative diagnosis: anemia    Background:    Procedure: Procedure(s):  COLONOSCOPY  Physician performing procedure; Dr. Roseline Badillo RN    NPO Status/Last PO Intake: yes, except for prep      Is the patient taking Blood Thinners: NO   Is the patient diabetic:no          Does the patient have a Pacemaker/Defibrillator in place?: no   Does the patient need antibiotics before/during/after procedure: no   If the patient is having a colon, How much prep was drank? All miralax   What were the Colon prep results? Clear liquid   Does the patient have SCD in place:no   Is patient on CONTACT precautions:no      Assessment:  Are the vital signs stable prior to patient coming to ENDO?  yes  Is the patient alert/oriented and able to sign consent for the procedures:yes  How does the patient's abdomen feel prior to coming to ENDO? round and soft yes   Does the patient have a patient IV in place?  Yes L EJ 20 G      Recommendation:  Family or Friend present no     Permission to share finding with Family or Friend n/a

## 2021-08-31 NOTE — PROGRESS NOTES
Bedside and Verbal shift change report given to *** (oncoming nurse) by Sandie Nichole (offgoing nurse). Report included the following information {SBAR REPORTS [id number]}.

## 2021-08-31 NOTE — PROGRESS NOTES
8/31/2021  10:29 AM  Pt discussed in IDR rounds, is continuing to require medical management for acute blood loss anemia, HTN, CAD, L hip pain, Acute hypoxic respiratory failure    Transitions of Care Plan:   Pt is Readmission from [facility] 23 % Moderate Risk  LOS 3 Days  1. Patient continues to require medical management, plan for colonoscopy today, pt currently on 2L O2, monitor for home O2 needs  2. Plan for HD today  3. DC when medically stable to home w/ family assistance   4. outpatient follow up. PCP, resume HD Tu,Th,Sat @ [facility]  5. Transport home per family at DC. 6. Pt resides outside of [address] [place] [address]  7. CM to monitor progress and recommendations.   Ronnie Caballero, BARBIE

## 2021-08-31 NOTE — PROGRESS NOTES
Paged Dr. Av Cooper \"Double checking the d/c order because per endo the GI doctor said he was going to keep him to monitor the hg and bleeding for a few more days. Pt also states that he is uncomfortable going home. Also, is he able to go back on a diet? Thanks. \" Waiting for response     Dr. Av Cooper \"GI said its ok but if pt not comfortable then can stay overnight.  CBC in morning\"

## 2021-08-31 NOTE — PROGRESS NOTES
[facility]  [address]  [phone]      Medical Progress Note      NAME: Krupa Prakash   :  1975  MRM:  [id number]    Date/Time: 2021         Assessment / Plan:     #ABLA 2/2 UGIB: Likely gastric ulcer in s/o copious NSAID use. Has required 4u pRBCs to get Hb > 7. Now stable. EGD with NSAID induced gastropathy.               - IV PPI BID to PO this evening              - Monitor H/H one more night to ensure no further transfusion needs              - No further NSAIDs              - Ensure IV access     #AHRF: Mild here requiring 2L nc, but had briefly been on BiPAP at OSH. He is vaccinated against covid and had a negative rapid test at OSH. Favor pulmonary edema having missed HD on admit. Now on RA following HD with increased UF pulloff                #HTN: High here in s/o missed HD. On nifedipine 60mg, carved 25mg, hydral 100mg TID, furosemide 40mg as outpt              - Can resume all now     #CAD: No chest pain at present              - carvedilol     #L hip pain: Has led to him taking excess NSAIDs. Plain film unrevealing, bursitis? Care Plan discussed with: Patient, Nursing Staff and Consultant/Specialist    Discussed:  Care Plan    Prophylaxis:  SCD's    Disposition:  Home w/Family           ___________________________________________________    Attending Physician: Craig Walters MD        Subjective:     Chief Complaint:  Rao Cocker, feels well without complaints. Was going to have scope today, than cancelled. Pt not yet tolerating diet. Will monitor for serial H/H since required 4u. ROS:  (bold if positive, if negative)              Objective:       Vitals:          Last 24hrs VS reviewed since prior progress note.  Most recent are:    Visit Vitals  BP (!) 170/93   Pulse 69   Temp (P) 99 °F (37.2 °C) (Oral)   Resp 16   Wt 94.1 kg (207 lb 6.4 oz)   SpO2 96%   BMI 27.36 kg/m²     SpO2 Readings from Last 6 Encounters:   08/31/21 96%   08/28/21 100%   08/20/21 98%   08/13/21 95%   07/07/21 93%   06/17/21 93%    O2 Flow Rate (L/min): 2 l/min     No intake or output data in the 24 hours ending 08/31/21 1168       Exam:     Physical Exam:    Gen:  Well-developed, well-nourished, in no acute distress  HEENT:  Pink conjunctivae, PERRL, hearing intact to voice, moist mucous membranes  Neck:  Supple, without masses, thyroid non-tender  Resp:  No accessory muscle use, clear breath sounds without wheezes rales or rhonchi  Card:  No murmurs, normal S1, S2 without thrills, bruits or peripheral edema  Abd:  Soft, non-tender, non-distended, normoactive bowel sounds are present  Musc:  No cyanosis or clubbing  Skin:  No rashes or ulcers, skin turgor is good  Neuro:  Cranial nerves 3-12 are grossly intact,  strength is 5/5 bilaterally and dorsi / plantarflexion is 5/5 bilaterally, follows commands appropriately  Psych:  Good insight, oriented to person, place and time, alert       Medications Reviewed: (see below)    Lab Data Reviewed: (see below)    ______________________________________________________________________    Medications:     Current Facility-Administered Medications   Medication Dose Route Frequency    furosemide (LASIX) tablet 40 mg  40 mg Oral DAILY    sucralfate (CARAFATE) tablet 1 g  1 g Oral AC&HS    0.9% sodium chloride infusion 250 mL  250 mL IntraVENous PRN    0.9% sodium chloride infusion 250 mL  250 mL IntraVENous PRN    carvediloL (COREG) tablet 25 mg  25 mg Oral BID WITH MEALS    hydrALAZINE (APRESOLINE) tablet 100 mg  100 mg Oral TID    NIFEdipine ER (PROCARDIA XL) tablet 60 mg  60 mg Oral DAILY    0.9% sodium chloride infusion 250 mL  250 mL IntraVENous PRN    0.9% sodium chloride infusion 250 mL  250 mL IntraVENous PRN    sodium chloride (NS) flush 5-40 mL  5-40 mL IntraVENous Q8H    sodium chloride (NS) flush 5-40 mL  5-40 mL IntraVENous PRN    acetaminophen (TYLENOL) tablet 650 mg  650 mg Oral Q6H PRN    Or    acetaminophen (TYLENOL) suppository 650 mg  650 mg Rectal Q6H PRN    polyethylene glycol (MIRALAX) packet 17 g  17 g Oral DAILY PRN    ondansetron (ZOFRAN ODT) tablet 4 mg  4 mg Oral Q8H PRN    Or    ondansetron (ZOFRAN) injection 4 mg  4 mg IntraVENous Q6H PRN    pantoprazole (PROTONIX) 40 mg in 0.9% sodium chloride 10 mL injection  40 mg IntraVENous Q12H    albuterol-ipratropium (DUO-NEB) 2.5 MG-0.5 MG/3 ML  3 mL Nebulization Q6H PRN    hydrALAZINE (APRESOLINE) 20 mg/mL injection 20 mg  20 mg IntraVENous Q6H PRN            Lab Review:     Recent Labs     08/31/21  0306 08/30/21  0257 08/29/21  1320 08/29/21  0445 08/29/21  0445   WBC  --  4.4  --   --  5.7   HGB 7.7* 7.9* 6.9*   < > 5.2*   HCT 24.7* 25.0* 21.6*   < > 17.8*   PLT  --  160  --   --  159    < > = values in this interval not displayed.      Recent Labs     08/30/21 0257 08/29/21 0445    140   K 4.7 5.9*    103   CO2 31 28   GLU 83 78   BUN 38* 75*   CREA 9.00* 13.60*   CA 8.0* 8.8   ALB 3.3*  --    ALT 11*  --      No components found for: [place]

## 2021-08-31 NOTE — PROGRESS NOTES
8/31/2021 4:07 PM Medicare pt has received, reviewed, and signed 2nd IM letter informing them of their right to appeal the discharge. Signed copy has been placed on pt's hard chart. Pt was provided a copy for their records.

## 2021-08-31 NOTE — PROGRESS NOTES
[facility]  YOB: 1975          Assessment & Plan:   1. ESRD TTS @ [facility] (DR. DALTON)  - PLAN FOR HD TODAY (TTS)  -PULL 3-4 KG    2. ANEMIA/GIB  - SCOPE TODAY    3. HTN  - INCREASE UF WITH HD             Subjective:   CC: ESRD TTS  HPI: Patient seen. NPO for colonoscopy  ROS: see above   Current Facility-Administered Medications   Medication Dose Route Frequency    furosemide (LASIX) tablet 40 mg  40 mg Oral DAILY    sucralfate (CARAFATE) tablet 1 g  1 g Oral AC&HS    0.9% sodium chloride infusion 250 mL  250 mL IntraVENous PRN    0.9% sodium chloride infusion 250 mL  250 mL IntraVENous PRN    carvediloL (COREG) tablet 25 mg  25 mg Oral BID WITH MEALS    hydrALAZINE (APRESOLINE) tablet 100 mg  100 mg Oral TID    NIFEdipine ER (PROCARDIA XL) tablet 60 mg  60 mg Oral DAILY    0.9% sodium chloride infusion 250 mL  250 mL IntraVENous PRN    0.9% sodium chloride infusion 250 mL  250 mL IntraVENous PRN    sodium chloride (NS) flush 5-40 mL  5-40 mL IntraVENous Q8H    sodium chloride (NS) flush 5-40 mL  5-40 mL IntraVENous PRN    acetaminophen (TYLENOL) tablet 650 mg  650 mg Oral Q6H PRN    Or    acetaminophen (TYLENOL) suppository 650 mg  650 mg Rectal Q6H PRN    polyethylene glycol (MIRALAX) packet 17 g  17 g Oral DAILY PRN    ondansetron (ZOFRAN ODT) tablet 4 mg  4 mg Oral Q8H PRN    Or    ondansetron (ZOFRAN) injection 4 mg  4 mg IntraVENous Q6H PRN    pantoprazole (PROTONIX) 40 mg in 0.9% sodium chloride 10 mL injection  40 mg IntraVENous Q12H    albuterol-ipratropium (DUO-NEB) 2.5 MG-0.5 MG/3 ML  3 mL Nebulization Q6H PRN    hydrALAZINE (APRESOLINE) 20 mg/mL injection 20 mg  20 mg IntraVENous Q6H PRN          Objective:     Vitals:  Blood pressure (!) 162/88, pulse 68, temperature 97.8 °F (36.6 °C), resp. rate 18, weight 94.1 kg (207 lb 6.4 oz), SpO2 97 %.   Temp (24hrs), Av.1 °F (36.7 °C), Min:97.8 °F (36.6 °C), Max:98.7 °F (37.1 °C)      Intake and Output:  No intake/output data recorded. 08/29 1901 - 08/31 0700  In: 240 [P.O.:240]  Out: 125 [Urine:125]    Physical Exam:                Patient is intubated:  NO    Physical Examination:   GENERAL ASSESSMENT: NAD  HEENT:Nontraumatic   CHEST: CTA  HEART: S1S2  ABDOMEN: Soft,NT,  :Heller:   EXTREMITY: EDEMA  NEURO:Grossly non focal          ECG/rhythm:    Data Review      No results for input(s): TNIPOC in the last 72 hours. No lab exists for component: ITNL   No results for input(s): CPK, CKMB, TROIQ in the last 72 hours. Recent Labs     08/31/21  0306 08/30/21  0257 08/29/21  1320 08/29/21  0445 08/29/21  0445   NA  --  139  --   --  140   K  --  4.7  --   --  5.9*   CL  --  102  --   --  103   CO2  --  31  --   --  28   BUN  --  38*  --   --  75*   CREA  --  9.00*  --   --  13.60*   GLU  --  83  --   --  78   CA  --  8.0*  --   --  8.8   ALB  --  3.3*  --   --   --    WBC  --  4.4  --   --  5.7   HGB 7.7* 7.9* 6.9*   < > 5.2*   HCT 24.7* 25.0* 21.6*   < > 17.8*   PLT  --  160  --   --  159    < > = values in this interval not displayed. No results for input(s): INR, PTP, APTT, INREXT, INREXT in the last 72 hours. Needs: urine analysis, urine sodium, protein and creatinine  No results found for: Ivelisse Cleary      Discussed with:  PATIENT AND DIALYSIS NURSE     : Salome De La Rosa MD  8/31/2021        [facility]:  [email or web address]. [employer]  Francesca Call office:  [address] [place] [address],[address] [place] [address], [address] [place] [address]  Phone: [phone]  Fax :     [phone]    [place] office:  [address] [place] [address]  Verna Lockwood  Phone - [phone]  Fax - [phone]

## 2021-09-01 VITALS
HEART RATE: 71 BPM | RESPIRATION RATE: 22 BRPM | OXYGEN SATURATION: 94 % | WEIGHT: 207.4 LBS | TEMPERATURE: 98 F | DIASTOLIC BLOOD PRESSURE: 80 MMHG | BODY MASS INDEX: 27.36 KG/M2 | SYSTOLIC BLOOD PRESSURE: 162 MMHG

## 2021-09-01 LAB
HCT VFR BLD AUTO: 26 % (ref 36.6–50.3)
HGB BLD-MCNC: 8.2 G/DL (ref 12.1–17)

## 2021-09-01 PROCEDURE — 36415 COLL VENOUS BLD VENIPUNCTURE: CPT

## 2021-09-01 PROCEDURE — 74011250637 HC RX REV CODE- 250/637: Performed by: INTERNAL MEDICINE

## 2021-09-01 PROCEDURE — 85018 HEMOGLOBIN: CPT

## 2021-09-01 RX ORDER — SODIUM CHLORIDE 9 MG/ML
50 INJECTION, SOLUTION INTRAVENOUS CONTINUOUS
Status: SHIPPED | OUTPATIENT
Start: 2021-09-01 | End: 2021-09-01

## 2021-09-01 RX ORDER — NIFEDIPINE 60 MG/1
60 TABLET, EXTENDED RELEASE ORAL 2 TIMES DAILY
Status: DISCONTINUED | OUTPATIENT
Start: 2021-09-01 | End: 2021-09-01 | Stop reason: HOSPADM

## 2021-09-01 RX ORDER — FLUMAZENIL 0.1 MG/ML
0.2 INJECTION INTRAVENOUS
Status: SHIPPED | OUTPATIENT
Start: 2021-09-01 | End: 2021-09-01

## 2021-09-01 RX ORDER — MIDAZOLAM HYDROCHLORIDE 1 MG/ML
.25-5 INJECTION, SOLUTION INTRAMUSCULAR; INTRAVENOUS
Status: DISCONTINUED | OUTPATIENT
Start: 2021-09-01 | End: 2021-09-01 | Stop reason: HOSPADM

## 2021-09-01 RX ORDER — DEXTROMETHORPHAN/PSEUDOEPHED 2.5-7.5/.8
1.2 DROPS ORAL
Status: DISCONTINUED | OUTPATIENT
Start: 2021-09-01 | End: 2021-09-01 | Stop reason: HOSPADM

## 2021-09-01 RX ORDER — NALOXONE HYDROCHLORIDE 0.4 MG/ML
0.4 INJECTION, SOLUTION INTRAMUSCULAR; INTRAVENOUS; SUBCUTANEOUS
Status: SHIPPED | OUTPATIENT
Start: 2021-09-01 | End: 2021-09-01

## 2021-09-01 RX ORDER — EPINEPHRINE 0.1 MG/ML
1 INJECTION INTRACARDIAC; INTRAVENOUS
Status: DISCONTINUED | OUTPATIENT
Start: 2021-09-01 | End: 2021-09-01 | Stop reason: HOSPADM

## 2021-09-01 RX ORDER — SUCRALFATE 1 G/1
1 TABLET ORAL
Qty: 60 TABLET | Refills: 0 | Status: SHIPPED | OUTPATIENT
Start: 2021-09-01 | End: 2021-09-16

## 2021-09-01 RX ORDER — ATROPINE SULFATE 0.1 MG/ML
0.4 INJECTION INTRAVENOUS
Status: DISCONTINUED | OUTPATIENT
Start: 2021-09-01 | End: 2021-09-01 | Stop reason: HOSPADM

## 2021-09-01 RX ADMIN — HYDRALAZINE HYDROCHLORIDE 100 MG: 25 TABLET, FILM COATED ORAL at 08:42

## 2021-09-01 RX ADMIN — CARVEDILOL 25 MG: 12.5 TABLET, FILM COATED ORAL at 08:42

## 2021-09-01 RX ADMIN — SUCRALFATE 1 G: 1 TABLET ORAL at 07:00

## 2021-09-01 RX ADMIN — NIFEDIPINE 60 MG: 60 TABLET, EXTENDED RELEASE ORAL at 08:42

## 2021-09-01 RX ADMIN — Medication 10 ML: at 05:06

## 2021-09-01 RX ADMIN — FUROSEMIDE 40 MG: 40 TABLET ORAL at 08:42

## 2021-09-01 RX ADMIN — PANTOPRAZOLE SODIUM 40 MG: 40 TABLET, DELAYED RELEASE ORAL at 08:42

## 2021-09-01 RX ADMIN — SUCRALFATE 1 G: 1 TABLET ORAL at 11:15

## 2021-09-01 NOTE — PROGRESS NOTES
Discussed discharge instructions and summary with patient. Patient verbalized understanding of instructions and medications. Patient aware that prescriptions were sent electronically to patient's usual pharmacy. Patient signed physical copy of paperwork due to e-sign not working. Copy was made, original copy was handed to patient, and copy was placed in patient's chart. Opportunity for questions/clarification provided. VSS. IV removed with no complication. Pt took all belongings with them.

## 2021-09-01 NOTE — DISCHARGE INSTRUCTIONS
HOSPITALIST DISCHARGE INSTRUCTIONS  NAME: Kulwinder Albert   :  1975   MRN:  [id number]     Date/Time:  2021 4:01 PM    ADMIT DATE: 2021     DISCHARGE DATE: 2021     ADMITTING DIAGNOSIS:  Shortness of Breath  Anemia    DISCHARGE DIAGNOSIS:  Anemia  Gastric Ulcers    You were admitted for evaluation of shortness of breath and found to have very low hemoglobin levels (anemia). This was caused by bleeding ulcers in your stomach. These ulcers formed due to taking too much advil and BC powder. You received several transfusions to improve your blood levels. It is very important that you do NOT take any more NSAIDs like advil or BC powder. Additionally, you will need to take a medicine to reduce the acid in your stomach to allow the ulcers to heal. That medicine is called pantoprazole. You need to take it twice daily for 8 weeks. I have sent in a prescription to your pharmacy. MEDICATIONS:    · It is important that you take the medication exactly as they are prescribed. · Keep your medication in the bottles provided by the pharmacist and keep a list of the medication names, dosages, and times to be taken in your wallet. · Do not take other medications without consulting your doctor. Pain Management: per above medications    If you experience any of the following symptoms then please call your primary care physician or return to the emergency room if you cannot get hold of your doctor:  Fever, chills, nausea, vomiting, diarrhea, change in mentation, falling, bleeding, shortness of breath    Follow Up:  Dr. Ke Waller MD  you are to call and set up an appointment to see them in 1 week. Information obtained by :  I understand that if any problems occur once I am at home I am to contact my physician. I understand and acknowledge receipt of the instructions indicated above. Physician's or R.N.'s Signature                                                                  Date/Time                                                                                                                                              Patient or Representative Signature                                                          Date/Time

## 2021-09-01 NOTE — PROGRESS NOTES
Problem: Fluid Volume - Risk of, Imbalanced  Goal: *Balanced intake and output  Outcome: Progressing Towards Goal     Problem: Patient Education: Go to Patient Education Activity  Goal: Patient/Family Education  Outcome: Progressing Towards Goal     Problem: Falls - Risk of  Goal: *Absence of Falls  Description: Document Lynn Fall Risk and appropriate interventions in the flowsheet.   Outcome: Progressing Towards Goal  Note: Fall Risk Interventions:            Medication Interventions: Teach patient to arise slowly    Elimination Interventions: Patient to call for help with toileting needs              Problem: Patient Education: Go to Patient Education Activity  Goal: Patient/Family Education  Outcome: Progressing Towards Goal

## 2021-09-01 NOTE — PROGRESS NOTES
..                              [facility]  YOB: 1975          Assessment & Plan:   1. ESRD TTS @ [facility] (DR. DALTON)  - HAD HD WITH NET UF :4 KG  -PLAN FOR NEXT HD THURSDAY  -PULL 3-4 KG    2. ANEMIA/GIB  - PLAN FOR COLONOSCOPY    3. HTN  - INCREASE UF WITH HD   -CHANGE NIFEDIPINE 60 MG BID             Subjective:   CC: ESRD TTS  HPI: Patient seen.  No complaints  ROS: see above   Current Facility-Administered Medications   Medication Dose Route Frequency    midazolam (VERSED) injection 0.25-5 mg  0.25-5 mg IntraVENous Multiple    simethicone (MYLICON) 65ZY/6.8XM oral drops 80 mg  1.2 mL Oral Multiple    atropine injection 0.4 mg  0.4 mg IntraVENous ONCE PRN    EPINEPHrine (ADRENALIN) 0.1 mg/mL syringe 1 mg  1 mg Endoscopically ONCE PRN    NIFEdipine ER (PROCARDIA XL) tablet 60 mg  60 mg Oral BID    pantoprazole (PROTONIX) tablet 40 mg  40 mg Oral BID    furosemide (LASIX) tablet 40 mg  40 mg Oral DAILY    sucralfate (CARAFATE) tablet 1 g  1 g Oral AC&HS    0.9% sodium chloride infusion 250 mL  250 mL IntraVENous PRN    0.9% sodium chloride infusion 250 mL  250 mL IntraVENous PRN    carvediloL (COREG) tablet 25 mg  25 mg Oral BID WITH MEALS    hydrALAZINE (APRESOLINE) tablet 100 mg  100 mg Oral TID    0.9% sodium chloride infusion 250 mL  250 mL IntraVENous PRN    0.9% sodium chloride infusion 250 mL  250 mL IntraVENous PRN    sodium chloride (NS) flush 5-40 mL  5-40 mL IntraVENous Q8H    sodium chloride (NS) flush 5-40 mL  5-40 mL IntraVENous PRN    acetaminophen (TYLENOL) tablet 650 mg  650 mg Oral Q6H PRN    Or    acetaminophen (TYLENOL) suppository 650 mg  650 mg Rectal Q6H PRN    polyethylene glycol (MIRALAX) packet 17 g  17 g Oral DAILY PRN    ondansetron (ZOFRAN ODT) tablet 4 mg  4 mg Oral Q8H PRN    Or    ondansetron (ZOFRAN) injection 4 mg  4 mg IntraVENous Q6H PRN    albuterol-ipratropium (DUO-NEB) 2.5 MG-0.5 MG/3 ML  3 mL Nebulization Q6H PRN    hydrALAZINE (APRESOLINE) 20 mg/mL injection 20 mg  20 mg IntraVENous Q6H PRN          Objective:     Vitals:  Blood pressure (!) 171/93, pulse 71, temperature 98.7 °F (37.1 °C), resp. rate 20, weight 94.1 kg (207 lb 6.4 oz), SpO2 92 %. Temp (24hrs), Av.6 °F (37 °C), Min:97.7 °F (36.5 °C), Max:99 °F (37.2 °C)      Intake and Output:  No intake/output data recorded.  1901 -  0700  In: 250 [P.O.:250]  Out: 4100 [Urine:100]    Physical Exam:                Patient is intubated:  NO    Physical Examination:   GENERAL ASSESSMENT: NAD  HEENT:Nontraumatic   CHEST: CTA  HEART: S1S2  ABDOMEN: Soft,NT,  :Heller:   EXTREMITY: EDEMA  NEURO:Grossly non focal          ECG/rhythm:    Data Review      No results for input(s): TNIPOC in the last 72 hours. No lab exists for component: ITNL   No results for input(s): CPK, CKMB, TROIQ in the last 72 hours. Recent Labs     21  0021 21  0306 21  0257   NA  --   --  139   K  --   --  4.7   CL  --   --  102   CO2  --   --  31   BUN  --   --  38*   CREA  --   --  9.00*   GLU  --   --  83   CA  --   --  8.0*   ALB  --   --  3.3*   WBC  --   --  4.4   HGB 8.2* 7.7* 7.9*   HCT 26.0* 24.7* 25.0*   PLT  --   --  160      No results for input(s): INR, PTP, APTT, INREXT, INREXT in the last 72 hours. Needs: urine analysis, urine sodium, protein and creatinine  No results found for: Haresh Peters      Discussed with:  PATIENT AND DIALYSIS NURSE     : Amandeep Leonard MD  2021        [facility]:  [email or web address]  Samantha [facility] office:  [address] [place] [address], [address] [place] [address],[address]  [place], [address] [facility]  Phone: [phone]  Fax :     [phone]    [place] office:  [address] [facility]  [place], [facility] [address]   Phone - [phone]  Fax - [phone]

## 2021-09-01 NOTE — PROGRESS NOTES
Problem: Falls - Risk of  Goal: *Absence of Falls  Description: Document Jagdish Rupert Fall Risk and appropriate interventions in the flowsheet.   Outcome: Progressing Towards Goal  Note: Fall Risk Interventions:            Medication Interventions: Teach patient to arise slowly    Elimination Interventions: Patient to call for help with toileting needs

## 2021-09-01 NOTE — PROGRESS NOTES
9/1/2021 11:15 AM Pt to discharge today. CM called to pt's previous GI MD, Jerica Menendez's office in [place]. Pt already has an appt scheduled for 9/8 at 10AM. CM relayed this appt to pt and also added appt on pt's avs.   Pt's family will transport pt home. FANY Chappell     Care Management Interventions  PCP Verified by CM: Yes (attend an appointment with in the last week)  Mode of Transport at Discharge:  Other (see comment) (Family will provide transport)  Transition of Care Consult (CM Consult): Discharge Planning  Discharge Durable Medical Equipment: No  Physical Therapy Consult: No  Occupational Therapy Consult: No  Speech Therapy Consult: No  Current Support Network: Lives Alone  Confirm Follow Up Transport: Self  Discharge Location  Discharge Placement: Home with family assistance

## 2021-09-01 NOTE — DISCHARGE SUMMARY
Physician Discharge Summary     Patient ID:  Kay Garcia  [id number]  7 y.o.  1975    Admit date: 8/28/2021    Discharge date and time: 9/1/2021    Admission Diagnoses: Acute blood loss anemia [D62]    Discharge Diagnoses: Active Problems:    Acute blood loss anemia (8/28/2021)           Hospital Course:   Kathrin Leyden is a 55 y.o. AAM hx CAD, ESRD p/w 2 weeks of fatigue, wheatley. He presented to outside hospital where he also noted 2 weeks of black stools. He says he has severe L hip pain, in his buttocks, for which he takes 800mg + advil, and 2+ BC Powders daily. He does not drink alcohol. He denies lightheadedness, dizziness, syncope. No hematemsis.      At OSH he is found ot have Hb 5.5. Initially mildly hypoxemic but with increased WOB he was placed on BiPAP. He normally gets HD TThSat and missed today.      On arrival, he is satting well on 2L nc. He was admitted for further evaluation and treatment of the following:    #ABLA 2/2 UGIB: Likely gastric ulcer in s/o copious NSAID use. Has required 4u pRBCs to get Hb > 7. Now stable. EGD with NSAID induced gastropathy. He was discharged with an 8 week course of BID PPI and instructed on avoiding NSAIDs, BC powder. He will follow up with GI for consideration of colonoscopy. Appt scheduled for follow up next week.              #AHRF: Mild here requiring 2L nc, but had briefly been on BiPAP at OSH. He is vaccinated against covid and had a negative rapid test at OSH. Favor pulmonary edema having missed HD on admit. He was followed by Nephrology here and following ultrafiltration w/ extra pull off, he was discharged on room air.                #HTN:  On nifedipine 60mg, carved 25mg, hydral 100mg TID, furosemide 40mg as outpt              #CAD: No chest pain at present. carvedilol     #L hip pain: Has led to him taking excess NSAIDs. Plain film unrevealing.  Should f/u with PCP         PCP: Tanika Wiggins MD     Consults: GI and Nephrology    Condition of patient at discharge: good and improved    Discharge Exam:    Physical Exam:    Gen: Well-developed, well-nourished, in no acute distress  HEENT:  Pink conjunctivae, PERRL, hearing intact to voice, moist mucous membranes  Neck: Supple, without masses, thyroid non-tender  Resp: No accessory muscle use, clear breath sounds without wheezes rales or rhonchi  Card: No murmurs, normal S1, S2 without thrills, bruits or peripheral edema  Abd:  Soft, non-tender, non-distended, normoactive bowel sounds are present, no palpable organomegaly and no detectable hernias  Lymph:  No cervical or inguinal adenopathy  Musc: No cyanosis or clubbing  Skin: No rashes or ulcers, skin turgor is good  Neuro:  Cranial nerves are grossly intact, no focal motor weakness, follows commands appropriately  Psych:  Good insight, oriented to person, place and time, alert          Disposition: home    Patient Instructions:     Current Discharge Medication List      START taking these medications    Details   sucralfate (CARAFATE) 1 gram tablet Take 1 Tablet by mouth Before breakfast, lunch, dinner and at bedtime for 15 days. Qty: 60 Tablet, Refills: 0  Start date: 9/1/2021, End date: 9/16/2021      pantoprazole (PROTONIX) 40 mg tablet Take 1 Tablet by mouth two (2) times a day for 56 days. Indications: a stomach ulcer  Qty: 112 Tablet, Refills: 0  Start date: 8/31/2021, End date: 10/26/2021         CONTINUE these medications which have NOT CHANGED    Details   albuterol (PROVENTIL HFA, VENTOLIN HFA, PROAIR HFA) 90 mcg/actuation inhaler Take 2 Puffs by inhalation every four (4) hours as needed for Wheezing. Qty: 1 Inhaler, Refills: 1    Associated Diagnoses: Acute pulmonary edema (HCC)      calcium acetate,phosphat bind, (PHOSLO) 667 mg cap as directed. NIFEdipine ER (PROCARDIA XL) 60 mg ER tablet Take 1 Tablet by mouth daily.   Qty: 90 Tablet, Refills: 2    Associated Diagnoses: Essential hypertension      carvediloL (COREG) 25 mg tablet Take 25 mg by mouth two (2) times daily (with meals). hydrALAZINE (APRESOLINE) 100 mg tablet Take 100 mg by mouth three (3) times daily. furosemide (LASIX) 40 mg tablet Take 40 mg by mouth daily. STOP taking these medications       omeprazole (PRILOSEC) 40 mg capsule Comments:   Reason for Stopping:                Follow-up Information     Follow up With Specialties Details Why Brian Singh MD Family Medicine On 9/7/2021 [facility] Follow Up at 1:30pm [facility] [phone]      Tala Glass MD Gastroenterology On 9/8/2021 10AM follow up with GI MD [address] [place] [address]  [phone]            Approximate time spent in patient care on day of discharge: 35 min    Signed:  Melissa Vargas MD  9/1/2021  4:03 PM

## 2021-09-10 ENCOUNTER — TELEPHONE (OUTPATIENT)
Dept: FAMILY MEDICINE CLINIC | Age: 46
End: 2021-09-10

## 2021-11-22 ENCOUNTER — HOSPITAL ENCOUNTER (EMERGENCY)
Age: 46
Discharge: HOME | End: 2021-11-22
Payer: MEDICARE

## 2021-11-22 VITALS
TEMPERATURE: 98.3 F | BODY MASS INDEX: 28.36 KG/M2 | RESPIRATION RATE: 16 BRPM | SYSTOLIC BLOOD PRESSURE: 173 MMHG | WEIGHT: 214 LBS | DIASTOLIC BLOOD PRESSURE: 95 MMHG | HEIGHT: 73 IN | HEART RATE: 80 BPM | OXYGEN SATURATION: 97 %

## 2021-11-22 DIAGNOSIS — R23.8 SKIN PIMPLE: Primary | ICD-10-CM

## 2021-11-22 PROCEDURE — 99282 EMERGENCY DEPT VISIT SF MDM: CPT

## 2021-11-22 RX ORDER — PANTOPRAZOLE SODIUM 40 MG/1
40 TABLET, DELAYED RELEASE ORAL 2 TIMES DAILY
COMMUNITY

## 2021-11-22 RX ORDER — MUPIROCIN CALCIUM 20 MG/G
CREAM TOPICAL 2 TIMES DAILY
Qty: 15 G | Refills: 0 | Status: SHIPPED | OUTPATIENT
Start: 2021-11-22 | End: 2022-09-02

## 2021-11-22 NOTE — ED TRIAGE NOTES
Patient with c/o cyst formation to right ear and right lateral chest wall. C/O pain to the sites. Pus-like drainage to the right ear when he \"squeezes it. \" He has been applying ice packs with minimal relief. Denies fever.

## 2021-11-22 NOTE — ED PROVIDER NOTES
EMERGENCY DEPARTMENT HISTORY AND PHYSICAL EXAM      Date: 11/22/2021  Patient Name: Tabitha Carrera    History of Presenting Illness     Chief Complaint   Patient presents with    Skin Problem       History Provided By: Patient    HPI: Tabitha Carrera, 55 y.o. male with a past medical history significant for HTN; CAD h/o MI 12 yr ago and CHF; ESRD T/Th/Sat HD; Dr Reece Hernández that presents to the ED with cc of small pimple on right earlobe for the past several days and lipoma on the right lat rib cage. Pt has seen dermatology; Dr Selvin Mackey; for skin problems in the past.   Covid vaccine x 2. There are no other complaints, changes, or physical findings at this time. PCP: Velma Gowers, MD    No current facility-administered medications on file prior to encounter. Current Outpatient Medications on File Prior to Encounter   Medication Sig Dispense Refill    pantoprazole (PROTONIX) 40 mg tablet Take 40 mg by mouth two (2) times a day.  albuterol (PROVENTIL HFA, VENTOLIN HFA, PROAIR HFA) 90 mcg/actuation inhaler Take 2 Puffs by inhalation every four (4) hours as needed for Wheezing. 1 Inhaler 1    calcium acetate,phosphat bind, (PHOSLO) 667 mg cap as directed.  NIFEdipine ER (PROCARDIA XL) 60 mg ER tablet Take 1 Tablet by mouth daily. 90 Tablet 2    carvediloL (COREG) 25 mg tablet Take 25 mg by mouth two (2) times daily (with meals).  hydrALAZINE (APRESOLINE) 100 mg tablet Take 100 mg by mouth three (3) times daily.  furosemide (LASIX) 40 mg tablet Take 40 mg by mouth daily.          Past History     Past Medical History:  Past Medical History:   Diagnosis Date    CAD (coronary artery disease)     MI (12 years ago)    Chronic kidney disease     ESRD x4 years HD Treatment    Hypertension        Past Surgical History:  Past Surgical History:   Procedure Laterality Date    HX ORTHOPAEDIC      left carpal tunnel     HX OTHER SURGICAL      Fistula to L upper arm    HX ROTATOR CUFF REPAIR         Family History:  Family History   Problem Relation Age of Onset    Hypertension Mother     Hypertension Father     Hypertension Maternal Aunt        Social History:  Social History     Tobacco Use    Smoking status: Former Smoker     Years: 15.00    Smokeless tobacco: Never Used    Tobacco comment: l   Vaping Use    Vaping Use: Never used   Substance Use Topics    Alcohol use: Not Currently     Alcohol/week: 1.0 - 2.0 standard drink     Types: 1 - 2 Glasses of wine per week     Comment: social     Drug use: Never       Allergies: Allergies   Allergen Reactions    Bactrim [Sulfamethoxazole-Trimethoprim] Rash     Patient had a rash on his penis. Review of Systems     Review of Systems   Constitutional: Negative for chills and fever. HENT: Negative for sore throat. Eyes: Negative for visual disturbance. Respiratory: Negative for cough and shortness of breath. Cardiovascular: Negative for chest pain and palpitations. Gastrointestinal: Negative for abdominal pain, diarrhea, nausea and vomiting. Musculoskeletal: Negative for back pain. Skin: Negative for rash. Neurological: Negative for headaches. Psychiatric/Behavioral: Negative for confusion. Physical Exam     Physical Exam  Vitals and nursing note reviewed. Constitutional:       Appearance: He is well-developed. He is not diaphoretic. HENT:      Head: Normocephalic. Ears:      Comments: Small pimple on the right earlobe     Mouth/Throat:      Pharynx: No oropharyngeal exudate. Eyes:      Pupils: Pupils are equal, round, and reactive to light. Cardiovascular:      Rate and Rhythm: Normal rate and regular rhythm. Heart sounds: Normal heart sounds. No murmur heard. No friction rub. No gallop. Pulmonary:      Effort: Pulmonary effort is normal. No respiratory distress. Breath sounds: No wheezing or rales. Chest:      Chest wall: No tenderness.    Abdominal:      General: Bowel sounds are normal. There is no distension. Palpations: Abdomen is soft. Tenderness: There is no abdominal tenderness. There is no guarding or rebound. Musculoskeletal:         General: No tenderness. Cervical back: Normal range of motion and neck supple. Skin:     General: Skin is warm and dry. Findings: No erythema or rash. Comments: Lipoma right lat rib cage   Neurological:      Mental Status: He is alert and oriented to person, place, and time. Lab and Diagnostic Study Results     Labs -   No results found for this or any previous visit (from the past 12 hour(s)). Radiologic Studies -   @lastxrresult@  CT Results  (Last 48 hours)    None        CXR Results  (Last 48 hours)    None            Medical Decision Making   - I am the first provider for this patient. - I reviewed the vital signs, available nursing notes, past medical history, past surgical history, family history and social history. - Initial assessment performed. The patients presenting problems have been discussed, and they are in agreement with the care plan formulated and outlined with them. I have encouraged them to ask questions as they arise throughout their visit. Vital Signs-Reviewed the patient's vital signs. Patient Vitals for the past 12 hrs:   Temp Pulse Resp BP SpO2   11/22/21 1009 98.3 °F (36.8 °C) 80 16 (!) 173/95 97 %       Records Reviewed: Nursing Notes      ED Course:        Procedures   Med    Disposition   Disposition:   Discharged    DISCHARGE PLAN:  1. Current Discharge Medication List      CONTINUE these medications which have NOT CHANGED    Details   pantoprazole (PROTONIX) 40 mg tablet Take 40 mg by mouth two (2) times a day. albuterol (PROVENTIL HFA, VENTOLIN HFA, PROAIR HFA) 90 mcg/actuation inhaler Take 2 Puffs by inhalation every four (4) hours as needed for Wheezing.   Qty: 1 Inhaler, Refills: 1    Associated Diagnoses: Acute pulmonary edema (HCC)      calcium acetate,phosphat bind, (PHOSLO) 667 mg cap as directed. NIFEdipine ER (PROCARDIA XL) 60 mg ER tablet Take 1 Tablet by mouth daily. Qty: 90 Tablet, Refills: 2    Associated Diagnoses: Essential hypertension      carvediloL (COREG) 25 mg tablet Take 25 mg by mouth two (2) times daily (with meals). hydrALAZINE (APRESOLINE) 100 mg tablet Take 100 mg by mouth three (3) times daily. furosemide (LASIX) 40 mg tablet Take 40 mg by mouth daily. 2.   Follow-up Information     Follow up With Specialties Details Why Contact Info    Yanet Danielle MD Dermatology Schedule an appointment as soon as possible for a visit in 1 week  Luis   [phone]          3. Return to ED if worse   4. Current Discharge Medication List      START taking these medications    Details   mupirocin calcium (Bactroban) 2 % topical cream Apply  to affected area two (2) times a day. Qty: 15 g, Refills: 0  Start date: 11/22/2021               Diagnosis     Clinical Impression:   1. Skin pimple        Attestations:    Kiet Nichole MD    Please note that this dictation was completed with [employer], the [employer] voice recognition software. Quite often unanticipated grammatical, syntax, homophones, and other interpretive errors are inadvertently transcribed by the computer software. Please disregard these errors. Please excuse any errors that have escaped final proofreading. Thank you.

## 2021-12-07 ENCOUNTER — APPOINTMENT (OUTPATIENT)
Dept: GENERAL RADIOLOGY | Age: 46
End: 2021-12-07
Attending: EMERGENCY MEDICINE
Payer: MEDICARE

## 2021-12-07 ENCOUNTER — HOSPITAL ENCOUNTER (EMERGENCY)
Age: 46
Discharge: HOME | End: 2021-12-07
Attending: EMERGENCY MEDICINE
Payer: MEDICARE

## 2021-12-07 VITALS
HEART RATE: 69 BPM | HEIGHT: 73 IN | RESPIRATION RATE: 18 BRPM | WEIGHT: 211 LBS | OXYGEN SATURATION: 100 % | BODY MASS INDEX: 27.96 KG/M2 | TEMPERATURE: 97.9 F | SYSTOLIC BLOOD PRESSURE: 164 MMHG | DIASTOLIC BLOOD PRESSURE: 90 MMHG

## 2021-12-07 DIAGNOSIS — B96.89 ACUTE BRONCHITIS, BACTERIAL: Primary | ICD-10-CM

## 2021-12-07 DIAGNOSIS — J20.8 ACUTE BRONCHITIS, BACTERIAL: Primary | ICD-10-CM

## 2021-12-07 PROCEDURE — 71045 X-RAY EXAM CHEST 1 VIEW: CPT

## 2021-12-07 PROCEDURE — 99282 EMERGENCY DEPT VISIT SF MDM: CPT

## 2021-12-07 RX ORDER — AZITHROMYCIN 250 MG/1
TABLET, FILM COATED ORAL
Qty: 6 TABLET | Refills: 0 | Status: SHIPPED | OUTPATIENT
Start: 2021-12-07 | End: 2022-09-02

## 2021-12-07 RX ORDER — BENZONATATE 100 MG/1
100 CAPSULE ORAL
Qty: 30 CAPSULE | Refills: 0 | Status: SHIPPED | OUTPATIENT
Start: 2021-12-07 | End: 2022-09-02

## 2021-12-07 RX ORDER — OMEPRAZOLE 40 MG/1
CAPSULE, DELAYED RELEASE ORAL
COMMUNITY

## 2021-12-07 RX ORDER — SEVELAMER CARBONATE 800 MG/1
TABLET, FILM COATED ORAL
COMMUNITY
End: 2022-09-02

## 2021-12-07 NOTE — ED NOTES
HPI: Celia Mccallum is a 55 y.o. male who presents to the ED with complaint of cough and congestion. He says has been sick with a cough for about a week. He is coughing up some yellow phlegm. He denies any fevers. He says he has chronic nasal congestion, but is no different today. He does not have a sore throat. He denies any vomiting or diarrhea. He went to dialysis today and had a normal run. It was recommended that he come to the emergency department by the dialysis nurse due to the cough and wheezing that he seems to be experiencing. He denies any chest pain. ROS:  Constitutional: Negative for fever and chills. HENT: Negative for congestion and sore throat. Eyes: Negative for eye discharge and eye redness. Respiratory: Negative for cough and sputum production. Gastrointestinal: Negative for nausea, vomiting, abdominal pain and diarrhea. Genitourinary: Negative for dysuria, urgency and frequency. Neurological: Negative for dizziness, focal weakness, numbness. Musculoskeletal: Negative for joint swelling and arthralgias. Hematological: Negative for adenopathy. Does not bruise/bleed easily. Psychiatric/Behavioral: Negative for agitation.        Past Medical and Surgical History  Past Medical History:   Diagnosis Date    CAD (coronary artery disease)     MI (12 years ago)    Chronic kidney disease     ESRD x4 years HD Treatment    Hypertension      Past Surgical History:   Procedure Laterality Date    HX ORTHOPAEDIC      left carpal tunnel     HX OTHER SURGICAL      Fistula to L upper arm    HX ROTATOR CUFF REPAIR           Social   Social History     Tobacco Use    Smoking status: Former Smoker     Years: 15.00    Smokeless tobacco: Never Used    Tobacco comment: l   Vaping Use    Vaping Use: Never used   Substance Use Topics    Alcohol use: Not Currently     Alcohol/week: 1.0 - 2.0 standard drink     Types: 1 - 2 Glasses of wine per week     Comment: social     Drug use: Never       Family History  Family History:   Problem Relation Age of Onset    Hypertension Mother     Hypertension Father     Hypertension Maternal Aunt          Medications  No current facility-administered medications on file prior to encounter. Current Outpatient Medications on File Prior to Encounter   Medication Sig Dispense Refill    sevelamer carbonate (Renvela) 800 mg tab tab 1 tablet with meals      omeprazole (PRILOSEC) 40 mg capsule 1 capsule 30 minutes before morning meal      pantoprazole (PROTONIX) 40 mg tablet Take 40 mg by mouth two (2) times a day.  mupirocin calcium (Bactroban) 2 % topical cream Apply  to affected area two (2) times a day. 15 g 0    albuterol (PROVENTIL HFA, VENTOLIN HFA, PROAIR HFA) 90 mcg/actuation inhaler Take 2 Puffs by inhalation every four (4) hours as needed for Wheezing. 1 Inhaler 1    calcium acetate,phosphat bind, (PHOSLO) 667 mg cap as directed.  NIFEdipine ER (PROCARDIA XL) 60 mg ER tablet Take 1 Tablet by mouth daily. 90 Tablet 2    carvediloL (COREG) 25 mg tablet Take 25 mg by mouth two (2) times daily (with meals).  hydrALAZINE (APRESOLINE) 100 mg tablet Take 100 mg by mouth three (3) times daily.  furosemide (LASIX) 40 mg tablet Take 40 mg by mouth daily. Allergies  Allergies   Allergen Reactions    Bactrim [Sulfamethoxazole-Trimethoprim] Rash     Patient had a rash on his penis. PHYSICAL EXAM:    Patient Vitals for the past 8 hrs:   Temp Pulse Resp BP SpO2   12/07/21 1153 -- -- -- -- 100 %   12/07/21 1148 97.9 °F (36.6 °C) 69 18 (!) 164/90 99 %         CONSTITUTIONAL:   Well developed, well nourished. Awake & alert. No acute respiratory distress, non toxic in appearance, not diaphoretic. HEAD:  Normocephalic and atraumatic. EENT:  Pupils are round and reactive, extra-ocular muscles are intact. Sclera are anicteric. Mucous membranes are moist and intact and posterior pharynx is unremarkable.  Patient is speaking clearly, not stridorous or muffled and airway is intact. NECK:   Trachea is midline. No lymphadenopathy. Neck is supple without meningismus. CARDIOVASCULAR:  Regular rate and rhythm, no murmurs, gallps or rubs heard. PULMONARY:   Clear to ausculation bilaterally. No wheezing, rales or rhonchi. Patient is speaking in full sentences without accessory muscle usage. ABDOMINAL:  No tenderness. Soft and non distended. Without hepatosplenomegaly. No pulsatile mass. No rebound, guarding, or rigidity. Overall benign exam.    MUSCULOSKELETAL :  Normal  range of motion. No pedal edema. No tenderness. No cellulitis or evidence of trauma. Dialysis access present to the left forearm. SKIN:  Skin is warm and dry. No diaphoresis, pathological rashes, or lesions. NEUROLOGIC: Alert and awake and appropriately oriented. Labs:  No results found for this or any previous visit (from the past 12 hour(s)). No results found for this or any previous visit (from the past 24 hour(s)). Radiology:     XR CHEST PORT   Final Result      Stable marked cardiomegaly without obvious CHF. No focal infiltrate. *  *   *              Documentation Review:    I have examined the available pertinent past medical records from previous ED visits, hospital admissions, or clinic visits; the information obtained has contributed to PMSHx. The nursing notes for this patient's current visit have been reviewed, and any pertinent information has been incorporated into this note, particularly the vital signs, PMSFSHx, and initial presenting complaint. MEDICAL DECISION MAKING / ED COURSE    C/w lower respiratory tract infection. No evidence of serious process including strep, OM, pneumonia on evaluation today. Will treat patient symptomatically with close follow-up with PMD.  This patient is appropriate for outpatient care. He is not in a high risk group, and has no warning symptoms or signs.       Meds given in ED:  Medications - No data to display    FINAL DIAGNOSIS:    ICD-10-CM ICD-9-CM    1. Acute bronchitis, bacterial  J20.8 466.0     B96.89 041.9          DISPOSITION     Destination: Home    Discharge Instructions:  Please return to the ED for new, different, or worsening symptoms, as well as for any persisting complaints. Read your discharge paperwork carefully and take any prescription medication as directed. Follow up with the healthcare referral provided in the time period specified. In the meantime please contact or return to the ED for any questions or concerns.

## 2021-12-07 NOTE — ED TRIAGE NOTES
Went to dialysis and the nurse heard wheezing. Pt states that he has been coughing up clear mucus. Denies fever or contact with covid.

## 2022-01-12 RX ORDER — LORATADINE 10 MG/1
10 TABLET ORAL
COMMUNITY
End: 2022-09-02

## 2022-01-12 RX ORDER — DOCUSATE SODIUM 100 MG/1
100 CAPSULE, LIQUID FILLED ORAL 2 TIMES DAILY
COMMUNITY
End: 2022-09-02

## 2022-01-12 RX ORDER — SUCRALFATE 1 G/1
1 TABLET ORAL 4 TIMES DAILY
COMMUNITY

## 2022-01-17 ENCOUNTER — HOSPITAL ENCOUNTER (OUTPATIENT)
Dept: PREADMISSION TESTING | Age: 47
Discharge: HOME | End: 2022-01-17
Payer: MEDICARE

## 2022-01-17 PROCEDURE — U0005 INFEC AGEN DETEC AMPLI PROBE: HCPCS

## 2022-01-17 PROCEDURE — 36415 COLL VENOUS BLD VENIPUNCTURE: CPT

## 2022-01-18 LAB
SARS-COV-2, XPLCVT: ABNORMAL
SOURCE, COVRS: ABNORMAL

## 2022-01-20 NOTE — PROGRESS NOTES
Pt up ambulating to bathroom to void. Pt voices no complaints. CBWR. 78M w/ PMHX  HTN, HLD, DM, hypothyroidism, asbestosis, CAD s/p stents x3, Atrial fibrillation on Eliquis, presenting today with c/o numbness to b/l feet. States that he recently fell at home on Thanksgiving and dx with spinal  fx ~2 months ago on outpatient xray and given back brace that he wears "all the time." Reports today that he noticed that his bilateral feet had become numb and this caused him to have difficulty ambulating and he has fallen several times today. States he was scheduled for an appt with a "back doctor today."  MRI T/L Spine- Severe compression fracture of L1 vertebral body w/ retropulsion.  c/o lower back discomfort, voiding urine but feeling of incomplete voiding. denies cp,sob,any weakness but has dull feeling of feet.    Denies saddle anesthesia or worsening of back pain. COVID+ since 01/13/22, pt states he completed 5 days of isolation as well her wife. Patient alert and oriented but confused w/ extensive medical questioning. States he heard back fractures heal on their own and has CHRONIC DISCOMFORT. Pt just voids urine but has some suprapubic discomfort on palpation.    PAST MEDICAL & SURGICAL HISTORY:  Diabetes mellitus  Hypertension  Hyperlipemia  Coronary artery disease  Asbestosis  Heart valve disease  leaky heart valve  Dyspnea on exertion  Urinary frequency  Abnormal chest CT  S/P mastectomy, left  age 12 for benign mass  S/P ORIF (open reduction internal fixation) fracture  right elbow 1962, LILLIANA 3 months later  S/P cholecystectomy  S/P inguinal hernia repair  bilateral  S/P T&amp;A (status post tonsillectomy and adenoidectomy)  as child  S/P angioplasty with stent  2013 4 stents  S/P excision of skin lesion, follow-up exam  benign testicle  S/P colonoscopy  4 years ago, no polyp      FAMILY HISTORY:  Family history of heart attack (Father)  Family history of stroke (Mother)  Family history of diabetes mellitus (Mother, Sibling)  Family history of cardiomegaly (Mother)  Family history of colon cancer (Sibling)  Family history of pancreatic cancer (Sibling)      social history:  Remote smoker,denies any alcohol/illicit drugs uses.

## 2022-02-07 ENCOUNTER — HOSPITAL ENCOUNTER (OUTPATIENT)
Dept: PREADMISSION TESTING | Age: 47
Discharge: HOME | End: 2022-02-07
Payer: MEDICARE

## 2022-02-07 LAB — SARS-COV-2, COV2: NORMAL

## 2022-02-07 PROCEDURE — U0005 INFEC AGEN DETEC AMPLI PROBE: HCPCS

## 2022-02-08 LAB
SARS-COV-2, XPLCVT: NOT DETECTED
SOURCE, COVRS: NORMAL

## 2022-02-11 ENCOUNTER — APPOINTMENT (OUTPATIENT)
Dept: ENDOSCOPY | Age: 47
End: 2022-02-11
Attending: INTERNAL MEDICINE
Payer: MEDICARE

## 2022-02-11 ENCOUNTER — ANESTHESIA EVENT (OUTPATIENT)
Dept: ENDOSCOPY | Age: 47
End: 2022-02-11
Payer: MEDICARE

## 2022-02-11 ENCOUNTER — ANESTHESIA (OUTPATIENT)
Dept: ENDOSCOPY | Age: 47
End: 2022-02-11
Payer: MEDICARE

## 2022-02-11 ENCOUNTER — HOSPITAL ENCOUNTER (OUTPATIENT)
Age: 47
Setting detail: OUTPATIENT SURGERY
Discharge: HOME | End: 2022-02-11
Attending: INTERNAL MEDICINE | Admitting: INTERNAL MEDICINE
Payer: MEDICARE

## 2022-02-11 VITALS
HEART RATE: 89 BPM | SYSTOLIC BLOOD PRESSURE: 143 MMHG | BODY MASS INDEX: 25.36 KG/M2 | DIASTOLIC BLOOD PRESSURE: 84 MMHG | OXYGEN SATURATION: 95 % | WEIGHT: 204 LBS | RESPIRATION RATE: 18 BRPM | HEIGHT: 75 IN | TEMPERATURE: 97.9 F

## 2022-02-11 PROCEDURE — 77030021593 HC FCPS BIOP ENDOSC BSC -A: Performed by: INTERNAL MEDICINE

## 2022-02-11 PROCEDURE — 76060000032 HC ANESTHESIA 0.5 TO 1 HR: Performed by: INTERNAL MEDICINE

## 2022-02-11 PROCEDURE — 77030019988 HC FCPS ENDOSC DISP BSC -B: Performed by: INTERNAL MEDICINE

## 2022-02-11 PROCEDURE — 74011000250 HC RX REV CODE- 250: Performed by: NURSE ANESTHETIST, CERTIFIED REGISTERED

## 2022-02-11 PROCEDURE — 88305 TISSUE EXAM BY PATHOLOGIST: CPT

## 2022-02-11 PROCEDURE — 94640 AIRWAY INHALATION TREATMENT: CPT

## 2022-02-11 PROCEDURE — 74011250636 HC RX REV CODE- 250/636: Performed by: INTERNAL MEDICINE

## 2022-02-11 PROCEDURE — 94760 N-INVAS EAR/PLS OXIMETRY 1: CPT

## 2022-02-11 PROCEDURE — 76040000007: Performed by: INTERNAL MEDICINE

## 2022-02-11 PROCEDURE — 88312 SPECIAL STAINS GROUP 1: CPT

## 2022-02-11 PROCEDURE — 74011250636 HC RX REV CODE- 250/636: Performed by: NURSE ANESTHETIST, CERTIFIED REGISTERED

## 2022-02-11 PROCEDURE — 2709999900 HC NON-CHARGEABLE SUPPLY: Performed by: INTERNAL MEDICINE

## 2022-02-11 PROCEDURE — 74011000250 HC RX REV CODE- 250

## 2022-02-11 RX ORDER — IPRATROPIUM BROMIDE AND ALBUTEROL SULFATE 2.5; .5 MG/3ML; MG/3ML
SOLUTION RESPIRATORY (INHALATION)
Status: COMPLETED
Start: 2022-02-11 | End: 2022-02-11

## 2022-02-11 RX ORDER — PROPOFOL 10 MG/ML
INJECTION, EMULSION INTRAVENOUS AS NEEDED
Status: DISCONTINUED | OUTPATIENT
Start: 2022-02-11 | End: 2022-02-11 | Stop reason: HOSPADM

## 2022-02-11 RX ORDER — ALBUTEROL SULFATE 2.5 MG/.5ML
2.5 SOLUTION RESPIRATORY (INHALATION)
Status: DISCONTINUED | OUTPATIENT
Start: 2022-02-11 | End: 2022-02-11 | Stop reason: HOSPADM

## 2022-02-11 RX ORDER — SODIUM CHLORIDE 9 MG/ML
25 INJECTION, SOLUTION INTRAVENOUS CONTINUOUS
Status: DISCONTINUED | OUTPATIENT
Start: 2022-02-11 | End: 2022-02-11 | Stop reason: HOSPADM

## 2022-02-11 RX ORDER — LIDOCAINE HYDROCHLORIDE 20 MG/ML
INJECTION, SOLUTION EPIDURAL; INFILTRATION; INTRACAUDAL; PERINEURAL AS NEEDED
Status: DISCONTINUED | OUTPATIENT
Start: 2022-02-11 | End: 2022-02-11 | Stop reason: HOSPADM

## 2022-02-11 RX ORDER — GLYCOPYRROLATE 0.2 MG/ML
INJECTION INTRAMUSCULAR; INTRAVENOUS AS NEEDED
Status: DISCONTINUED | OUTPATIENT
Start: 2022-02-11 | End: 2022-02-11 | Stop reason: HOSPADM

## 2022-02-11 RX ORDER — GLYCOPYRROLATE 0.2 MG/ML
INJECTION INTRAMUSCULAR; INTRAVENOUS
Status: DISCONTINUED
Start: 2022-02-11 | End: 2022-02-11 | Stop reason: HOSPADM

## 2022-02-11 RX ORDER — SODIUM CHLORIDE, SODIUM LACTATE, POTASSIUM CHLORIDE, CALCIUM CHLORIDE 600; 310; 30; 20 MG/100ML; MG/100ML; MG/100ML; MG/100ML
INJECTION, SOLUTION INTRAVENOUS
Status: DISCONTINUED | OUTPATIENT
Start: 2022-02-11 | End: 2022-02-11 | Stop reason: HOSPADM

## 2022-02-11 RX ORDER — SODIUM CHLORIDE, SODIUM LACTATE, POTASSIUM CHLORIDE, CALCIUM CHLORIDE 600; 310; 30; 20 MG/100ML; MG/100ML; MG/100ML; MG/100ML
50 INJECTION, SOLUTION INTRAVENOUS CONTINUOUS
Status: DISCONTINUED | OUTPATIENT
Start: 2022-02-11 | End: 2022-02-11 | Stop reason: HOSPADM

## 2022-02-11 RX ADMIN — SODIUM CHLORIDE, POTASSIUM CHLORIDE, SODIUM LACTATE AND CALCIUM CHLORIDE: 600; 310; 30; 20 INJECTION, SOLUTION INTRAVENOUS at 09:30

## 2022-02-11 RX ADMIN — PROPOFOL 100 MG: 10 INJECTION, EMULSION INTRAVENOUS at 09:32

## 2022-02-11 RX ADMIN — PROPOFOL 80 MG: 10 INJECTION, EMULSION INTRAVENOUS at 09:37

## 2022-02-11 RX ADMIN — GLYCOPYRROLATE 0.2 MG: 0.2 INJECTION, SOLUTION INTRAMUSCULAR; INTRAVENOUS at 09:30

## 2022-02-11 RX ADMIN — IPRATROPIUM BROMIDE AND ALBUTEROL SULFATE 3 ML: .5; 2.5 SOLUTION RESPIRATORY (INHALATION) at 08:40

## 2022-02-11 RX ADMIN — LIDOCAINE HYDROCHLORIDE 60 MG: 20 INJECTION, SOLUTION EPIDURAL; INFILTRATION; INTRACAUDAL; PERINEURAL at 09:32

## 2022-02-11 RX ADMIN — SODIUM CHLORIDE 25 ML/HR: 9 INJECTION, SOLUTION INTRAVENOUS at 07:51

## 2022-02-11 NOTE — ANESTHESIA POSTPROCEDURE EVALUATION
Procedure(s):  ESOPHAGOGASTRODUODENOSCOPY (EGD).     MAC    Anesthesia Post Evaluation      Multimodal analgesia: multimodal analgesia used between 6 hours prior to anesthesia start to PACU discharge  Patient location during evaluation: bedside  Patient participation: complete - patient cannot participate  Level of consciousness: lethargic  Pain score: 0  Pain management: adequate  Airway patency: patent  Anesthetic complications: no  Cardiovascular status: acceptable  Respiratory status: acceptable  Hydration status: acceptable  Post anesthesia nausea and vomiting:  none  Final Post Anesthesia Temperature Assessment:  Normothermia (36.0-37.5 degrees C)      INITIAL Post-op Vital signs:   Vitals Value Taken Time   /72 02/11/22 0943   Temp 36 °C (96.8 °F) 02/11/22 0943   Pulse 81 02/11/22 0943   Resp 18 02/11/22 0943   SpO2 97 % 02/11/22 0943

## 2022-02-11 NOTE — DISCHARGE INSTRUCTIONS
Avoid citrus juices, cigarettes, chocolate, tight fitting clothing, coffee, and other caffeinated beverages, carbonated beverages, fatty and fried foods, anticholinergic medications, medications which decrease LES tone. Avoid aspirin, anacin, bufferin, christo seltzer or any medication containing aspirin for 1 week. If needed, you may take tylenol or datril. Continue present PPI therapy.

## 2022-02-11 NOTE — PROGRESS NOTES
Patient discharged per physicians order. IV removed. Vital signs stable on room air. Discharge instructions reviewed with patient, patient verbalized understanding. Patient wheeled down to front hospital entrance with family member present to transport patient home.

## 2022-02-11 NOTE — ANESTHESIA PREPROCEDURE EVALUATION
Relevant Problems   CARDIOVASCULAR   (+) HTN (hypertension)   (+) Hypertension      RENAL FAILURE   (+) ESRD (end stage renal disease) on dialysis (HCC)      HEMATOLOGY   (+) Acute blood loss anemia   (+) Anemia       Anesthetic History   No history of anesthetic complications            Review of Systems / Medical History  Patient summary reviewed, nursing notes reviewed and pertinent labs reviewed    Pulmonary                Comments: Hx of bronchospasm. Neuro/Psych   Within defined limits           Cardiovascular    Hypertension: well controlled          CAD    Exercise tolerance: >4 METS  Comments: ECHO (05/2021)  EF 69%, GRADE II DIASTOLIC DYSFUNCTION, SEVERE DIASTOLIC DYSFUNCTION. GI/Hepatic/Renal     GERD    Renal disease (Last dialysis yesterday. ): ESRD and dialysis  PUD    Comments: Gastric ulcer . DARK STOOLS. ANEMIA.   Endo/Other        Obesity and anemia     Other Findings            Physical Exam    Airway  Mallampati: I  TM Distance: < 4 cm  Neck ROM: normal range of motion   Mouth opening: Normal     Cardiovascular    Rhythm: regular  Rate: normal         Dental    Dentition: Lower dentition intact and Upper dentition intact     Pulmonary        Wheezes (Scent wheezing )         Abdominal  Abdominal exam normal       Other Findings            Anesthetic Plan    ASA: 3  Anesthesia type: MAC          Induction: Intravenous  Anesthetic plan and risks discussed with: Patient

## 2022-03-14 ENCOUNTER — TRANSCRIBE ORDER (OUTPATIENT)
Dept: REGISTRATION | Age: 47
End: 2022-03-14

## 2022-03-14 ENCOUNTER — HOSPITAL ENCOUNTER (OUTPATIENT)
Age: 47
Setting detail: OUTPATIENT SURGERY
Discharge: HOME | End: 2022-03-14
Attending: INTERNAL MEDICINE | Admitting: INTERNAL MEDICINE
Payer: MEDICARE

## 2022-03-14 ENCOUNTER — APPOINTMENT (OUTPATIENT)
Dept: LAB | Age: 47
End: 2022-03-14
Attending: INTERNAL MEDICINE
Payer: MEDICARE

## 2022-03-14 DIAGNOSIS — D64.9 ANEMIA, UNSPECIFIED: Primary | ICD-10-CM

## 2022-03-14 DIAGNOSIS — D64.9 ANEMIA, UNSPECIFIED: ICD-10-CM

## 2022-03-14 LAB — FOLATE SERPL-MCNC: 6.2 NG/ML (ref 5–21)

## 2022-03-14 PROCEDURE — 83540 ASSAY OF IRON: CPT

## 2022-03-14 PROCEDURE — 36415 COLL VENOUS BLD VENIPUNCTURE: CPT

## 2022-03-14 PROCEDURE — 82746 ASSAY OF FOLIC ACID SERUM: CPT

## 2022-03-14 PROCEDURE — 83550 IRON BINDING TEST: CPT

## 2022-03-14 PROCEDURE — 76040000019: Performed by: INTERNAL MEDICINE

## 2022-03-14 PROCEDURE — 77030041695 HC CAPSULE ENDO OCOA -E: Performed by: INTERNAL MEDICINE

## 2022-03-14 NOTE — PERIOP NOTES
Per pt, last solid meal was 5pm yesterday 3/13/22  And NPO at midnight. Dr Annel Balderas made aware of not clear liquid diet day before procedure. Ok to proceed with pill cam today.

## 2022-03-15 LAB
IRON SATN MFR SERPL: 13 % (ref 15–55)
IRON,IRN: 33 UG/DL (ref 38–169)
TIBC SERPL-MCNC: 248 UG/DL (ref 250–450)
UIBC SERPL-MCNC: 215 UG/DL (ref 111–343)

## 2022-03-18 PROBLEM — E87.5 HYPERKALEMIA: Status: ACTIVE | Noted: 2021-08-10

## 2022-03-18 PROBLEM — I10 HYPERTENSION: Status: ACTIVE | Noted: 2021-08-10

## 2022-03-19 PROBLEM — Z99.2 ESRD (END STAGE RENAL DISEASE) ON DIALYSIS (HCC): Status: ACTIVE | Noted: 2020-12-29

## 2022-03-19 PROBLEM — R19.7 DIARRHEA: Status: ACTIVE | Noted: 2020-12-29

## 2022-03-19 PROBLEM — L08.9 INFECTION OF SCALP: Status: ACTIVE | Noted: 2021-06-17

## 2022-03-19 PROBLEM — J98.01 BRONCHOSPASM: Status: ACTIVE | Noted: 2021-08-10

## 2022-03-19 PROBLEM — N18.6 ESRD (END STAGE RENAL DISEASE) ON DIALYSIS (HCC): Status: ACTIVE | Noted: 2020-12-29

## 2022-03-19 PROBLEM — D64.9 ANEMIA: Status: ACTIVE | Noted: 2020-12-29

## 2022-03-19 PROBLEM — D23.4 BENIGN NEOPLASM OF SCALP AND SKIN OF NECK: Status: ACTIVE | Noted: 2021-07-07

## 2022-03-19 PROBLEM — E87.5 ACUTE HYPERKALEMIA: Status: ACTIVE | Noted: 2020-12-29

## 2022-03-19 PROBLEM — I10 HTN (HYPERTENSION): Status: ACTIVE | Noted: 2020-12-29

## 2022-03-19 PROBLEM — D62 ACUTE BLOOD LOSS ANEMIA: Status: ACTIVE | Noted: 2021-08-28

## 2022-03-20 PROBLEM — J81.1 PULMONARY EDEMA: Status: ACTIVE | Noted: 2021-08-10

## 2022-04-11 RX ORDER — FUROSEMIDE 40 MG/1
40 TABLET ORAL DAILY
Qty: 90 TABLET | Refills: 2 | OUTPATIENT
Start: 2022-04-11

## 2022-09-02 ENCOUNTER — HOSPITAL ENCOUNTER (EMERGENCY)
Age: 47
Discharge: HOME | End: 2022-09-02
Attending: INTERNAL MEDICINE | Admitting: INTERNAL MEDICINE
Payer: MEDICARE

## 2022-09-02 VITALS
TEMPERATURE: 99 F | WEIGHT: 195 LBS | DIASTOLIC BLOOD PRESSURE: 92 MMHG | RESPIRATION RATE: 16 BRPM | BODY MASS INDEX: 25.84 KG/M2 | HEART RATE: 67 BPM | HEIGHT: 73 IN | OXYGEN SATURATION: 96 % | SYSTOLIC BLOOD PRESSURE: 154 MMHG

## 2022-09-02 DIAGNOSIS — R23.8 SKIN PIMPLE: Primary | ICD-10-CM

## 2022-09-02 PROCEDURE — 99283 EMERGENCY DEPT VISIT LOW MDM: CPT | Performed by: INTERNAL MEDICINE

## 2022-09-02 RX ORDER — CEPHALEXIN 500 MG/1
500 CAPSULE ORAL 2 TIMES DAILY
Qty: 14 CAPSULE | Refills: 0 | Status: SHIPPED | OUTPATIENT
Start: 2022-09-02 | End: 2022-09-09

## 2022-09-02 NOTE — ED PROVIDER NOTES
EMERGENCY DEPARTMENT HISTORY AND PHYSICAL EXAM      Date: 9/2/2022  Patient Name: Rose Nolasco    History of Presenting Illness     Chief Complaint   Patient presents with    Lip Swelling       History Provided By: Patient    HPI: Rose Nolasco, 52 y.o. male with h/o HTN; CAD; ESRD on HD; GERD; gastric ulcer; anemia; Pulmonary Edema; Hydradenitis that presents with a pimple on his left upper lip vermillion border reed the has had for the past 2 days. He pinched it and drainded some pus out of it but then the lip became swollen. There are no other complaints, changes, or physical findings at this time. PCP: Silvino Clayton MD    No current facility-administered medications on file prior to encounter. Current Outpatient Medications on File Prior to Encounter   Medication Sig Dispense Refill    sucralfate (CARAFATE) 1 gram tablet Take 1 g by mouth four (4) times daily. omeprazole (PRILOSEC) 40 mg capsule 1 capsule 30 minutes before morning meal      pantoprazole (PROTONIX) 40 mg tablet Take 40 mg by mouth two (2) times a day. albuterol (PROVENTIL HFA, VENTOLIN HFA, PROAIR HFA) 90 mcg/actuation inhaler Take 2 Puffs by inhalation every four (4) hours as needed for Wheezing. 1 Inhaler 1    NIFEdipine ER (PROCARDIA XL) 60 mg ER tablet Take 1 Tablet by mouth daily. 90 Tablet 2    carvediloL (COREG) 25 mg tablet Take 25 mg by mouth two (2) times daily (with meals). hydrALAZINE (APRESOLINE) 100 mg tablet Take 100 mg by mouth three (3) times daily. furosemide (LASIX) 40 mg tablet Take 40 mg by mouth daily. [DISCONTINUED] loratadine (Claritin) 10 mg tablet Take 10 mg by mouth. [DISCONTINUED] docusate sodium (COLACE) 100 mg capsule Take 100 mg by mouth two (2) times a day. [DISCONTINUED] alpha-d-galactosidase (Beano) 300 unit TbDi Take  by mouth.       [DISCONTINUED] sevelamer carbonate (Renvela) 800 mg tab tab 1 tablet with meals      [DISCONTINUED] benzonatate (Tessalon Perles) 100 mg capsule Take 1 Capsule by mouth three (3) times daily as needed for Cough. (Patient not taking: Reported on 2/11/2022) 30 Capsule 0    [DISCONTINUED] azithromycin (Zithromax Z-Yossi) 250 mg tablet 500 mg day #1, 250 mg d# 2-5 (Patient not taking: Reported on 2/11/2022) 6 Tablet 0    [DISCONTINUED] mupirocin calcium (Bactroban) 2 % topical cream Apply  to affected area two (2) times a day. (Patient not taking: Reported on 2/11/2022) 15 g 0       Past History     Past Medical History:  Past Medical History:   Diagnosis Date    Anemia     CAD (coronary artery disease)     MI (12 years ago)    Chronic kidney disease     ESRD x4 years HD Treatment    GERD (gastroesophageal reflux disease)     Hx of gastric ulcer 10/2020    Hypertension        Past Surgical History:  Past Surgical History:   Procedure Laterality Date    HX COLONOSCOPY  2021    \"negative\"    HX ORTHOPAEDIC      left carpal tunnel     HX OTHER SURGICAL      Fistula to L upper arm    HX ROTATOR CUFF REPAIR         Family History:  Family History   Problem Relation Age of Onset    Hypertension Mother     Hypertension Father     Hypertension Maternal Aunt        Social History:  Social History     Tobacco Use    Smoking status: Former     Years: 15.00     Types: Cigarettes    Smokeless tobacco: Never    Tobacco comments:     l   Vaping Use    Vaping Use: Never used   Substance Use Topics    Alcohol use: Not Currently     Alcohol/week: 1.0 - 2.0 standard drink     Types: 1 - 2 Glasses of wine per week     Comment: social     Drug use: Never       Allergies: Allergies   Allergen Reactions    Bactrim [Sulfamethoxazole-Trimethoprim] Rash     Patient had a rash on his penis. Review of Systems   Review of Systems   Constitutional:  Negative for chills and fever. HENT:  Negative for sore throat. Eyes:  Negative for redness. Respiratory:  Negative for shortness of breath. Cardiovascular:  Negative for chest pain.    Gastrointestinal:  Negative for abdominal pain. Skin:  Positive for rash. Physical Exam   Physical Exam  Vitals and nursing note reviewed. Constitutional:       Appearance: Normal appearance. HENT:      Head: Normocephalic. Mouth/Throat:      Comments: Swollen pimple and lip on the left upper vermilion border  Pulmonary:      Effort: Pulmonary effort is normal.   Skin:     General: Skin is warm. Neurological:      Mental Status: He is oriented to person, place, and time. Lab and Diagnostic Study Results   Labs -   No results found for this or any previous visit (from the past 12 hour(s)). Radiologic Studies -   @lastxrresult@  CT Results  (Last 48 hours)      None          CXR Results  (Last 48 hours)      None            Medical Decision Making and ED Course   Differential Diagnosis & Medical Decision Making Provider Note:       - I am the first provider for this patient. I reviewed the vital signs, available nursing notes, past medical history, past surgical history, family history and social history. The patients presenting problems have been discussed, and they are in agreement with the care plan formulated and outlined with them. I have encouraged them to ask questions as they arise throughout their visit. Vital Signs-Reviewed the patient's vital signs. Patient Vitals for the past 12 hrs:   Temp Pulse Resp BP SpO2   09/02/22 1010 99 °F (37.2 °C) 67 16 (!) 154/92 96 %       ED Course:          Procedures       Disposition   Disposition: Discharge    DISCHARGE PLAN:  1. Current Discharge Medication List        CONTINUE these medications which have NOT CHANGED    Details   sucralfate (CARAFATE) 1 gram tablet Take 1 g by mouth four (4) times daily. omeprazole (PRILOSEC) 40 mg capsule 1 capsule 30 minutes before morning meal      pantoprazole (PROTONIX) 40 mg tablet Take 40 mg by mouth two (2) times a day.       albuterol (PROVENTIL HFA, VENTOLIN HFA, PROAIR HFA) 90 mcg/actuation inhaler Take 2 Puffs by inhalation every four (4) hours as needed for Wheezing. Qty: 1 Inhaler, Refills: 1    Associated Diagnoses: Acute pulmonary edema (HCC)      NIFEdipine ER (PROCARDIA XL) 60 mg ER tablet Take 1 Tablet by mouth daily. Qty: 90 Tablet, Refills: 2    Associated Diagnoses: Essential hypertension      carvediloL (COREG) 25 mg tablet Take 25 mg by mouth two (2) times daily (with meals). hydrALAZINE (APRESOLINE) 100 mg tablet Take 100 mg by mouth three (3) times daily. furosemide (LASIX) 40 mg tablet Take 40 mg by mouth daily. 2.   Follow-up Information       Follow up With Specialties Details Why Contact Info    Neelima Mccallum MD Family Medicine Schedule an appointment as soon as possible for a visit in 1 week  [address]  [phone]            3. Return to ED if worse   4. Current Discharge Medication List        START taking these medications    Details   cephALEXin (Keflex) 500 mg capsule Take 1 Capsule by mouth two (2) times a day for 7 days. Qty: 14 Capsule, Refills: 0  Start date: 9/2/2022, End date: 9/9/2022               Diagnosis/Clinical Impression     Clinical Impression:   1. Skin pimple    Left Upper Lip    Attestations: Candy Salgado MD, am the primary clinician of record. Please note that this dictation was completed with [employer], the [employer] voice recognition software. Quite often unanticipated grammatical, syntax, homophones, and other interpretive errors are inadvertently transcribed by the computer software. Please disregard these errors. Please excuse any errors that have escaped final proofreading. Thank you.

## 2022-09-02 NOTE — ED TRIAGE NOTES
Pt states he started with some swelling in his upper lip yesterday, states when he woke up this am it was worse. Pt denies any new products, foods, or meds.   Pt has not taken any benadryl

## 2022-09-27 ENCOUNTER — TRANSCRIBE ORDER (OUTPATIENT)
Dept: SCHEDULING | Age: 47
End: 2022-09-27

## 2022-09-27 DIAGNOSIS — I25.10 CAD (CORONARY ARTERY DISEASE): Primary | ICD-10-CM

## 2022-10-05 ENCOUNTER — HOSPITAL ENCOUNTER (OUTPATIENT)
Dept: NON INVASIVE DIAGNOSTICS | Age: 47
Discharge: HOME | End: 2022-10-05
Attending: NURSE PRACTITIONER
Payer: MEDICARE

## 2022-10-05 VITALS
DIASTOLIC BLOOD PRESSURE: 92 MMHG | SYSTOLIC BLOOD PRESSURE: 166 MMHG | BODY MASS INDEX: 25.84 KG/M2 | HEIGHT: 73 IN | WEIGHT: 195 LBS

## 2022-10-05 DIAGNOSIS — I25.10 CAD (CORONARY ARTERY DISEASE): ICD-10-CM

## 2022-10-05 LAB
ECHO AO ROOT DIAM: 3.7 CM
ECHO AO ROOT INDEX: 1.74 CM/M2
ECHO EST RA PRESSURE: 8 MMHG
ECHO IVC PROX: 2.5 CM
ECHO LA DIAMETER INDEX: 2.25 CM/M2
ECHO LA DIAMETER: 4.8 CM
ECHO LA TO AORTIC ROOT RATIO: 1.3
ECHO LA VOL 2C: 188 ML (ref 18–58)
ECHO LA VOL 4C: 132 ML (ref 18–58)
ECHO LA VOL BP: 159 ML (ref 18–58)
ECHO LA VOL/BSA BIPLANE: 75 ML/M2 (ref 16–34)
ECHO LA VOLUME AREA LENGTH: 163 ML
ECHO LA VOLUME INDEX A2C: 88 ML/M2 (ref 16–34)
ECHO LA VOLUME INDEX A4C: 62 ML/M2 (ref 16–34)
ECHO LA VOLUME INDEX AREA LENGTH: 77 ML/M2 (ref 16–34)
ECHO LV E' LATERAL VELOCITY: 7 CM/S
ECHO LV E' SEPTAL VELOCITY: 8 CM/S
ECHO LV EDV A2C: 200 ML
ECHO LV EDV A4C: 180 ML
ECHO LV EDV BP: 189 ML (ref 67–155)
ECHO LV EDV INDEX A4C: 85 ML/M2
ECHO LV EDV INDEX BP: 89 ML/M2
ECHO LV EDV NDEX A2C: 94 ML/M2
ECHO LV EJECTION FRACTION A2C: 63 %
ECHO LV EJECTION FRACTION A4C: 60 %
ECHO LV EJECTION FRACTION BIPLANE: 61 % (ref 55–100)
ECHO LV ESV A2C: 75 ML
ECHO LV ESV A4C: 72 ML
ECHO LV ESV BP: 74 ML (ref 22–58)
ECHO LV ESV INDEX A2C: 35 ML/M2
ECHO LV ESV INDEX A4C: 34 ML/M2
ECHO LV ESV INDEX BP: 35 ML/M2
ECHO LV FRACTIONAL SHORTENING: 34 % (ref 28–44)
ECHO LV GLOBAL LONGITUDINAL STRAIN (GLS): -12.7 %
ECHO LV INTERNAL DIMENSION DIASTOLE INDEX: 2.72 CM/M2
ECHO LV INTERNAL DIMENSION DIASTOLIC: 5.8 CM (ref 4.2–5.9)
ECHO LV INTERNAL DIMENSION SYSTOLIC INDEX: 1.78 CM/M2
ECHO LV INTERNAL DIMENSION SYSTOLIC: 3.8 CM
ECHO LV IVSD: 1.9 CM (ref 0.6–1)
ECHO LV MASS 2D: 597.6 G (ref 88–224)
ECHO LV MASS INDEX 2D: 280.6 G/M2 (ref 49–115)
ECHO LV POSTERIOR WALL DIASTOLIC: 2 CM (ref 0.6–1)
ECHO LV RELATIVE WALL THICKNESS RATIO: 0.69
ECHO LVOT AREA: 4.9 CM2
ECHO LVOT DIAM: 2.5 CM
ECHO MAIN PULMONARY ARTERY DIAMETER: 3 CM
ECHO MV A VELOCITY: 0.7 M/S
ECHO MV AREA PHT: 5.8 CM2
ECHO MV E DECELERATION TIME (DT): 177.9 MS
ECHO MV E VELOCITY: 1.03 M/S
ECHO MV E/A RATIO: 1.47
ECHO MV E/E' LATERAL: 14.71
ECHO MV E/E' RATIO (AVERAGED): 13.79
ECHO MV E/E' SEPTAL: 12.88
ECHO MV MAX VELOCITY: 1.4 M/S
ECHO MV MEAN GRADIENT: 3 MMHG
ECHO MV MEAN VELOCITY: 0.7 M/S
ECHO MV PEAK GRADIENT: 8 MMHG
ECHO MV PRESSURE HALF TIME (PHT): 37.9 MS
ECHO MV VTI: 26.9 CM
ECHO PULMONARY ARTERY END DIASTOLIC PRESSURE: 9 MMHG
ECHO PV MAX VELOCITY: 1.2 M/S
ECHO PV PEAK GRADIENT: 5 MMHG
ECHO PV REGURGITANT MAX VELOCITY: 1.5 M/S
ECHO RA END SYSTOLIC VOLUME APICAL 4 CHAMBER INDEX BSA: 51 ML/M2
ECHO RA VOLUME BIPLANE METHOD OF DISKS: 108 ML
ECHO RA VOLUME INDEX BP: 51 ML/M2
ECHO RA VOLUME: 108 ML
ECHO RIGHT VENTRICULAR SYSTOLIC PRESSURE (RVSP): 53 MMHG
ECHO RV BASAL DIMENSION: 5.6 CM
ECHO RV LONGITUDINAL DIMENSION: 10.2 CM
ECHO RV MID DIMENSION: 3.8 CM
ECHO RV TAPSE: 3.3 CM (ref 1.7–?)
ECHO TV REGURGITANT MAX VELOCITY: 3.34 M/S
ECHO TV REGURGITANT PEAK GRADIENT: 45 MMHG

## 2022-10-05 PROCEDURE — 93306 TTE W/DOPPLER COMPLETE: CPT

## 2023-03-22 ENCOUNTER — APPOINTMENT (OUTPATIENT)
Age: 48
End: 2023-03-22
Payer: MEDICARE

## 2023-03-22 ENCOUNTER — HOSPITAL ENCOUNTER (OUTPATIENT)
Age: 48
Setting detail: OBSERVATION
Discharge: HOME | End: 2023-03-23
Attending: EMERGENCY MEDICINE | Admitting: INTERNAL MEDICINE
Payer: MEDICARE

## 2023-03-22 DIAGNOSIS — E87.5 HYPERKALEMIA: Primary | ICD-10-CM

## 2023-03-22 DIAGNOSIS — Z91.158 NONCOMPLIANCE OF PATIENT WITH RENAL DIALYSIS: ICD-10-CM

## 2023-03-22 LAB
ANION GAP SERPL CALC-SCNC: 12 MMOL/L (ref 3–18)
BUN SERPL-MCNC: 87 MG/DL (ref 7–18)
BUN/CREAT SERPL: 5 (ref 12–20)
CA-I BLD-MCNC: 9 MG/DL (ref 8.5–10.1)
CHLORIDE SERPL-SCNC: 100 MMOL/L (ref 100–111)
CO2 SERPL-SCNC: 22 MMOL/L (ref 21–32)
CREAT SERPL-MCNC: 17.7 MG/DL (ref 0.6–1.3)
EKG ATRIAL RATE: 64 BPM
EKG DIAGNOSIS: NORMAL
EKG P AXIS: 43 DEGREES
EKG P-R INTERVAL: 202 MS
EKG Q-T INTERVAL: 414 MS
EKG QRS DURATION: 104 MS
EKG QTC CALCULATION (BAZETT): 424 MS
EKG R AXIS: -50 DEGREES
EKG T AXIS: 120 DEGREES
EKG VENTRICULAR RATE: 63 BPM
ERYTHROCYTE [DISTWIDTH] IN BLOOD BY AUTOMATED COUNT: 17.9 % (ref 11.6–14.5)
GLUCOSE SERPL-MCNC: 83 MG/DL (ref 74–99)
HCT VFR BLD AUTO: 43.1 % (ref 36–48)
HGB BLD-MCNC: 13.3 G/DL (ref 13–16)
MAGNESIUM SERPL-MCNC: 2.7 MG/DL (ref 1.6–2.6)
MCH RBC QN AUTO: 27.3 PG (ref 24–34)
MCHC RBC AUTO-ENTMCNC: 30.9 G/DL (ref 31–37)
MCV RBC AUTO: 88.3 FL (ref 78–100)
NRBC # BLD: 0 K/UL (ref 0–0.01)
NRBC BLD-RTO: 0 PER 100 WBC
PLATELET # BLD AUTO: 163 K/UL (ref 135–420)
PMV BLD AUTO: 10 FL (ref 9.2–11.8)
POTASSIUM SERPL-SCNC: 6.1 MMOL/L (ref 3.5–5.5)
POTASSIUM SERPL-SCNC: 7.3 MMOL/L (ref 3.5–5.5)
RBC # BLD AUTO: 4.88 M/UL (ref 4.35–5.65)
SODIUM SERPL-SCNC: 134 MMOL/L (ref 136–145)
WBC # BLD AUTO: 2.6 K/UL (ref 4.6–13.2)

## 2023-03-22 PROCEDURE — 96375 TX/PRO/DX INJ NEW DRUG ADDON: CPT

## 2023-03-22 PROCEDURE — 93005 ELECTROCARDIOGRAM TRACING: CPT | Performed by: EMERGENCY MEDICINE

## 2023-03-22 PROCEDURE — 80048 BASIC METABOLIC PNL TOTAL CA: CPT

## 2023-03-22 PROCEDURE — 83735 ASSAY OF MAGNESIUM: CPT

## 2023-03-22 PROCEDURE — 99285 EMERGENCY DEPT VISIT HI MDM: CPT

## 2023-03-22 PROCEDURE — 6360000002 HC RX W HCPCS: Performed by: EMERGENCY MEDICINE

## 2023-03-22 PROCEDURE — 85027 COMPLETE CBC AUTOMATED: CPT

## 2023-03-22 PROCEDURE — 87340 HEPATITIS B SURFACE AG IA: CPT

## 2023-03-22 PROCEDURE — 6370000000 HC RX 637 (ALT 250 FOR IP): Performed by: INTERNAL MEDICINE

## 2023-03-22 PROCEDURE — 6370000000 HC RX 637 (ALT 250 FOR IP): Performed by: EMERGENCY MEDICINE

## 2023-03-22 PROCEDURE — 2580000003 HC RX 258: Performed by: EMERGENCY MEDICINE

## 2023-03-22 PROCEDURE — 86706 HEP B SURFACE ANTIBODY: CPT

## 2023-03-22 PROCEDURE — 71046 X-RAY EXAM CHEST 2 VIEWS: CPT

## 2023-03-22 PROCEDURE — 96374 THER/PROPH/DIAG INJ IV PUSH: CPT

## 2023-03-22 RX ORDER — SODIUM POLYSTYRENE SULFONATE 15 G/60ML
30 SUSPENSION ORAL; RECTAL
Status: COMPLETED | OUTPATIENT
Start: 2023-03-22 | End: 2023-03-22

## 2023-03-22 RX ORDER — SODIUM POLYSTYRENE SULFONATE 15 G/60ML
45 SUSPENSION ORAL; RECTAL ONCE
Status: COMPLETED | OUTPATIENT
Start: 2023-03-22 | End: 2023-03-22

## 2023-03-22 RX ADMIN — INSULIN HUMAN 10 UNITS: 100 INJECTION, SOLUTION PARENTERAL at 18:39

## 2023-03-22 RX ADMIN — CALCIUM GLUCONATE 1000 MG: 98 INJECTION, SOLUTION INTRAVENOUS at 18:34

## 2023-03-22 RX ADMIN — SODIUM POLYSTYRENE SULFONATE 45 G: 15 SUSPENSION ORAL; RECTAL at 22:43

## 2023-03-22 RX ADMIN — SODIUM POLYSTYRENE SULFONATE 30 G: 15 SUSPENSION ORAL; RECTAL at 19:05

## 2023-03-22 ASSESSMENT — PAIN - FUNCTIONAL ASSESSMENT: PAIN_FUNCTIONAL_ASSESSMENT: NONE - DENIES PAIN

## 2023-03-22 ASSESSMENT — LIFESTYLE VARIABLES
HOW MANY STANDARD DRINKS CONTAINING ALCOHOL DO YOU HAVE ON A TYPICAL DAY: PATIENT DOES NOT DRINK
HOW OFTEN DO YOU HAVE A DRINK CONTAINING ALCOHOL: NEVER

## 2023-03-22 ASSESSMENT — ENCOUNTER SYMPTOMS
ABDOMINAL PAIN: 1
COUGH: 1
SHORTNESS OF BREATH: 1
DIARRHEA: 1

## 2023-03-22 NOTE — ED TRIAGE NOTES
States that he has missed 2 HD treatments because \" I dont like who's working, I dont let just anyone stick me\".

## 2023-03-22 NOTE — ED PROVIDER NOTES
EMERGENCY DEPARTMENT HISTORY AND PHYSICAL EXAM      Patient Name: Kati Cervantes  MRN: [id number]  YOB: 1975  Provider: Prince Valerio MD  PCP: Soheila Segovia MD     History of Presenting Illness     Chief Complaint   Patient presents with    Other       History Provided By: Patient     HPI: Kati Cervantes, 50 y.o. male  presents to the ED with cc of cough and diarrhea. Patient states today he began with a mild cough and has been having loose stools. No nausea or vomiting. Patient states he does feel malaised and fatigued. No fevers or chills. No URI symptoms. No urinary complaints. He does have a history of end-stage renal disease on dialysis Tuesday Thursday Saturday. Patient has not been dialyzed since last Saturday which was 5 days ago. Patient states he has missed his last 2 dialysis dates because he does not like who is working there.     Past History     Past Medical History:  Past Medical History:   Diagnosis Date    Anemia     CAD (coronary artery disease)     MI (12 years ago)    Chronic kidney disease     ESRD x4 years HD Treatment    GERD (gastroesophageal reflux disease)     Hx of gastric ulcer 10/2020    Hypertension        Past Surgical History:  Past Surgical History:   Procedure Laterality Date    COLONOSCOPY  2021    \"negative\"    ORTHOPEDIC SURGERY      left carpal tunnel     OTHER SURGICAL HISTORY      Fistula to L upper arm    ROTATOR CUFF REPAIR         Medications:  Current Facility-Administered Medications   Medication Dose Route Frequency Provider Last Rate Last Admin    sodium chloride flush 0.9 % injection 5-40 mL  5-40 mL IntraVENous 2 times per day Julissa Lick, FNP        sodium chloride flush 0.9 % injection 5-40 mL  5-40 mL IntraVENous PRN Julissa Lick, FNP        0.9 % sodium chloride infusion   IntraVENous PRN Julissa Lick, FNP        heparin (porcine) injection 5,000 Units  5,000 Units SubCUTAneous 3 times per day Julissa Lick, FNP pertinent to today's complaints, if available in our medical record system. I have also reviewed all labs and imaging results from previous results in comparison to results obtained today. If an EKG was obtained today, it has been compared to previous EKGs, if available. If arriving via EMS, the EMS report has been reviewed if made available to us within the patient's time in the emergency department. MDM  Number of Diagnoses or Management Options  Hyperkalemia  Noncompliance of patient with renal dialysis  Diagnosis management comments: Patient presents with fatigue, cough, mild diarrhea as well as nausea and has not been dialyzed for 5 days. He is not hypoxic or in respiratory distress. He does still produce urine. No cardiac dysrhythmias noted on monitoring. Labs were obtained and did return with a potassium of 7.3. He is not very symptomatic. Consulted with nephrology and will treat with IV and p.o. meds. We will recheck potassium about 2 hours after meds and if able to get below 6 we will admit overnight with dialysis in the morning. If he becomes more symptomatic or potassium is not able to be decreased will require emergent dialysis tonight which will result in transfer to another facility. Sign out received from Dr. Debbie Joiner, awaiting repeat K per Nephrology. Repeated 6.1, spoke with [facility] Hospitalist, Austyn Good, who wanted another K since patient was given an additional dose of kayexelate, resulted at 6.2 Spoke with Dr. Daryl Hill, who wanted the patient to receive another dose of insulin and glucose, would like the patient admitted to the Hospitalist Service, and will give the patient dialysis in the morning.  Spoke with [facility] Hospitlaist, who accepts the patient for admit-obs       Amount and/or Complexity of Data Reviewed  Clinical lab tests: ordered and reviewed  Tests in the radiology section of CPT®: ordered and reviewed  Discussion of test results with the performing providers:

## 2023-03-23 VITALS
TEMPERATURE: 97.8 F | HEART RATE: 85 BPM | RESPIRATION RATE: 18 BRPM | WEIGHT: 198 LBS | BODY MASS INDEX: 24.62 KG/M2 | SYSTOLIC BLOOD PRESSURE: 162 MMHG | HEIGHT: 75 IN | DIASTOLIC BLOOD PRESSURE: 97 MMHG | OXYGEN SATURATION: 97 %

## 2023-03-23 PROBLEM — Z99.2 ESRD (END STAGE RENAL DISEASE) ON DIALYSIS (HCC): Status: ACTIVE | Noted: 2020-12-29

## 2023-03-23 PROBLEM — I10 HYPERTENSION: Status: ACTIVE | Noted: 2021-08-10

## 2023-03-23 PROBLEM — E87.5 HYPERKALEMIA: Status: ACTIVE | Noted: 2021-08-10

## 2023-03-23 PROBLEM — N18.6 ESRD (END STAGE RENAL DISEASE) ON DIALYSIS (HCC): Status: ACTIVE | Noted: 2020-12-29

## 2023-03-23 LAB
ALBUMIN SERPL-MCNC: 3.4 G/DL (ref 3.4–5)
ANION GAP SERPL CALC-SCNC: 15 MMOL/L (ref 3–18)
BUN SERPL-MCNC: 90 MG/DL (ref 7–18)
BUN/CREAT SERPL: 5 (ref 12–20)
CA-I BLD-MCNC: 8.3 MG/DL (ref 8.5–10.1)
CHLORIDE SERPL-SCNC: 100 MMOL/L (ref 100–111)
CO2 SERPL-SCNC: 24 MMOL/L (ref 21–32)
CREAT SERPL-MCNC: 18.5 MG/DL (ref 0.6–1.3)
GLUCOSE SERPL-MCNC: 89 MG/DL (ref 74–99)
HBV SURFACE AB SER QL: REACTIVE
HBV SURFACE AB SER-ACNC: 163.29 MIU/ML
HBV SURFACE AG SER QL: <0.1 INDEX
HBV SURFACE AG SER QL: NEGATIVE
PHOSPHATE SERPL-MCNC: 6.6 MG/DL (ref 2.5–4.9)
POTASSIUM SERPL-SCNC: 5.5 MMOL/L (ref 3.5–5.5)
POTASSIUM SERPL-SCNC: 6.2 MMOL/L (ref 3.5–5.5)
SODIUM SERPL-SCNC: 139 MMOL/L (ref 136–145)

## 2023-03-23 PROCEDURE — 36556 INSERT NON-TUNNEL CV CATH: CPT

## 2023-03-23 PROCEDURE — G0378 HOSPITAL OBSERVATION PER HR: HCPCS

## 2023-03-23 PROCEDURE — 6370000000 HC RX 637 (ALT 250 FOR IP): Performed by: NURSE PRACTITIONER

## 2023-03-23 PROCEDURE — 2580000003 HC RX 258: Performed by: INTERNAL MEDICINE

## 2023-03-23 PROCEDURE — 2580000003 HC RX 258: Performed by: FAMILY MEDICINE

## 2023-03-23 PROCEDURE — 6370000000 HC RX 637 (ALT 250 FOR IP): Performed by: FAMILY MEDICINE

## 2023-03-23 PROCEDURE — 80069 RENAL FUNCTION PANEL: CPT

## 2023-03-23 PROCEDURE — 6360000002 HC RX W HCPCS: Performed by: INTERNAL MEDICINE

## 2023-03-23 PROCEDURE — 2580000003 HC RX 258: Performed by: NURSE PRACTITIONER

## 2023-03-23 RX ORDER — ACETAMINOPHEN 325 MG/1
650 TABLET ORAL EVERY 6 HOURS PRN
Status: DISCONTINUED | OUTPATIENT
Start: 2023-03-23 | End: 2023-03-23 | Stop reason: HOSPADM

## 2023-03-23 RX ORDER — NIFEDIPINE 30 MG/1
60 TABLET, FILM COATED, EXTENDED RELEASE ORAL 2 TIMES DAILY
COMMUNITY

## 2023-03-23 RX ORDER — ONDANSETRON 4 MG/1
4 TABLET, ORALLY DISINTEGRATING ORAL EVERY 8 HOURS PRN
Status: DISCONTINUED | OUTPATIENT
Start: 2023-03-23 | End: 2023-03-23 | Stop reason: HOSPADM

## 2023-03-23 RX ORDER — ALBUTEROL SULFATE 90 UG/1
2 AEROSOL, METERED RESPIRATORY (INHALATION) EVERY 4 HOURS PRN
Status: DISCONTINUED | OUTPATIENT
Start: 2023-03-23 | End: 2023-03-23 | Stop reason: HOSPADM

## 2023-03-23 RX ORDER — HYDRALAZINE HYDROCHLORIDE 25 MG/1
100 TABLET, FILM COATED ORAL 3 TIMES DAILY
Status: DISCONTINUED | OUTPATIENT
Start: 2023-03-23 | End: 2023-03-23 | Stop reason: HOSPADM

## 2023-03-23 RX ORDER — POLYETHYLENE GLYCOL 3350 17 G/17G
17 POWDER, FOR SOLUTION ORAL DAILY PRN
Status: DISCONTINUED | OUTPATIENT
Start: 2023-03-23 | End: 2023-03-23 | Stop reason: HOSPADM

## 2023-03-23 RX ORDER — ONDANSETRON 2 MG/ML
4 INJECTION INTRAMUSCULAR; INTRAVENOUS EVERY 6 HOURS PRN
Status: DISCONTINUED | OUTPATIENT
Start: 2023-03-23 | End: 2023-03-23 | Stop reason: HOSPADM

## 2023-03-23 RX ORDER — SODIUM CHLORIDE 9 MG/ML
INJECTION, SOLUTION INTRAVENOUS PRN
Status: DISCONTINUED | OUTPATIENT
Start: 2023-03-23 | End: 2023-03-23 | Stop reason: HOSPADM

## 2023-03-23 RX ORDER — HEPARIN SODIUM 5000 [USP'U]/ML
5000 INJECTION, SOLUTION INTRAVENOUS; SUBCUTANEOUS EVERY 8 HOURS SCHEDULED
Status: DISCONTINUED | OUTPATIENT
Start: 2023-03-23 | End: 2023-03-23 | Stop reason: HOSPADM

## 2023-03-23 RX ORDER — HYDRALAZINE HYDROCHLORIDE 20 MG/ML
20 INJECTION INTRAMUSCULAR; INTRAVENOUS EVERY 6 HOURS PRN
Status: DISCONTINUED | OUTPATIENT
Start: 2023-03-23 | End: 2023-03-23 | Stop reason: HOSPADM

## 2023-03-23 RX ORDER — SODIUM CHLORIDE 0.9 % (FLUSH) 0.9 %
5-40 SYRINGE (ML) INJECTION PRN
Status: DISCONTINUED | OUTPATIENT
Start: 2023-03-23 | End: 2023-03-23 | Stop reason: HOSPADM

## 2023-03-23 RX ORDER — SODIUM CHLORIDE 0.9 % (FLUSH) 0.9 %
5-40 SYRINGE (ML) INJECTION EVERY 12 HOURS SCHEDULED
Status: DISCONTINUED | OUTPATIENT
Start: 2023-03-23 | End: 2023-03-23 | Stop reason: HOSPADM

## 2023-03-23 RX ORDER — ACETAMINOPHEN 650 MG/1
650 SUPPOSITORY RECTAL EVERY 6 HOURS PRN
Status: DISCONTINUED | OUTPATIENT
Start: 2023-03-23 | End: 2023-03-23 | Stop reason: HOSPADM

## 2023-03-23 RX ORDER — CARVEDILOL 12.5 MG/1
25 TABLET ORAL 2 TIMES DAILY WITH MEALS
Status: DISCONTINUED | OUTPATIENT
Start: 2023-03-23 | End: 2023-03-23 | Stop reason: HOSPADM

## 2023-03-23 RX ORDER — SODIUM CHLORIDE 9 MG/ML
INJECTION, SOLUTION INTRAVENOUS
Status: COMPLETED | OUTPATIENT
Start: 2023-03-23 | End: 2023-03-23

## 2023-03-23 RX ADMIN — HYDRALAZINE HYDROCHLORIDE 100 MG: 25 TABLET, FILM COATED ORAL at 09:54

## 2023-03-23 RX ADMIN — SODIUM CHLORIDE 1 ML: 9 INJECTION, SOLUTION INTRAVENOUS at 07:45

## 2023-03-23 RX ADMIN — SODIUM CHLORIDE 2000 ML: 9 INJECTION, SOLUTION INTRAVENOUS at 07:50

## 2023-03-23 RX ADMIN — CARVEDILOL 25 MG: 12.5 TABLET, FILM COATED ORAL at 09:54

## 2023-03-23 RX ADMIN — INSULIN HUMAN 10 UNITS: 100 INJECTION, SOLUTION PARENTERAL at 01:59

## 2023-03-23 RX ADMIN — DEXTROSE MONOHYDRATE 250 ML: 100 INJECTION, SOLUTION INTRAVENOUS at 01:57

## 2023-03-23 RX ADMIN — SODIUM CHLORIDE, PRESERVATIVE FREE 10 ML: 5 INJECTION INTRAVENOUS at 09:54

## 2023-03-23 RX ADMIN — HYDRALAZINE HYDROCHLORIDE 20 MG: 20 INJECTION INTRAMUSCULAR; INTRAVENOUS at 10:36

## 2023-03-23 ASSESSMENT — ENCOUNTER SYMPTOMS
CHEST TIGHTNESS: 0
SHORTNESS OF BREATH: 1
ABDOMINAL PAIN: 0
DIARRHEA: 1

## 2023-03-23 NOTE — PROGRESS NOTES
Labs reviewed   Hyperkalemia treated medically   K 7.2->5.5  HD orders entered in [address] with Anastasiia Feliz dialysis nurse on call

## 2023-03-23 NOTE — ED NOTES
Ambulatory to restroom steady gait, aware of plan to admit.       Maria Eugenia Dean RN  03/23/23 8081

## 2023-03-23 NOTE — CARE COORDINATION
Purchasing Medications:    Meds-to-Beds request:        [facility] [address], [address] [address] [address] [address]  Phone: [phone] Fax: [phone]      Notes:    Factors facilitating achievement of predicted outcomes:      Barriers to discharge: Medical complications    Additional Case Management Notes:   RUR: N/A pt is OBS:  Plan of care includes medication reconciliation to be complete, education will be given with teach back method, and will identify need for post-acute care follow up. Patient centered discharge planning to ensure smooth transition to community and [facility]. The Plan for Transition of Care is related to the following treatment goals of Hyperkalemia [T53.3]    IF APPLICABLE: The Patient and/or patient representative Dru Faith and his family were provided with a choice of provider and agrees with the discharge plan. Freedom of choice list with basic dialogue that supports the patient's individualized plan of care/goals and shares the quality data associated with the providers was provided to:     Patient Representative Name:       The Patient and/or Patient Representative Agree with the Discharge Plan?       Kirti Shannon  Case Management Department  Ph: [phone] Fax:

## 2023-03-23 NOTE — ED NOTES
TRANSFER - OUT REPORT:    Verbal report given to [facility] on Ruth Lal  being transferred to [address] [place] [address]  for routine progression of patient care       Report consisted of patient's Situation, Background, Assessment and   Recommendations(SBAR). Information from the following report(s) Nurse Handoff Report, ED Encounter Summary, ED SBAR, and [facility] was reviewed with the receiving nurse. [place] Assessment: No data recorded  Lines:       Opportunity for questions and clarification was provided.       Patient transported with:  Monitor and 91 Ric Rocha RN  03/23/23 4290

## 2023-03-23 NOTE — PROGRESS NOTES
3449 Received care of pt via stretcher from ER to room 241. Pt alert and oriented and ambulatory. Routine admission completed and sandwich and fruit cup and gingerale provided. Call bell in reach.

## 2023-03-23 NOTE — PROGRESS NOTES
9555 Received care of pt via stretcher from ER to room 241. Pt alert and oriented and ambulatory. Routine admission completed and sandwich and fruit cup and gingerale provided. Call bell in reach.

## 2023-03-23 NOTE — ASSESSMENT & PLAN NOTE
This is a chronic problem  Patient missed Tuesday dialysis  Patient states does not go if the people he does not like are at the center  Dr. Jessica Hernandez consulted by ER physician potassium 6.2 okay to admit for dialysis in the morning

## 2023-03-23 NOTE — DIALYSIS
No pain, no distress, no complaints, no SOB, LUE AVF, bruit and thrill noted, disinfect per P and P, cannulate 15 gg needles, no difficulty, rested and watched tv much of treatment, post treatment , all possible blood returned, hemostasis / primary nurses in to check on patient multiple times, rounding / avf bruit and thrill noted / Dr Isadora Parra rounding during treatment as well / pre report from Davidson Conley and post report with Kristen Sinha LPN

## 2023-03-23 NOTE — ASSESSMENT & PLAN NOTE
Potassium 7.3 in the ED patient was given multiple medications and has come down to 6.2  Dr. Robles Hills consulted by the emergency doctor and they discussed his potassium and the nephrologist said to admit for dialysis in the morning  Admit to observation telemetry

## 2023-03-23 NOTE — PROGRESS NOTES
6153- Received care of pt from off going nurse. Pt off the floor at dialysis. 6721- Rounded to dialysis room. Dialysis nurse stated that treatment was going well. Pt has no needs at this time. 9289- Pt back on floor from dialysis. Breakfast tray heated up and scheduled meds given. 1015- BP elevated. MD made aware orders received. 1036- Prn Hydralazine given. 1115- BP stable. Discharge instructions given. IV removed. Tele off. Pt wheeled down stairs with brother picking him up. Pt educated on the importance of going to his dialysis appt.

## 2023-03-23 NOTE — ED NOTES
Received care of patient from previous shift. Resting quietly at this time. Lab  in to draw repeat Potassium level, disposition dependent on level. No pain at present time.  No other concerns voiced     Derrick Gilman RN  03/23/23 7009

## 2023-03-23 NOTE — DISCHARGE SUMMARY
Discharge Summary       PATIENT ID: Epifanio Brock  MRN: [id number]   YOB: 1975    DATE OF ADMISSION: 3/22/2023  4:57 PM    DATE OF DISCHARGE: 03/23/23    PRIMARY CARE PROVIDER: Jamaal Good MD     ATTENDING PHYSICIAN: Pro Jamison MD  DISCHARGING PROVIDER: Pro Jamison MD        CONSULTATIONS: IP CONSULT TO NEPHROLOGY    PROCEDURES/SURGERIES: * No surgery found *    ADMITTING [facility] COURSE:   Epifanio Brock is a 50 y.o. male  has a past medical history of Anemia, CAD (coronary artery disease), Chronic kidney disease, GERD (gastroesophageal reflux disease), Hx of gastric ulcer, and Hypertension. Patient seen at bedside in emergency department. Patient originally came to the emergency department for diarrhea some shortness of breath. Patient states he missed 2 dialysis cycles due to the fact that the person that was working at the dialysis center he did not like he did not want them sticking his fistula. Patient was found to be hyperkalemic and received multiple medications and potassium has since come down to 6.2. Dr. Gauri Dallas was consulted twice in the emergency department and originally had wanted the potassium below 6 but excepted 6.2 and patient will be admitted on observation status for dialysis today. A.m. labs ordered telemetry due to the hyperkalemia.         DISCHARGE DIAGNOSES / PLAN:      Assessment & Plan:         Hyperkalemia  Potassium 7.3 in the ED patient was given multiple medications and has come down to 6.2  Dr. Gauri Dallas consulted by the emergency doctor and they discussed his potassium and the nephrologist said to admit for dialysis in the morning  Admit to observation telemetry        ESRD (end stage renal disease) on dialysis [facility])  This is a chronic problem  Patient missed Tuesday dialysis  Patient states does not go if the people he does not like are at the center  Dr. Gauri Dallas consulted by ER physician potassium 6.2 okay to admit for dialysis in the MEDICAL DIAGNOSES:      Greater than 45 minutes were spent with the patient on counseling and coordination of care    Signed:   Janis Butts MD  3/23/2023  10:54 AM

## 2023-03-23 NOTE — ED NOTES
Dr Louanne Apgar speaking with Dr Doyle Brizuela at this time via telephone     Eduardo Wilson, NOEL  [phone]

## 2023-03-23 NOTE — CONSULTS
mg at 03/23/23 0954    hydrALAZINE (APRESOLINE) tablet 100 mg  100 mg Oral TID [place], FNP   100 mg at 03/23/23 0954    sodium chloride flush 0.9 % injection 5-40 mL  5-40 mL IntraVENous 2 times per day Belem [place], FNP   10 mL at 03/23/23 0954    sodium chloride flush 0.9 % injection 5-40 mL  5-40 mL IntraVENous PRN Belem [place], FNP        0.9 % sodium chloride infusion   IntraVENous PRN Belem [place], FNP 1 mL/hr at 03/23/23 0745 1 mL at 03/23/23 0745    heparin (porcine) injection 5,000 Units  5,000 Units SubCUTAneous 3 times per day Belem [place], FNP        ondansetron (ZOFRAN-ODT) disintegrating tablet 4 mg  4 mg Oral Q8H PRN Belem [place], FNP        Or    ondans[facility]ron [facility]) injection 4 mg  4 mg IntraVENous Q6H PRN Belem [place], FNP        polyethylene glycol (GLYCOLAX) packet 17 g  17 g Oral Daily PRN Belem [place], FNP        acetaminophen (TYLENOL) tablet 650 mg  650 mg Oral Q6H PRN Belem [place], FNP        Or    acetaminophen (TYLENOL) suppository 650 mg  650 mg Rectal Q6H PRN Belem [place], FNP            Allergies   Allergen Reactions    Sulfamethoxazole-Trimethoprim Rash     Patient had a rash on his penis. Review of Systems:  A comprehensive review of systems was negative except for that written in the History of Present Illness. Objective:     Vitals:    03/23/23 0840 03/23/23 0855 03/23/23 0910 03/23/23 0925   BP: (!) 169/102 (!) 172/104 (!) 178/110 (!) 180/112   Pulse: 62 64 70 63   Resp:    18   Temp:    97.9 °F (36.6 °C)   TempSrc:       SpO2:       Weight:       Height:            Physical Exam:  General: NAD  Eyes: sclera anicteric  Oral Cavity: No thrush or ulcers  Neck: no JVD  Chest: Fair bilateral air entry  Heart: normal sounds  Abdomen: soft and non tender   : no greene  Lower Extremities: no edema  Skin: no rash  Neuro: intact  Psychiatric: non-depressed            Assessment:     [unfilled]    Plan:     1 ESRD. -On hemodialysis Tuesday Thursday Saturday at [address] in [place].    -He missed 2 dialysis sessions.    -He came with K of 7.2. No significant volume overload.    -He is getting dialyzed for 4 hours with goal UF of 3.5 L.    -He has left upper arm AV fistula is a dialysis access. -From renal standpoint okay to discharge after the dialysis. 2.  Severe hyperkalemia.    -He came with potassium of 7.3.    -Etiology missed dialysis sessions and dietary noncompliance. -He was medically treated including Kayexalate given in the ER. -Repeat K is 5.5 this morning.    -Plan to dialyze with 1K for the first hour and 2K for the rest of the treatment. 3.  Hypertension.    -Blood pressure is okay. -Continue carvedilol 25 mg twice daily and hydralazine 100-minute 3 times a day. 4.  Renal osteodystrophy.    -His calcium is okay. His phosphorus is elevated.    -He is not on any phosphorus binders at home. 5.  DVT prophylaxis. Agree with unfractioned subcu heparin. Thank you for the consultation.     Signed By: Kristen Downing MD     March 23, 2023

## 2023-03-23 NOTE — H&P
History and Physical      Subjective:     Gill Rios is a 50 y.o. male  has a past medical history of Anemia, CAD (coronary artery disease), Chronic kidney disease, GERD (gastroesophageal reflux disease), Hx of gastric ulcer, and Hypertension. Patient seen at bedside in emergency department. Patient originally came to the emergency department for diarrhea some shortness of breath. Patient states he missed 2 dialysis cycles due to the fact that the person that was working at the dialysis center he did not like he did not want them sticking his fistula. Patient was found to be hyperkalemic and received multiple medications and potassium has since come down to 6.2. Dr. Jessica Hernandez was consulted twice in the emergency department and originally had wanted the potassium below 6 but excepted 6.2 and patient will be admitted on observation status for dialysis today. A.m. labs ordered telemetry due to the hyperkalemia. Past Medical History:   Diagnosis Date    Anemia     CAD (coronary artery disease)     MI (12 years ago)    Chronic kidney disease     ESRD x4 years HD Treatment    GERD (gastroesophageal reflux disease)     Hx of gastric ulcer 10/2020    Hypertension       Past Surgical History:   Procedure Laterality Date    COLONOSCOPY  2021    \"negative\"    ORTHOPEDIC SURGERY      left carpal tunnel     OTHER SURGICAL HISTORY      Fistula to L upper arm    ROTATOR CUFF REPAIR       Family History   Problem Relation Age of Onset    Hypertension Maternal Aunt     Hypertension Father     Hypertension Mother       Social History     Tobacco Use    Smoking status: Former    Smokeless tobacco: Never    Tobacco comments:     Quit smoking: l   Substance Use Topics    Alcohol use: Not Currently     Alcohol/week: 1.0 - 2.0 standard drink       Prior to Admission medications    Medication Sig Start Date End Date Taking?  Authorizing Provider   NIFEdipine (ADALAT CC) 30 MG extended release tablet Take 60 mg by

## 2023-03-28 LAB
EKG ATRIAL RATE: 64 BPM
EKG DIAGNOSIS: NORMAL
EKG P AXIS: 43 DEGREES
EKG P-R INTERVAL: 202 MS
EKG Q-T INTERVAL: 414 MS
EKG QRS DURATION: 104 MS
EKG QTC CALCULATION (BAZETT): 424 MS
EKG R AXIS: -50 DEGREES
EKG T AXIS: 120 DEGREES
EKG VENTRICULAR RATE: 63 BPM

## 2023-07-19 ENCOUNTER — APPOINTMENT (OUTPATIENT)
Age: 48
End: 2023-07-19
Payer: MEDICARE

## 2023-07-19 ENCOUNTER — HOSPITAL ENCOUNTER (OUTPATIENT)
Age: 48
Setting detail: OBSERVATION
Discharge: HOME | End: 2023-07-21
Attending: FAMILY MEDICINE | Admitting: INTERNAL MEDICINE
Payer: MEDICARE

## 2023-07-19 DIAGNOSIS — R07.9 CHEST PAIN, UNSPECIFIED TYPE: Primary | ICD-10-CM

## 2023-07-19 DIAGNOSIS — R07.89 OTHER CHEST PAIN: ICD-10-CM

## 2023-07-19 LAB
ALBUMIN SERPL-MCNC: 3.7 G/DL (ref 3.4–5)
ALBUMIN/GLOB SERPL: 0.8 (ref 0.8–1.7)
ALP SERPL-CCNC: 152 U/L (ref 45–117)
ALT SERPL-CCNC: 9 U/L (ref 16–61)
ANION GAP SERPL CALC-SCNC: 9 MMOL/L (ref 3–18)
AST SERPL W P-5'-P-CCNC: 10 U/L (ref 10–38)
BASOPHILS # BLD: 0 K/UL (ref 0–0.1)
BASOPHILS NFR BLD: 0 % (ref 0–2)
BILIRUB SERPL-MCNC: 0.4 MG/DL (ref 0.2–1)
BNP SERPL-MCNC: ABNORMAL PG/ML (ref 0–450)
BUN SERPL-MCNC: 53 MG/DL (ref 7–18)
BUN/CREAT SERPL: 4 (ref 12–20)
CA-I BLD-MCNC: 8.6 MG/DL (ref 8.5–10.1)
CHLORIDE SERPL-SCNC: 95 MMOL/L (ref 100–111)
CO2 SERPL-SCNC: 35 MMOL/L (ref 21–32)
CREAT SERPL-MCNC: 13.2 MG/DL (ref 0.6–1.3)
D DIMER PPP FEU-MCNC: 2.42 UG/ML(FEU)
DIFFERENTIAL METHOD BLD: ABNORMAL
EOSINOPHIL # BLD: 0 K/UL (ref 0–0.4)
EOSINOPHIL NFR BLD: 0 % (ref 0–5)
ERYTHROCYTE [DISTWIDTH] IN BLOOD BY AUTOMATED COUNT: 15.8 % (ref 11.6–14.5)
GLOBULIN SER CALC-MCNC: 4.4 G/DL (ref 2–4)
GLUCOSE SERPL-MCNC: 95 MG/DL (ref 74–99)
HCT VFR BLD AUTO: 35.5 % (ref 36–48)
HGB BLD-MCNC: 11.2 G/DL (ref 13–16)
IMM GRANULOCYTES # BLD AUTO: 0 K/UL (ref 0–0.04)
IMM GRANULOCYTES NFR BLD AUTO: 0 % (ref 0–0.5)
LIPASE SERPL-CCNC: 198 U/L (ref 73–393)
LYMPHOCYTES # BLD: 0.9 K/UL (ref 0.9–3.6)
LYMPHOCYTES NFR BLD: 17 % (ref 21–52)
MAGNESIUM SERPL-MCNC: 2.4 MG/DL (ref 1.6–2.6)
MCH RBC QN AUTO: 27.7 PG (ref 24–34)
MCHC RBC AUTO-ENTMCNC: 31.5 G/DL (ref 31–37)
MCV RBC AUTO: 87.9 FL (ref 78–100)
MONOCYTES # BLD: 0.5 K/UL (ref 0.05–1.2)
MONOCYTES NFR BLD: 9 % (ref 3–10)
NEUTS SEG # BLD: 3.8 K/UL (ref 1.8–8)
NEUTS SEG NFR BLD: 74 % (ref 40–73)
NRBC # BLD: 0 K/UL (ref 0–0.01)
NRBC BLD-RTO: 0 PER 100 WBC
PLATELET # BLD AUTO: 177 K/UL (ref 135–420)
PMV BLD AUTO: 11.7 FL (ref 9.2–11.8)
POTASSIUM SERPL-SCNC: 4.6 MMOL/L (ref 3.5–5.5)
PROT SERPL-MCNC: 8.1 G/DL (ref 6.4–8.2)
RBC # BLD AUTO: 4.04 M/UL (ref 4.35–5.65)
SODIUM SERPL-SCNC: 139 MMOL/L (ref 136–145)
TROPONIN I SERPL HS-MCNC: 90 NG/L (ref 0–78)
TROPONIN I SERPL HS-MCNC: 91 NG/L (ref 0–78)
WBC # BLD AUTO: 5.2 K/UL (ref 4.6–13.2)

## 2023-07-19 PROCEDURE — 87340 HEPATITIS B SURFACE AG IA: CPT

## 2023-07-19 PROCEDURE — 6360000002 HC RX W HCPCS: Performed by: FAMILY MEDICINE

## 2023-07-19 PROCEDURE — 83735 ASSAY OF MAGNESIUM: CPT

## 2023-07-19 PROCEDURE — 99285 EMERGENCY DEPT VISIT HI MDM: CPT

## 2023-07-19 PROCEDURE — 85025 COMPLETE CBC W/AUTO DIFF WBC: CPT

## 2023-07-19 PROCEDURE — 36415 COLL VENOUS BLD VENIPUNCTURE: CPT

## 2023-07-19 PROCEDURE — 96374 THER/PROPH/DIAG INJ IV PUSH: CPT

## 2023-07-19 PROCEDURE — 87635 SARS-COV-2 COVID-19 AMP PRB: CPT

## 2023-07-19 PROCEDURE — 84484 ASSAY OF TROPONIN QUANT: CPT

## 2023-07-19 PROCEDURE — 85379 FIBRIN DEGRADATION QUANT: CPT

## 2023-07-19 PROCEDURE — G0378 HOSPITAL OBSERVATION PER HR: HCPCS

## 2023-07-19 PROCEDURE — 86706 HEP B SURFACE ANTIBODY: CPT

## 2023-07-19 PROCEDURE — 83880 ASSAY OF NATRIURETIC PEPTIDE: CPT

## 2023-07-19 PROCEDURE — 83690 ASSAY OF LIPASE: CPT

## 2023-07-19 PROCEDURE — 80053 COMPREHEN METABOLIC PANEL: CPT

## 2023-07-19 PROCEDURE — 93005 ELECTROCARDIOGRAM TRACING: CPT | Performed by: FAMILY MEDICINE

## 2023-07-19 PROCEDURE — 71045 X-RAY EXAM CHEST 1 VIEW: CPT

## 2023-07-19 RX ORDER — SEVELAMER CARBONATE 800 MG/1
1 TABLET, FILM COATED ORAL
COMMUNITY

## 2023-07-19 RX ORDER — HYDRALAZINE HYDROCHLORIDE 20 MG/ML
10 INJECTION INTRAMUSCULAR; INTRAVENOUS
Status: COMPLETED | OUTPATIENT
Start: 2023-07-19 | End: 2023-07-19

## 2023-07-19 RX ADMIN — HYDRALAZINE HYDROCHLORIDE 10 MG: 20 INJECTION INTRAMUSCULAR; INTRAVENOUS at 23:11

## 2023-07-19 ASSESSMENT — PAIN - FUNCTIONAL ASSESSMENT: PAIN_FUNCTIONAL_ASSESSMENT: 0-10

## 2023-07-19 ASSESSMENT — HEART SCORE: ECG: 1

## 2023-07-19 ASSESSMENT — PAIN SCALES - GENERAL: PAINLEVEL_OUTOF10: 6

## 2023-07-19 NOTE — ED TRIAGE NOTES
Pt ambulatory to room with steady gait, states right side chest pain since Monday after eating. TRS HD, full round on Tuesday, states pain worse on deep inspiration.

## 2023-07-20 ENCOUNTER — APPOINTMENT (OUTPATIENT)
Age: 48
End: 2023-07-20
Payer: MEDICARE

## 2023-07-20 PROBLEM — I10 HTN (HYPERTENSION): Status: ACTIVE | Noted: 2020-12-29

## 2023-07-20 LAB
ANION GAP SERPL CALC-SCNC: 10 MMOL/L (ref 3–18)
BASOPHILS # BLD: 0 K/UL (ref 0–0.1)
BASOPHILS NFR BLD: 0 % (ref 0–2)
BUN SERPL-MCNC: 61 MG/DL (ref 7–18)
BUN/CREAT SERPL: 4 (ref 12–20)
CA-I BLD-MCNC: 8.5 MG/DL (ref 8.5–10.1)
CHLORIDE SERPL-SCNC: 96 MMOL/L (ref 100–111)
CO2 SERPL-SCNC: 34 MMOL/L (ref 21–32)
CREAT SERPL-MCNC: 14.1 MG/DL (ref 0.6–1.3)
DIFFERENTIAL METHOD BLD: ABNORMAL
ECHO AO ASC DIAM: 4 CM
ECHO AO ASCENDING AORTA INDEX: 1.84 CM/M2
ECHO AO ROOT DIAM: 4.2 CM
ECHO AO ROOT INDEX: 1.94 CM/M2
ECHO AV AREA PEAK VELOCITY: 3.1 CM2
ECHO AV AREA VTI: 3.8 CM2
ECHO AV AREA/BSA PEAK VELOCITY: 1.4 CM2/M2
ECHO AV AREA/BSA VTI: 1.8 CM2/M2
ECHO AV MEAN GRADIENT: 6 MMHG
ECHO AV MEAN VELOCITY: 1.2 M/S
ECHO AV PEAK GRADIENT: 13 MMHG
ECHO AV PEAK VELOCITY: 1.8 M/S
ECHO AV VELOCITY RATIO: 0.67
ECHO AV VTI: 31.6 CM
ECHO BSA: 2.19 M2
ECHO IVC PROX: 2.3 CM
ECHO LA AREA 2C: 34.5 CM2
ECHO LA AREA 4C: 30 CM2
ECHO LA MAJOR AXIS: 7 CM
ECHO LA MINOR AXIS: 7 CM
ECHO LA VOL 2C: 142 ML (ref 18–58)
ECHO LA VOL 4C: 107 ML (ref 18–58)
ECHO LA VOL BP: 123 ML (ref 18–58)
ECHO LA VOL/BSA BIPLANE: 57 ML/M2 (ref 16–34)
ECHO LA VOLUME INDEX A2C: 65 ML/M2 (ref 16–34)
ECHO LA VOLUME INDEX A4C: 49 ML/M2 (ref 16–34)
ECHO LV E' LATERAL VELOCITY: 9 CM/S
ECHO LV E' SEPTAL VELOCITY: 6 CM/S
ECHO LV EDV A2C: 98 ML
ECHO LV EDV A4C: 152 ML
ECHO LV EDV INDEX A4C: 70 ML/M2
ECHO LV EDV NDEX A2C: 45 ML/M2
ECHO LV EJECTION FRACTION A2C: 62 %
ECHO LV EJECTION FRACTION A4C: 65 %
ECHO LV EJECTION FRACTION BIPLANE: 63 % (ref 55–100)
ECHO LV ESV A2C: 37 ML
ECHO LV ESV A4C: 54 ML
ECHO LV ESV INDEX A2C: 17 ML/M2
ECHO LV ESV INDEX A4C: 25 ML/M2
ECHO LV FRACTIONAL SHORTENING: 35 % (ref 28–44)
ECHO LV GLOBAL LONGITUDINAL STRAIN (GLS): -9.3 %
ECHO LV INTERNAL DIMENSION DIASTOLE INDEX: 2.76 CM/M2
ECHO LV INTERNAL DIMENSION DIASTOLIC: 6 CM (ref 4.2–5.9)
ECHO LV INTERNAL DIMENSION SYSTOLIC INDEX: 1.8 CM/M2
ECHO LV INTERNAL DIMENSION SYSTOLIC: 3.9 CM
ECHO LV IVSD: 1.6 CM (ref 0.6–1)
ECHO LV MASS 2D: 468.8 G (ref 88–224)
ECHO LV MASS INDEX 2D: 216 G/M2 (ref 49–115)
ECHO LV POSTERIOR WALL DIASTOLIC: 1.6 CM (ref 0.6–1)
ECHO LV RELATIVE WALL THICKNESS RATIO: 0.53
ECHO LVOT AREA: 4.5 CM2
ECHO LVOT AV VTI INDEX: 0.84
ECHO LVOT DIAM: 2.4 CM
ECHO LVOT MEAN GRADIENT: 3 MMHG
ECHO LVOT PEAK GRADIENT: 6 MMHG
ECHO LVOT PEAK VELOCITY: 1.2 M/S
ECHO LVOT STROKE VOLUME INDEX: 55.2 ML/M2
ECHO LVOT SV: 119.8 ML
ECHO LVOT VTI: 26.5 CM
ECHO MV A VELOCITY: 0.76 M/S
ECHO MV AREA VTI: 5.1 CM2
ECHO MV E DECELERATION TIME (DT): 206 MS
ECHO MV E VELOCITY: 0.78 M/S
ECHO MV E/A RATIO: 1.03
ECHO MV E/E' LATERAL: 8.67
ECHO MV E/E' RATIO (AVERAGED): 10.83
ECHO MV E/E' SEPTAL: 13
ECHO MV LVOT VTI INDEX: 0.89
ECHO MV MAX VELOCITY: 1 M/S
ECHO MV MEAN GRADIENT: 2 MMHG
ECHO MV MEAN VELOCITY: 0.7 M/S
ECHO MV PEAK GRADIENT: 4 MMHG
ECHO MV VTI: 23.6 CM
ECHO PV MAX VELOCITY: 1.1 M/S
ECHO PV PEAK GRADIENT: 5 MMHG
ECHO RA AREA 4C: 29.2 CM2
ECHO RA END SYSTOLIC VOLUME APICAL 4 CHAMBER INDEX BSA: 52 ML/M2
ECHO RA VOLUME: 113 ML
ECHO RV BASAL DIMENSION: 4.6 CM
ECHO RV GLOBAL SYSTOLIC STRAIN (GLS): -17.6 %
ECHO RV LONGITUDINAL DIMENSION: 8.7 CM
ECHO RV MID DIMENSION: 3.2 CM
ECHO RV TAPSE: 1.6 CM (ref 1.7–?)
ECHO TV REGURGITANT MAX VELOCITY: 1.78 M/S
ECHO TV REGURGITANT PEAK GRADIENT: 13 MMHG
EKG ATRIAL RATE: 75 BPM
EKG DIAGNOSIS: NORMAL
EKG P AXIS: 36 DEGREES
EKG P-R INTERVAL: 184 MS
EKG Q-T INTERVAL: 398 MS
EKG QRS DURATION: 92 MS
EKG QTC CALCULATION (BAZETT): 444 MS
EKG R AXIS: -42 DEGREES
EKG T AXIS: 129 DEGREES
EKG VENTRICULAR RATE: 75 BPM
EOSINOPHIL # BLD: 0 K/UL (ref 0–0.4)
EOSINOPHIL NFR BLD: 0 % (ref 0–5)
ERYTHROCYTE [DISTWIDTH] IN BLOOD BY AUTOMATED COUNT: 15.7 % (ref 11.6–14.5)
GLUCOSE SERPL-MCNC: 77 MG/DL (ref 74–99)
HCT VFR BLD AUTO: 34 % (ref 36–48)
HGB BLD-MCNC: 10.4 G/DL (ref 13–16)
IMM GRANULOCYTES # BLD AUTO: 0 K/UL (ref 0–0.04)
IMM GRANULOCYTES NFR BLD AUTO: 0 % (ref 0–0.5)
LYMPHOCYTES # BLD: 1 K/UL (ref 0.9–3.6)
LYMPHOCYTES NFR BLD: 21 % (ref 21–52)
MCH RBC QN AUTO: 27.1 PG (ref 24–34)
MCHC RBC AUTO-ENTMCNC: 30.6 G/DL (ref 31–37)
MCV RBC AUTO: 88.5 FL (ref 78–100)
MONOCYTES # BLD: 0.6 K/UL (ref 0.05–1.2)
MONOCYTES NFR BLD: 13 % (ref 3–10)
NEUTS SEG # BLD: 3.1 K/UL (ref 1.8–8)
NEUTS SEG NFR BLD: 66 % (ref 40–73)
NRBC # BLD: 0 K/UL (ref 0–0.01)
NRBC BLD-RTO: 0 PER 100 WBC
PLATELET # BLD AUTO: 179 K/UL (ref 135–420)
PMV BLD AUTO: 12.5 FL (ref 9.2–11.8)
POTASSIUM SERPL-SCNC: 4.4 MMOL/L (ref 3.5–5.5)
RBC # BLD AUTO: 3.84 M/UL (ref 4.35–5.65)
SARS-COV-2 RDRP RESP QL NAA+PROBE: NOT DETECTED
SODIUM SERPL-SCNC: 140 MMOL/L (ref 136–145)
TROPONIN I SERPL HS-MCNC: 119 NG/L (ref 0–78)
WBC # BLD AUTO: 4.8 K/UL (ref 4.6–13.2)

## 2023-07-20 PROCEDURE — 36415 COLL VENOUS BLD VENIPUNCTURE: CPT

## 2023-07-20 PROCEDURE — 2580000003 HC RX 258: Performed by: NURSE PRACTITIONER

## 2023-07-20 PROCEDURE — 6370000000 HC RX 637 (ALT 250 FOR IP): Performed by: INTERNAL MEDICINE

## 2023-07-20 PROCEDURE — 6360000002 HC RX W HCPCS: Performed by: NURSE PRACTITIONER

## 2023-07-20 PROCEDURE — 93356 MYOCRD STRAIN IMG SPCKL TRCK: CPT

## 2023-07-20 PROCEDURE — 96376 TX/PRO/DX INJ SAME DRUG ADON: CPT

## 2023-07-20 PROCEDURE — 71275 CT ANGIOGRAPHY CHEST: CPT

## 2023-07-20 PROCEDURE — G0378 HOSPITAL OBSERVATION PER HR: HCPCS

## 2023-07-20 PROCEDURE — 6370000000 HC RX 637 (ALT 250 FOR IP): Performed by: NURSE PRACTITIONER

## 2023-07-20 PROCEDURE — 6360000004 HC RX CONTRAST MEDICATION: Performed by: NURSE PRACTITIONER

## 2023-07-20 PROCEDURE — 80048 BASIC METABOLIC PNL TOTAL CA: CPT

## 2023-07-20 PROCEDURE — 96372 THER/PROPH/DIAG INJ SC/IM: CPT

## 2023-07-20 PROCEDURE — 84484 ASSAY OF TROPONIN QUANT: CPT

## 2023-07-20 PROCEDURE — 2580000003 HC RX 258: Performed by: INTERNAL MEDICINE

## 2023-07-20 PROCEDURE — 85025 COMPLETE CBC W/AUTO DIFF WBC: CPT

## 2023-07-20 PROCEDURE — 90935 HEMODIALYSIS ONE EVALUATION: CPT

## 2023-07-20 RX ORDER — POLYETHYLENE GLYCOL 3350 17 G/17G
17 POWDER, FOR SOLUTION ORAL DAILY PRN
Status: DISCONTINUED | OUTPATIENT
Start: 2023-07-20 | End: 2023-07-21 | Stop reason: HOSPADM

## 2023-07-20 RX ORDER — ATORVASTATIN CALCIUM 40 MG/1
40 TABLET, FILM COATED ORAL NIGHTLY
Status: DISCONTINUED | OUTPATIENT
Start: 2023-07-20 | End: 2023-07-20

## 2023-07-20 RX ORDER — SEVELAMER CARBONATE 800 MG/1
800 TABLET, FILM COATED ORAL
Status: DISCONTINUED | OUTPATIENT
Start: 2023-07-20 | End: 2023-07-21 | Stop reason: HOSPADM

## 2023-07-20 RX ORDER — CARVEDILOL 12.5 MG/1
12.5 TABLET ORAL 2 TIMES DAILY WITH MEALS
Status: DISCONTINUED | OUTPATIENT
Start: 2023-07-20 | End: 2023-07-21 | Stop reason: HOSPADM

## 2023-07-20 RX ORDER — NIFEDIPINE 30 MG/1
60 TABLET, EXTENDED RELEASE ORAL 2 TIMES DAILY
Status: DISCONTINUED | OUTPATIENT
Start: 2023-07-20 | End: 2023-07-20

## 2023-07-20 RX ORDER — SODIUM CHLORIDE 9 MG/ML
INJECTION, SOLUTION INTRAVENOUS PRN
Status: DISCONTINUED | OUTPATIENT
Start: 2023-07-20 | End: 2023-07-21 | Stop reason: HOSPADM

## 2023-07-20 RX ORDER — ONDANSETRON 4 MG/1
4 TABLET, ORALLY DISINTEGRATING ORAL EVERY 8 HOURS PRN
Status: DISCONTINUED | OUTPATIENT
Start: 2023-07-20 | End: 2023-07-21 | Stop reason: HOSPADM

## 2023-07-20 RX ORDER — NIFEDIPINE 30 MG/1
90 TABLET, EXTENDED RELEASE ORAL 2 TIMES DAILY
Status: DISCONTINUED | OUTPATIENT
Start: 2023-07-20 | End: 2023-07-21 | Stop reason: HOSPADM

## 2023-07-20 RX ORDER — SODIUM CHLORIDE 9 MG/ML
INJECTION, SOLUTION INTRAVENOUS ONCE
Status: COMPLETED | OUTPATIENT
Start: 2023-07-20 | End: 2023-07-21

## 2023-07-20 RX ORDER — ACETAMINOPHEN 325 MG/1
650 TABLET ORAL EVERY 6 HOURS PRN
Status: DISCONTINUED | OUTPATIENT
Start: 2023-07-20 | End: 2023-07-21 | Stop reason: HOSPADM

## 2023-07-20 RX ORDER — ONDANSETRON 2 MG/ML
4 INJECTION INTRAMUSCULAR; INTRAVENOUS EVERY 6 HOURS PRN
Status: DISCONTINUED | OUTPATIENT
Start: 2023-07-20 | End: 2023-07-21 | Stop reason: HOSPADM

## 2023-07-20 RX ORDER — ACETAMINOPHEN 650 MG/1
650 SUPPOSITORY RECTAL EVERY 6 HOURS PRN
Status: DISCONTINUED | OUTPATIENT
Start: 2023-07-20 | End: 2023-07-21 | Stop reason: HOSPADM

## 2023-07-20 RX ORDER — SUCRALFATE 1 G/1
1 TABLET ORAL 4 TIMES DAILY
Status: DISCONTINUED | OUTPATIENT
Start: 2023-07-20 | End: 2023-07-21 | Stop reason: HOSPADM

## 2023-07-20 RX ORDER — SODIUM CHLORIDE 0.9 % (FLUSH) 0.9 %
5-40 SYRINGE (ML) INJECTION PRN
Status: DISCONTINUED | OUTPATIENT
Start: 2023-07-20 | End: 2023-07-21 | Stop reason: HOSPADM

## 2023-07-20 RX ORDER — HYDRALAZINE HYDROCHLORIDE 25 MG/1
100 TABLET, FILM COATED ORAL 2 TIMES DAILY
Status: DISCONTINUED | OUTPATIENT
Start: 2023-07-20 | End: 2023-07-21 | Stop reason: HOSPADM

## 2023-07-20 RX ORDER — ATORVASTATIN CALCIUM 10 MG/1
20 TABLET, FILM COATED ORAL NIGHTLY
Status: DISCONTINUED | OUTPATIENT
Start: 2023-07-20 | End: 2023-07-21 | Stop reason: HOSPADM

## 2023-07-20 RX ORDER — SODIUM CHLORIDE 0.9 % (FLUSH) 0.9 %
5-40 SYRINGE (ML) INJECTION EVERY 12 HOURS SCHEDULED
Status: DISCONTINUED | OUTPATIENT
Start: 2023-07-20 | End: 2023-07-21 | Stop reason: HOSPADM

## 2023-07-20 RX ORDER — HYDRALAZINE HYDROCHLORIDE 20 MG/ML
10 INJECTION INTRAMUSCULAR; INTRAVENOUS EVERY 4 HOURS PRN
Status: DISCONTINUED | OUTPATIENT
Start: 2023-07-20 | End: 2023-07-21 | Stop reason: HOSPADM

## 2023-07-20 RX ORDER — HEPARIN SODIUM 5000 [USP'U]/ML
5000 INJECTION, SOLUTION INTRAVENOUS; SUBCUTANEOUS EVERY 8 HOURS SCHEDULED
Status: DISCONTINUED | OUTPATIENT
Start: 2023-07-20 | End: 2023-07-21

## 2023-07-20 RX ORDER — HYDRALAZINE HYDROCHLORIDE 25 MG/1
100 TABLET, FILM COATED ORAL 4 TIMES DAILY
Status: DISCONTINUED | OUTPATIENT
Start: 2023-07-20 | End: 2023-07-20 | Stop reason: DRUGHIGH

## 2023-07-20 RX ORDER — HYDRALAZINE HYDROCHLORIDE 25 MG/1
100 TABLET, FILM COATED ORAL 4 TIMES DAILY
Status: DISCONTINUED | OUTPATIENT
Start: 2023-07-20 | End: 2023-07-20

## 2023-07-20 RX ADMIN — HYDRALAZINE HYDROCHLORIDE 10 MG: 20 INJECTION INTRAMUSCULAR; INTRAVENOUS at 18:15

## 2023-07-20 RX ADMIN — SUCRALFATE 1 G: 1 TABLET ORAL at 21:12

## 2023-07-20 RX ADMIN — HYDRALAZINE HYDROCHLORIDE 100 MG: 25 TABLET, FILM COATED ORAL at 21:12

## 2023-07-20 RX ADMIN — SUCRALFATE 1 G: 1 TABLET ORAL at 00:58

## 2023-07-20 RX ADMIN — SODIUM CHLORIDE, PRESERVATIVE FREE 10 ML: 5 INJECTION INTRAVENOUS at 21:12

## 2023-07-20 RX ADMIN — IOPAMIDOL 97 ML: 755 INJECTION, SOLUTION INTRAVENOUS at 11:14

## 2023-07-20 RX ADMIN — SODIUM CHLORIDE, PRESERVATIVE FREE 10 ML: 5 INJECTION INTRAVENOUS at 08:07

## 2023-07-20 RX ADMIN — ATORVASTATIN CALCIUM 20 MG: 10 TABLET, FILM COATED ORAL at 21:12

## 2023-07-20 RX ADMIN — SUCRALFATE 1 G: 1 TABLET ORAL at 08:09

## 2023-07-20 RX ADMIN — SEVELAMER CARBONATE 800 MG: 800 TABLET, FILM COATED ORAL at 18:14

## 2023-07-20 RX ADMIN — SEVELAMER CARBONATE 800 MG: 800 TABLET, FILM COATED ORAL at 08:09

## 2023-07-20 RX ADMIN — HEPARIN SODIUM 5000 UNITS: 5000 INJECTION INTRAVENOUS; SUBCUTANEOUS at 21:12

## 2023-07-20 RX ADMIN — HYDRALAZINE HYDROCHLORIDE 100 MG: 25 TABLET, FILM COATED ORAL at 01:21

## 2023-07-20 RX ADMIN — HEPARIN SODIUM 5000 UNITS: 5000 INJECTION INTRAVENOUS; SUBCUTANEOUS at 05:34

## 2023-07-20 RX ADMIN — SUCRALFATE 1 G: 1 TABLET ORAL at 13:11

## 2023-07-20 RX ADMIN — NIFEDIPINE 90 MG: 30 TABLET, EXTENDED RELEASE ORAL at 21:12

## 2023-07-20 RX ADMIN — HEPARIN SODIUM 5000 UNITS: 5000 INJECTION INTRAVENOUS; SUBCUTANEOUS at 13:12

## 2023-07-20 RX ADMIN — SEVELAMER CARBONATE 800 MG: 800 TABLET, FILM COATED ORAL at 13:11

## 2023-07-20 RX ADMIN — HYDRALAZINE HYDROCHLORIDE 10 MG: 20 INJECTION INTRAMUSCULAR; INTRAVENOUS at 03:04

## 2023-07-20 RX ADMIN — HEPARIN SODIUM 5000 UNITS: 5000 INJECTION INTRAVENOUS; SUBCUTANEOUS at 00:58

## 2023-07-20 RX ADMIN — NIFEDIPINE 60 MG: 30 TABLET, EXTENDED RELEASE ORAL at 00:58

## 2023-07-20 RX ADMIN — SODIUM CHLORIDE 2000 ML: 9 INJECTION, SOLUTION INTRAVENOUS at 15:46

## 2023-07-20 RX ADMIN — SUCRALFATE 1 G: 1 TABLET ORAL at 18:14

## 2023-07-20 RX ADMIN — CARVEDILOL 12.5 MG: 12.5 TABLET, FILM COATED ORAL at 18:14

## 2023-07-20 ASSESSMENT — PAIN SCALES - GENERAL
PAINLEVEL_OUTOF10: 0
PAINLEVEL_OUTOF10: 0
PAINLEVEL_OUTOF10: 6
PAINLEVEL_OUTOF10: 0
PAINLEVEL_OUTOF10: 0

## 2023-07-20 ASSESSMENT — ENCOUNTER SYMPTOMS
ABDOMINAL PAIN: 0
GASTROINTESTINAL NEGATIVE: 1
SHORTNESS OF BREATH: 1
RESPIRATORY NEGATIVE: 1
EYES NEGATIVE: 1

## 2023-07-20 NOTE — ASSESSMENT & PLAN NOTE
End-stage renal failure hemodialysis Tuesday Thursday Saturday  Patient states had dialysis on Tuesday without any problem  proBNP 17,542 tonight patient will need dialysis today  Nephro consult to Dr. Evelyn Cunningham

## 2023-07-20 NOTE — DIALYSIS
Pre treatment report with Marshall Winter RN, post report with Marshall Winter / Dr Jimmie Guevara present at start of treatment / primary nurse present as well / no patient distress, no pain, no SOB, no complaints / post treatment, all possible blood returned / hemostasis / Dr Jimmie Guevara notified that patient requested to end treatment 15 minutes early

## 2023-07-20 NOTE — CARE COORDINATION
Case Management Assessment  Initial Evaluation    Date/Time of Evaluation: 7/20/2023 10:45 AM  Assessment Completed by: Luis Hogan    If patient is discharged prior to next notation, then this note serves as note for discharge by case management. Patient Name: Shruthi Aguila                   YOB: 1975  Diagnosis: Chest pain [R07.9]  Chest pain, unspecified type [R07.9]                   Date / Time: 7/19/2023  7:07 PM    Patient Admission Status: Observation   Readmission Risk (Low < 19, Mod (19-27), High > 27): No data recorded  Current PCP: Timmy Cota MD  PCP verified by CM? Chart Reviewed: Yes      History Provided by: Patient  Patient Orientation: Alert and Oriented    Patient Cognition: Alert    Hospitalization in the last 30 days (Readmission):  No    If yes, Readmission Assessment in  Navigator will be completed. Advance Directives:      Code Status: Full Code   Patient's Primary Decision Maker is:        Discharge Planning:    Patient lives with: Alone Type of Home: Apartment  Primary Care Giver:    Patient Support Systems include: Parent   Current Financial resources:    Current community resources:    Current services prior to admission: None            Current DME:              Type of Home Care services:  None    ADLS  Prior functional level:    Current functional level:      PT AM-PAC:   /24  OT AM-PAC:   /24    Family can provide assistance at DC: Would you like Case Management to discuss the discharge plan with any other family members/significant others, and if so, who?     Plans to Return to Present Housing:    Other Identified Issues/Barriers to RETURNING to current housing: yes  Potential Assistance needed at discharge: N/A            Potential DME:    Patient expects to discharge to: [address] [place] [address] Road for transportation at discharge:      Financial    Payor: [address] [place] [address] / Plan: [address] [place] [address] HMO / Product Type: *No Product type* /     Does insurance

## 2023-07-20 NOTE — ASSESSMENT & PLAN NOTE
Pain is right-sided reproduced with palpation anterior upper chest  Increases with deep breath  No associated symptoms  Troponin elevated at 91 and 90, probable from ESRD  There were no acute findings on his EKG  Cardiology consult  Echocardiogram ordered last echo 70/92 diastolic dysfunction with EF of 61%  Chest x-ray shows cardiomegaly versus pericardial effusion   Repeat troponin in the morning  Wells score 3 secondary to no alternative diagnosis better explains his illness  VQ scan ordered secondary to chest pain on the right greater with inspiration  No shortness of breath reported for me presently

## 2023-07-20 NOTE — PROGRESS NOTES
0001  TRANSFER - IN REPORT:    Verbal report received from MANAN Sweeney RN on [facility]  being received from ED for routine progression of patient care      Report consisted of patient's Situation, Background, Assessment and   Recommendations(SBAR). Information from the following report(s) Nurse Handoff Report, ED Encounter Summary, Intake/Output, MAR, Recent Results, and Med Rec Status was reviewed with the receiving nurse. Opportunity for questions and clarification was provided. 0011  Assessment completed upon patient's arrival to unit and care assumed. Pt  aaox3, ambulated from stretcher to bathroom to void then to bed. Bp 207/113. ERINN Aguilar NP notified of elevated BP. Pt rate his pain 6/10 but refuses pain med, states his pain is much better. AV Fistula in ANGELES + Thrill +Bruit. CBWR.     0058  Nifedipine 60mg po and Hydralazine 100mg po given as ordered for elevated BP.     0200  Patient resting quietly in bed with eyes closed. Resp easy and nonlabored. No distress noted. 0304  /104. Hydralazine 10mg IV given for elevated BP. Pt voices no complaints. 0700   Shift report given to oncoming nurse.

## 2023-07-20 NOTE — PROGRESS NOTES
Nutrition Note    At time of visit, patient gone for dialysis. Will conduct a full assessment later if patient present.     Electronically signed by Mamta Phoenix on 7/20/23 at 4:07 PM EDT    Contact: [phone]

## 2023-07-20 NOTE — ED NOTES
TRANSFER - OUT REPORT:    Verbal report given to Ari on Vearl Rosio  being transferred to  for routine progression of patient care       Report consisted of patient's Situation, Background, Assessment and   Recommendations(SBAR). Information from the following report(s) Nurse Handoff Report was reviewed with the receiving nurse. Imer Fall Assessment:    Presents to emergency department  because of falls (Syncope, seizure, or loss of consciousness): No  Age > 70: No  Altered Mental Status, Intoxication with alcohol or substance confusion (Disorientation, impaired judgment, poor safety awaremess, or inability to follow instructions): No  Impaired Mobility: Ambulates or transfers with assistive devices or assistance; Unable to ambulate or transer.: No  Nursing Judgement: No          Lines:   Peripheral IV 07/19/23 Proximal;Right Forearm (Active)       Hemodialysis Central Access Left Forearm (Active)        Opportunity for questions and clarification was provided.       Patient transported with:  Monitor and Tech          Yamile Wilkins RN  07/20/23 0000

## 2023-07-20 NOTE — H&P
History and Physical      Subjective:     Doris Rodriges is a 50 y.o. male  has a past medical history of Anemia, CAD (coronary artery disease), Chronic kidney disease, GERD (gastroesophageal reflux disease), Hx of gastric ulcer, and Hypertension. Patient seen at bedside emergency department. Patient states he came to the ER tonight for 2 days of chest pain that started after eating a boiled egg. Patient's chest pain is on the right side it radiates into the neck is worse with a deep breath it is not constant and is reproducible with palpation. Patient denies any associated symptoms of nausea vomiting dizziness or palpitations. Patient states that when he was at dialysis on Tuesday they instructed him to come to the ER if the chest pain did not get better. Patient has been treated in the past for pericardial effusion and managed at [facility]. At this time patient's chest x-ray does show cardiomegaly versus a pericardial effusion his troponin is elevated at 91 and 90 this evening and this could be associated to his ESRD. Patient's proBNP is 17,542 he will need nephrology consult for hemodialysis today. Patient sees Dr. Perfecto Joyce for his dialysis needs. Patient's D-dimer is elevated at 2.42 given patient's symptoms and pain and elevated D-dimer. Wells score 3 exhibits moderate probability of PE given patient's renal failure VQ scan is ordered. Anticoagulation not indicated at this time given Wells score is very low no shortness of breath at this time will consider after the VQ scan. Given patient's history of pericardial effusion in the past cardiology consult echocardiogram ordered. Patient treated for hypertension in the ED with IV hydralazine will continue if needed.       Past Medical History:   Diagnosis Date    Anemia     CAD (coronary artery disease)     MI (12 years ago)    Chronic kidney disease     ESRD x4 years HD Treatment    GERD (gastroesophageal reflux disease)     Hx of gastric ulcer EKG  Cardiology consult  Echocardiogram ordered last echo 57/18 diastolic dysfunction with EF of 61%  Chest x-ray shows cardiomegaly versus pericardial effusion   Repeat troponin in the morning  Wells score 3 secondary to no alternative diagnosis better explains his illness  VQ scan ordered secondary to chest pain on the right greater with inspiration  No shortness of breath reported for me presently          ESRD (end stage renal disease) on dialysis [facility])  End-stage renal failure hemodialysis Tuesday Thursday Saturday  Patient states had dialysis on Tuesday without any problem  proBNP 17,542 tonight patient will need dialysis today  Nephro consult to Dr. Merlin Cinnamon      HTN (hypertension)  This is a chronic problem  Continue Coreg, hydralazine, nifedipine  Patient missed medications yesterday because he forgot  Patient hypertensive in ED hydralazine IV given  Hydralazine IV ordered as needed  Telemetry  Cardiology consult  Echocardiogram          Code Status: Full      Prophylaxis: Heparin subcu      Electronically Signed : Consuelo Jackson DNP, ENP-C, FNP-C, [facility] voice recognition was used to generate this report, which may have resulted in some phonetic based errors in grammar and contents.  Even though attempts were made to correct all the mistakes, some may have been missed, and remained in the body of the document

## 2023-07-20 NOTE — PROGRESS NOTES
Assumed care of pt 0700. Pt alert and oriented x4. ZAMAN. No complaints of pain. Room air O2sat >92%. Tolerating diet. Voids on own OOB to restroom. 0800 Echo completed. 1104 Pt taken for CTA. 1135 Pt returned from CTA. VSS  1330 Pt taken to dialysis  1820 Pt returned from dialysis. Approximately 3L removed. No complaints of pain. PRN hydralazine provided for SBP>180. VSS.   No further needs at this time

## 2023-07-20 NOTE — PROGRESS NOTES
Called and spoke with Dr. Anisha Weathers to advise about the nephrology consult. Also called Mylene stewart to advise of pt needing dialysis and left msg.

## 2023-07-20 NOTE — ASSESSMENT & PLAN NOTE
This is a chronic problem  Continue Coreg, hydralazine, nifedipine  Patient missed medications yesterday because he forgot  Patient hypertensive in ED hydralazine IV given  Hydralazine IV ordered as needed  Telemetry  Cardiology consult  Echocardiogram

## 2023-07-20 NOTE — CONSULTS
disintegrating tablet 4 mg, 4 mg, Oral, Q8H PRN **OR** ondansetron (ZOFRAN) injection 4 mg, 4 mg, IntraVENous, Q6H PRN, Everrett Guild, FNP    polyethylene glycol (GLYCOLAX) packet 17 g, 17 g, Oral, Daily PRN, Everrett Guild, FNP    acetaminophen (TYLENOL) tablet 650 mg, 650 mg, Oral, Q6H PRN **OR** acetaminophen (TYLENOL) suppository 650 mg, 650 mg, Rectal, Q6H PRN, Everrett Guild, FNP    carvedilol (COREG) tablet 12.5 mg, 12.5 mg, Oral, BID WC, Everrett Guild, FNP    NIFEdipine (PROCARDIA XL) extended release tablet 60 mg, 60 mg, Oral, BID, Everrett Guild, FNP, 60 mg at 07/20/23 0058    sevelamer (RENVELA) tablet 800 mg, 800 mg, Oral, TID WC, Everrett Guild, FNP, 800 mg at 07/20/23 0809    sucralfate (CARAFATE) tablet 1 g, 1 g, Oral, 4x Daily, Everrett Guild, FNP, 1 g at 07/20/23 0809    hydrALAZINE (APRESOLINE) injection 10 mg, 10 mg, IntraVENous, Q4H PRN, Everrett Guild, FNP, 10 mg at 07/20/23 0304    hydrALAZINE (APRESOLINE) tablet 100 mg, 100 mg, Oral, BID, Everrett Guild, FNP, 100 mg at 07/20/23 0121    iopamidol (ISOVUE-370) 76 % injection 97 mL, 97 mL, IntraVENous, ONCE PRN, Everrett Guild, FNP     ALLERGIES:  Allergies reviewed with the patient,  Allergies   Allergen Reactions    Sulfamethoxazole-Trimethoprim Rash     Patient had a rash on his penis. Adolph Israel FAMILY HISTORY:  Family history reviewed.    Family History   Problem Relation Age of Onset    Hypertension Maternal Aunt     Hypertension Father     Hypertension Mother          SOCIAL HISTORY:  Notable for   Social History     Socioeconomic History    Marital status: Single     Spouse name: None    Number of children: None    Years of education: None    Highest education level: None   Tobacco Use    Smoking status: Former    Smokeless tobacco: Never    Tobacco comments:     Quit smoking: l   Substance and Sexual Activity    Alcohol use: Not Currently     Alcohol/week: 1.0 - 2.0 standard drink    Drug use: cardiology follow-up 1-3 weeks post discharge. Will sign off for now. Please do not hesitate to call me or Dr. Michael Herndon if additional questions arise.     HIEN Le - NP  7/20/2023

## 2023-07-21 ENCOUNTER — APPOINTMENT (OUTPATIENT)
Age: 48
End: 2023-07-21
Attending: INTERNAL MEDICINE
Payer: MEDICARE

## 2023-07-21 VITALS
BODY MASS INDEX: 27.17 KG/M2 | HEIGHT: 73 IN | HEART RATE: 71 BPM | TEMPERATURE: 98 F | WEIGHT: 205 LBS | DIASTOLIC BLOOD PRESSURE: 89 MMHG | OXYGEN SATURATION: 94 % | RESPIRATION RATE: 18 BRPM | SYSTOLIC BLOOD PRESSURE: 161 MMHG

## 2023-07-21 LAB
ANION GAP SERPL CALC-SCNC: 8 MMOL/L (ref 3–18)
BASOPHILS # BLD: 0 K/UL (ref 0–0.1)
BASOPHILS NFR BLD: 0 % (ref 0–2)
BUN SERPL-MCNC: 39 MG/DL (ref 7–18)
BUN/CREAT SERPL: 4 (ref 12–20)
CA-I BLD-MCNC: 8.9 MG/DL (ref 8.5–10.1)
CHLORIDE SERPL-SCNC: 99 MMOL/L (ref 100–111)
CO2 SERPL-SCNC: 30 MMOL/L (ref 21–32)
CREAT SERPL-MCNC: 10.3 MG/DL (ref 0.6–1.3)
DIFFERENTIAL METHOD BLD: ABNORMAL
ECHO BSA: 2.19 M2
EOSINOPHIL # BLD: 0 K/UL (ref 0–0.4)
EOSINOPHIL NFR BLD: 1 % (ref 0–5)
ERYTHROCYTE [DISTWIDTH] IN BLOOD BY AUTOMATED COUNT: 15.6 % (ref 11.6–14.5)
GLUCOSE SERPL-MCNC: 123 MG/DL (ref 74–99)
HBV SURFACE AG SER QL: <0.1 INDEX
HBV SURFACE AG SER QL: NEGATIVE
HCT VFR BLD AUTO: 36 % (ref 36–48)
HGB BLD-MCNC: 11.2 G/DL (ref 13–16)
IMM GRANULOCYTES # BLD AUTO: 0 K/UL (ref 0–0.04)
IMM GRANULOCYTES NFR BLD AUTO: 0 % (ref 0–0.5)
LYMPHOCYTES # BLD: 0.6 K/UL (ref 0.9–3.6)
LYMPHOCYTES NFR BLD: 15 % (ref 21–52)
MCH RBC QN AUTO: 27.7 PG (ref 24–34)
MCHC RBC AUTO-ENTMCNC: 31.1 G/DL (ref 31–37)
MCV RBC AUTO: 89.1 FL (ref 78–100)
MONOCYTES # BLD: 0.4 K/UL (ref 0.05–1.2)
MONOCYTES NFR BLD: 10 % (ref 3–10)
NEUTS SEG # BLD: 3.2 K/UL (ref 1.8–8)
NEUTS SEG NFR BLD: 74 % (ref 40–73)
NRBC # BLD: 0 K/UL (ref 0–0.01)
NRBC BLD-RTO: 0 PER 100 WBC
PLATELET # BLD AUTO: 174 K/UL (ref 135–420)
PMV BLD AUTO: 12.3 FL (ref 9.2–11.8)
POTASSIUM SERPL-SCNC: 4.6 MMOL/L (ref 3.5–5.5)
RBC # BLD AUTO: 4.04 M/UL (ref 4.35–5.65)
SODIUM SERPL-SCNC: 137 MMOL/L (ref 136–145)
WBC # BLD AUTO: 4.3 K/UL (ref 4.6–13.2)

## 2023-07-21 PROCEDURE — 36415 COLL VENOUS BLD VENIPUNCTURE: CPT

## 2023-07-21 PROCEDURE — G0378 HOSPITAL OBSERVATION PER HR: HCPCS

## 2023-07-21 PROCEDURE — 93970 EXTREMITY STUDY: CPT

## 2023-07-21 PROCEDURE — 6370000000 HC RX 637 (ALT 250 FOR IP): Performed by: NURSE PRACTITIONER

## 2023-07-21 PROCEDURE — 96372 THER/PROPH/DIAG INJ SC/IM: CPT

## 2023-07-21 PROCEDURE — 85025 COMPLETE CBC W/AUTO DIFF WBC: CPT

## 2023-07-21 PROCEDURE — 6370000000 HC RX 637 (ALT 250 FOR IP): Performed by: INTERNAL MEDICINE

## 2023-07-21 PROCEDURE — 80048 BASIC METABOLIC PNL TOTAL CA: CPT

## 2023-07-21 PROCEDURE — 6360000002 HC RX W HCPCS: Performed by: NURSE PRACTITIONER

## 2023-07-21 PROCEDURE — 2580000003 HC RX 258: Performed by: NURSE PRACTITIONER

## 2023-07-21 RX ADMIN — SEVELAMER CARBONATE 800 MG: 800 TABLET, FILM COATED ORAL at 11:24

## 2023-07-21 RX ADMIN — CARVEDILOL 12.5 MG: 12.5 TABLET, FILM COATED ORAL at 08:01

## 2023-07-21 RX ADMIN — APIXABAN 10 MG: 5 TABLET, FILM COATED ORAL at 11:24

## 2023-07-21 RX ADMIN — SUCRALFATE 1 G: 1 TABLET ORAL at 08:00

## 2023-07-21 RX ADMIN — SODIUM CHLORIDE, PRESERVATIVE FREE 10 ML: 5 INJECTION INTRAVENOUS at 08:02

## 2023-07-21 RX ADMIN — ACETAMINOPHEN 650 MG: 325 TABLET ORAL at 12:00

## 2023-07-21 RX ADMIN — SEVELAMER CARBONATE 800 MG: 800 TABLET, FILM COATED ORAL at 08:00

## 2023-07-21 RX ADMIN — HYDRALAZINE HYDROCHLORIDE 100 MG: 25 TABLET, FILM COATED ORAL at 08:00

## 2023-07-21 RX ADMIN — SUCRALFATE 1 G: 1 TABLET ORAL at 11:23

## 2023-07-21 RX ADMIN — HEPARIN SODIUM 5000 UNITS: 5000 INJECTION INTRAVENOUS; SUBCUTANEOUS at 06:41

## 2023-07-21 RX ADMIN — NIFEDIPINE 90 MG: 30 TABLET, EXTENDED RELEASE ORAL at 08:01

## 2023-07-21 ASSESSMENT — PAIN SCALES - GENERAL
PAINLEVEL_OUTOF10: 0
PAINLEVEL_OUTOF10: 0
PAINLEVEL_OUTOF10: 3

## 2023-07-21 ASSESSMENT — PAIN DESCRIPTION - LOCATION: LOCATION: HEAD

## 2023-07-21 ASSESSMENT — PAIN DESCRIPTION - ORIENTATION: ORIENTATION: MID

## 2023-07-21 ASSESSMENT — PAIN DESCRIPTION - DESCRIPTORS: DESCRIPTORS: ACHING

## 2023-07-21 NOTE — PROGRESS NOTES
2000 - Rounded on pt. Pt resting in bed with family at bedside. No needs expressed to writer at this time. CBWR.     2110 - HS medications administered to pt as ordered per [facility]. Pt tolerated well. CBWR. Provided with sheila dozier. 2341 - Tele leads and wires replaced. 6633 - Rounded on pt, pt is resting in bed. Scheduled medications administered to pt as ordered.

## 2023-07-21 NOTE — PROGRESS NOTES
Patient is not available to be assessed at this time.       [address] [facility]   [phone]

## 2023-07-21 NOTE — CARE COORDINATION
[address] [place] [address] in [facility] patient will need to pay $4.30 for Eliquis, pt aware.

## 2023-07-21 NOTE — PROGRESS NOTES
Pt is alert and oriented and able to make needs known. No s/s of any pain or discomfort. Discharge instructions reviewed in full detail with the patient. Opportunity given for questions and answers provided. All belongings are with the patient. Pt was wheeled down in  the wheelchair. Pt is discharged in stable condition.

## 2023-07-21 NOTE — DISCHARGE SUMMARY
Discharge Summary       PATIENT ID: Patrizia Carlson  MRN: [id number]   YOB: 1975    DATE OF ADMISSION: 7/19/2023  7:07 PM    DATE OF DISCHARGE: 07/21/23    PRIMARY CARE PROVIDER: Chelita Garcia MD     ATTENDING PHYSICIAN: Fredy Kay MD  DISCHARGING PROVIDER: Fredy Kay MD        CONSULTATIONS: IP CONSULT TO CARDIOLOGY  IP CONSULT TO NEPHROLOGY  IP CONSULT TO CARDIOLOGY    PROCEDURES/SURGERIES: * No surgery found *    ADMITTING [address] [facility] COURSE:   Patrizia Carlson is a 50 y.o. male  has a past medical history of Anemia, CAD (coronary artery disease), Chronic kidney disease, GERD (gastroesophageal reflux disease), Hx of gastric ulcer, and Hypertension. Patient seen at bedside emergency department. Patient states he came to the ER tonight for 2 days of chest pain that started after eating a boiled egg. Patient's chest pain is on the right side it radiates into the neck is worse with a deep breath it is not constant and is reproducible with palpation. Patient denies any associated symptoms of nausea vomiting dizziness or palpitations. Patient states that when he was at dialysis on Tuesday they instructed him to come to the ER if the chest pain did not get better. Patient has been treated in the past for pericardial effusion and managed at [facility]. At this time patient's chest x-ray does show cardiomegaly versus a pericardial effusion his troponin is elevated at 91 and 90 this evening and this could be associated to his ESRD. Patient's proBNP is 17,542 he will need nephrology consult for hemodialysis today. Patient sees Dr. Laith Garcias for his dialysis needs. Patient's D-dimer is elevated at 2.42 given patient's symptoms and pain and elevated D-dimer. Wells score 3 exhibits moderate probability of PE given patient's renal failure VQ scan is ordered.   Anticoagulation not indicated at this time given Wells score is very low no shortness of breath at this time will consider after the NIFEdipine (ADALAT CC) 90 MG extended release tablet Take 1 tablet by mouth in the morning and at bedtime      carvedilol (COREG) 12.5 MG tablet Take 1 tablet by mouth 2 times daily (with meals)      hydrALAZINE (APRESOLINE) 100 MG tablet Take 1 tablet by mouth 2 times daily           STOP taking these medications       albuterol sulfate HFA (PROVENTIL;VENTOLIN;PROAIR) 108 (90 Base) MCG/ACT inhaler Comments:   Reason for Stopping:         sucralfate (CARAFATE) 1 GM tablet Comments:   Reason for Stopping:                 NOTIFY YOUR PHYSICIAN FOR ANY OF THE FOLLOWING:   Fever over 101 degrees for 24 hours. Chest pain, shortness of breath, fever, chills, nausea, vomiting, diarrhea, change in mentation, falling, weakness, bleeding. Severe pain or pain not relieved by medications. Or, any other signs or symptoms that you may have questions about. DISPOSITION:home    Home With:   OT  PT  HH  RN       Long term SNF/Inpatient Rehab    Independent/assisted living    Hospice    Other:       PATIENT CONDITION AT DISCHARGE:     Functional status    Poor     Deconditioned    x Independent      Cognition    x Lucid     Forgetful     Dementia      Catheters/lines (plus indication)    Morgan     PICC     PEG    x None      Code status   x  Full code     DNR      PHYSICAL EXAMINATION AT DISCHARGE:  General:          Alert, cooperative, no distress, appears stated age. HEENT:           Atraumatic, anicteric sclerae, pink conjunctivae                          No oral ulcers, mucosa moist, throat clear, dentition fair  Neck:               Supple, symmetrical  Lungs:             Clear to auscultation bilaterally. No Wheezing or Rhonchi. No rales. Chest wall:      No tenderness  No Accessory muscle use. Heart:              Regular  rhythm,  No  murmur   No edema  Abdomen:        Soft, non-tender. Not distended. Bowel sounds normal  Extremities:     No cyanosis.   No clubbing,                            Skin turgor normal,

## 2023-07-22 LAB
HBV SURFACE AB SER QL: REACTIVE
HBV SURFACE AB SER-ACNC: 113.74 MIU/ML

## 2023-07-24 LAB — ECHO BSA: 2.19 M2

## 2023-10-08 ENCOUNTER — HOSPITAL ENCOUNTER (EMERGENCY)
Age: 48
Discharge: HOME | End: 2023-10-08
Attending: FAMILY MEDICINE
Payer: MEDICARE

## 2023-10-08 VITALS
OXYGEN SATURATION: 100 % | BODY MASS INDEX: 25.84 KG/M2 | HEIGHT: 73 IN | TEMPERATURE: 98.5 F | HEART RATE: 67 BPM | WEIGHT: 195 LBS | DIASTOLIC BLOOD PRESSURE: 113 MMHG | RESPIRATION RATE: 17 BRPM | SYSTOLIC BLOOD PRESSURE: 186 MMHG

## 2023-10-08 DIAGNOSIS — N30.01 ACUTE CYSTITIS WITH HEMATURIA: Primary | ICD-10-CM

## 2023-10-08 LAB
APPEARANCE UR: ABNORMAL
BACTERIA URNS QL MICRO: ABNORMAL /HPF
BILIRUB UR QL: NEGATIVE
COLOR UR: YELLOW
EPITH CASTS URNS QL MICRO: ABNORMAL /LPF (ref 0–20)
GLUCOSE UR STRIP.AUTO-MCNC: 100 MG/DL
HGB UR QL STRIP: ABNORMAL
KETONES UR QL STRIP.AUTO: NEGATIVE MG/DL
LEUKOCYTE ESTERASE UR QL STRIP.AUTO: ABNORMAL
NITRITE UR QL STRIP.AUTO: NEGATIVE
PH UR STRIP: >8.5 [PH] (ref 5–8)
PROT UR STRIP-MCNC: 300 MG/DL
RBC #/AREA URNS HPF: ABNORMAL /HPF (ref 0–2)
SP GR UR REFRACTOMETRY: 1.01 (ref 1–1.03)
UROBILINOGEN UR QL STRIP.AUTO: 0.2 EU/DL (ref 0.2–1)
WBC URNS QL MICRO: ABNORMAL /HPF (ref 0–4)

## 2023-10-08 PROCEDURE — 81001 URINALYSIS AUTO W/SCOPE: CPT

## 2023-10-08 PROCEDURE — 87086 URINE CULTURE/COLONY COUNT: CPT

## 2023-10-08 PROCEDURE — 99283 EMERGENCY DEPT VISIT LOW MDM: CPT

## 2023-10-08 RX ORDER — GRANULES FOR ORAL 3 G/1
3 POWDER ORAL ONCE
Qty: 1 EACH | Refills: 0 | Status: SHIPPED | OUTPATIENT
Start: 2023-10-08 | End: 2023-10-08

## 2023-10-08 ASSESSMENT — LIFESTYLE VARIABLES
HOW MANY STANDARD DRINKS CONTAINING ALCOHOL DO YOU HAVE ON A TYPICAL DAY: 1 OR 2
HOW OFTEN DO YOU HAVE A DRINK CONTAINING ALCOHOL: MONTHLY OR LESS

## 2023-10-08 ASSESSMENT — ENCOUNTER SYMPTOMS
GASTROINTESTINAL NEGATIVE: 1
RESPIRATORY NEGATIVE: 1

## 2023-10-08 ASSESSMENT — PAIN - FUNCTIONAL ASSESSMENT: PAIN_FUNCTIONAL_ASSESSMENT: NONE - DENIES PAIN

## 2023-10-08 NOTE — ED PROVIDER NOTES
[facility] EMERGENCY DEPT  EMERGENCY DEPARTMENT ENCOUNTER      Pt Name: Tiffany Smith  MRN: [id number]  [address] [facility] [place] 1975  Date of evaluation: 10/8/2023  Provider: Kanchan Rinaldi DO    CHIEF COMPLAINT       Chief Complaint   Patient presents with    Hematuria         HISTORY OF PRESENT ILLNESS   (Location/Symptom, Timing/Onset, Context/Setting, Quality, Duration, Modifying Factors, Severity)  Note limiting factors. Tiffany Smith is a 50 y.o. male who presents to the emergency department hematuria     Patient presents to the ED for hematuria and dark urine. Symptoms started a couple days ago but then he passed a clot in his urine, prompting him to come to the ED. He has CKD, on dialysis Tu/Thu/Sa and has not missed a treatment. He denies flank pain, abdominal pain, n/v, or known fever. He reports occasional chills. Nothing makes his symptoms better or worse. He denies similar symptoms in the past. He was recently started on blood thinner for cardiac issues    The history is provided by the patient. Nursing Notes were reviewed. REVIEW OF SYSTEMS    (2-9 systems for level 4, 10 or more for level 5)     Review of Systems   Constitutional: Negative. Respiratory: Negative. Cardiovascular: Negative. Gastrointestinal: Negative. Genitourinary:  Positive for difficulty urinating and hematuria. Negative for flank pain, penile pain, testicular pain and urgency. Neurological: Negative. All other systems reviewed and are negative. Except as noted above the remainder of the review of systems was reviewed and negative.        PAST MEDICAL HISTORY     Past Medical History:   Diagnosis Date    Anemia     CAD (coronary artery disease)     MI (12 years ago)    Chronic kidney disease     ESRD x4 years HD Treatment    GERD (gastroesophageal reflux disease)     Hx of gastric ulcer 10/2020    Hypertension          SURGICAL HISTORY       Past Surgical History:   Procedure Laterality Date    COLONOSCOPY

## 2023-10-08 NOTE — ED TRIAGE NOTES
Pt reports dark urine since Thursday, reports he saw a blood clot in the urine this morning. Reports tripping over a bush, fall on cement step. C/o lt neck and shoulder pain and finger numbness . Has a rotator cuff tear. schedule for surgery. 01/22/18.

## 2023-10-08 NOTE — DISCHARGE INSTRUCTIONS
Go to pharmacy and take the prescribed medication. Follow up with your Primary Doctor.  Return to the ED for new or worsening symptoms

## 2023-10-10 LAB
BACTERIA SPEC CULT: ABNORMAL
COLONY COUNT, CNT: ABNORMAL
COLONY COUNT, CNT: ABNORMAL
Lab: ABNORMAL

## 2024-03-04 ENCOUNTER — TELEPHONE (OUTPATIENT)
Age: 49
End: 2024-03-04

## 2024-03-04 NOTE — TELEPHONE ENCOUNTER
Referral for abdominal pain. Please review and advise.        Referral  Referral # [id number]  Referral Information    Referral # Creation Date Referral Status Status Update    [id number] 03/04/2024 In Progress 03/04/2024: Status History     Status Reason Referral Type Referral Reasons Referral Class   none Consult for Advice and Opinion none Incoming     To Specialty To Provider To Location/Place of Service To Department   Gastroenterology Juan Hamm MD none HR [facility] GASTROENTEROLOGY     To Vendor Referred By By Location/Place of Service By Department   none Anderson Hernandez MD none none     Priority Start Date Expiration Date Referral Entered By   Routine 02/21/2024 02/20/2025 Brad Taylor     Visits Requested Visits Authorized Visits Completed Visits Scheduled   2 2       Coverages    Payor Plan Auth. Required? Covered? Member # Authorized From Expires Auth # Precert. # Comment   HU[employer]NA MEDICARE HU[employer] GOLD PLUS HMO -- Covered [id number] 3/1/2023 -- -- -- --   [place] [id number] 3/22/2023 -- -- -- --     Referral Information    Referral # Creation Date Referral Status Status Update    [id number] 03/04/2024 In Progress 03/04/2024: Status History     Status Reason Referral Type Referral Reasons Referral Class   none Consult for Advice and Opinion none Incoming     To Specialty To Provider To Location/Place of Service To Department   Gastroenterology Juan Hamm MD none HR [facility] GASTROENTEROLOGY     To Vendor Referred By By Location/Place of Service By Department   none Anedrson Hernandez MD none none     Priority Start Date Expiration Date Referral Entered By   Routine 02/21/2024 02/20/2025 rBad Taylor     Visits Requested Visits Authorized Visits Completed Visits Scheduled   2 2       Procedure Information    Service Details  Procedure Modifiers Provider Requested Approved   REF25 - AMB REFERRAL TO GASTROENTEROLOGY none

## 2024-05-09 ENCOUNTER — HOSPITAL ENCOUNTER (OUTPATIENT)
Age: 49
Discharge: HOME | End: 2024-05-11
Payer: MEDICARE

## 2024-05-09 VITALS
DIASTOLIC BLOOD PRESSURE: 79 MMHG | HEIGHT: 73 IN | WEIGHT: 195 LBS | SYSTOLIC BLOOD PRESSURE: 125 MMHG | BODY MASS INDEX: 25.84 KG/M2

## 2024-05-09 DIAGNOSIS — I10 HYPERTENSION, UNSPECIFIED TYPE: ICD-10-CM

## 2024-05-09 LAB
ECHO AO ASC DIAM: 3.5 CM
ECHO AO ASCENDING AORTA INDEX: 1.64 CM/M2
ECHO AO ROOT DIAM: 3.9 CM
ECHO AO ROOT INDEX: 1.83 CM/M2
ECHO AV AREA PEAK VELOCITY: 3.8 CM2
ECHO AV AREA VTI: 4.4 CM2
ECHO AV AREA/BSA PEAK VELOCITY: 1.8 CM2/M2
ECHO AV AREA/BSA VTI: 2.1 CM2/M2
ECHO AV MEAN GRADIENT: 4 MMHG
ECHO AV MEAN VELOCITY: 0.9 M/S
ECHO AV PEAK GRADIENT: 7 MMHG
ECHO AV PEAK VELOCITY: 1.3 M/S
ECHO AV VELOCITY RATIO: 0.77
ECHO AV VTI: 24.8 CM
ECHO BSA: 2.13 M2
ECHO LA DIAMETER INDEX: 2.07 CM/M2
ECHO LA DIAMETER: 4.4 CM
ECHO LA TO AORTIC ROOT RATIO: 1.13
ECHO LA VOL A-L A2C: 113 ML (ref 18–58)
ECHO LA VOL A-L A4C: 48 ML (ref 18–58)
ECHO LA VOL BP: 74 ML (ref 18–58)
ECHO LA VOL MOD A2C: 107 ML (ref 18–58)
ECHO LA VOL MOD A4C: 46 ML (ref 18–58)
ECHO LA VOL/BSA BIPLANE: 35 ML/M2 (ref 16–34)
ECHO LA VOLUME AREA LENGTH: 78 ML
ECHO LA VOLUME INDEX A-L A2C: 53 ML/M2 (ref 16–34)
ECHO LA VOLUME INDEX A-L A4C: 23 ML/M2 (ref 16–34)
ECHO LA VOLUME INDEX AREA LENGTH: 37 ML/M2 (ref 16–34)
ECHO LA VOLUME INDEX MOD A2C: 50 ML/M2 (ref 16–34)
ECHO LA VOLUME INDEX MOD A4C: 22 ML/M2 (ref 16–34)
ECHO LV E' LATERAL VELOCITY: 8 CM/S
ECHO LV E' SEPTAL VELOCITY: 5 CM/S
ECHO LV EJECTION FRACTION A2C: 59 %
ECHO LV EJECTION FRACTION A4C: 59 %
ECHO LV EJECTION FRACTION BIPLANE: 59 % (ref 55–100)
ECHO LV FRACTIONAL SHORTENING: 37 % (ref 28–44)
ECHO LV GLOBAL LONGITUDINAL STRAIN (GLS): -10.1 %
ECHO LV INTERNAL DIMENSION DIASTOLE INDEX: 2.39 CM/M2
ECHO LV INTERNAL DIMENSION DIASTOLIC: 5.1 CM (ref 4.2–5.9)
ECHO LV INTERNAL DIMENSION SYSTOLIC INDEX: 1.5 CM/M2
ECHO LV INTERNAL DIMENSION SYSTOLIC: 3.2 CM
ECHO LV IVSD: 1.7 CM (ref 0.6–1)
ECHO LV MASS 2D: 419.4 G (ref 88–224)
ECHO LV MASS INDEX 2D: 196.9 G/M2 (ref 49–115)
ECHO LV POSTERIOR WALL DIASTOLIC: 1.8 CM (ref 0.6–1)
ECHO LV RELATIVE WALL THICKNESS RATIO: 0.71
ECHO LVOT AREA: 4.9 CM2
ECHO LVOT AV VTI INDEX: 0.87
ECHO LVOT DIAM: 2.5 CM
ECHO LVOT MEAN GRADIENT: 2 MMHG
ECHO LVOT PEAK GRADIENT: 4 MMHG
ECHO LVOT PEAK VELOCITY: 1 M/S
ECHO LVOT STROKE VOLUME INDEX: 49.8 ML/M2
ECHO LVOT SV: 106 ML
ECHO LVOT VTI: 21.6 CM
ECHO MAIN PULMONARY ARTERY DIAMETER: 2.3 CM
ECHO MV A VELOCITY: 0.44 M/S
ECHO MV AREA VTI: 4.4 CM2
ECHO MV E DECELERATION TIME (DT): 183.9 MS
ECHO MV E VELOCITY: 0.75 M/S
ECHO MV E/A RATIO: 1.7
ECHO MV E/E' LATERAL: 9.38
ECHO MV E/E' RATIO (AVERAGED): 12.19
ECHO MV E/E' SEPTAL: 15
ECHO MV LVOT VTI INDEX: 1.11
ECHO MV MAX VELOCITY: 0.8 M/S
ECHO MV MEAN GRADIENT: 1 MMHG
ECHO MV MEAN VELOCITY: 0.5 M/S
ECHO MV PEAK GRADIENT: 2 MMHG
ECHO MV VTI: 23.9 CM
ECHO PERICARDIUM LATERAL DIMENSION: 0.5 CM
ECHO PERICARDIUM POSTERIOR DIMENSION: 0.5 CM
ECHO PV MAX VELOCITY: 0.8 M/S
ECHO PV MEAN GRADIENT: 1 MMHG
ECHO PV MEAN VELOCITY: 0.6 M/S
ECHO PV PEAK GRADIENT: 3 MMHG
ECHO RA END SYSTOLIC VOLUME APICAL 4 CHAMBER INDEX BSA: 52 ML/M2
ECHO RA VOLUME BIPLANE METHOD OF DISKS: 109 ML
ECHO RA VOLUME INDEX BP: 51 ML/M2
ECHO RA VOLUME: 111 ML
ECHO RV BASAL DIMENSION: 5.6 CM
ECHO RV FRACTIONAL AREA CHANGE: 50 %
ECHO RV LONGITUDINAL DIMENSION: 10.9 CM
ECHO RV MID DIMENSION: 4.3 CM

## 2024-05-09 PROCEDURE — 93306 TTE W/DOPPLER COMPLETE: CPT

## 2024-05-16 ENCOUNTER — APPOINTMENT (OUTPATIENT)
Age: 49
End: 2024-05-16
Payer: MEDICARE

## 2024-05-16 ENCOUNTER — HOSPITAL ENCOUNTER (EMERGENCY)
Age: 49
Discharge: HOME | End: 2024-05-16
Attending: FAMILY MEDICINE
Payer: MEDICARE

## 2024-05-16 VITALS
SYSTOLIC BLOOD PRESSURE: 148 MMHG | HEART RATE: 73 BPM | HEIGHT: 73 IN | DIASTOLIC BLOOD PRESSURE: 90 MMHG | BODY MASS INDEX: 26.51 KG/M2 | WEIGHT: 200 LBS | OXYGEN SATURATION: 100 % | TEMPERATURE: 99.7 F | RESPIRATION RATE: 21 BRPM

## 2024-05-16 DIAGNOSIS — K21.9 GASTROESOPHAGEAL REFLUX DISEASE, UNSPECIFIED WHETHER ESOPHAGITIS PRESENT: ICD-10-CM

## 2024-05-16 DIAGNOSIS — R07.89 ATYPICAL CHEST PAIN: Primary | ICD-10-CM

## 2024-05-16 DIAGNOSIS — N18.6 END STAGE RENAL DISEASE ON DIALYSIS (HCC): ICD-10-CM

## 2024-05-16 DIAGNOSIS — Z99.2 END STAGE RENAL DISEASE ON DIALYSIS (HCC): ICD-10-CM

## 2024-05-16 LAB
ALBUMIN SERPL-MCNC: 3.6 G/DL (ref 3.4–5)
ALBUMIN/GLOB SERPL: 0.8 (ref 0.8–1.7)
ALP SERPL-CCNC: 179 U/L (ref 45–117)
ALT SERPL-CCNC: 10 U/L (ref 16–61)
ANION GAP SERPL CALC-SCNC: 12 MMOL/L (ref 3–18)
AST SERPL W P-5'-P-CCNC: 5 U/L (ref 10–38)
BASOPHILS # BLD: 0 K/UL (ref 0–0.1)
BASOPHILS NFR BLD: 1 % (ref 0–2)
BILIRUB SERPL-MCNC: 0.4 MG/DL (ref 0.2–1)
BNP SERPL-MCNC: 1717 PG/ML (ref 0–450)
BUN SERPL-MCNC: 56 MG/DL (ref 7–18)
BUN/CREAT SERPL: 4 (ref 12–20)
CA-I BLD-MCNC: 9.4 MG/DL (ref 8.5–10.1)
CHLORIDE SERPL-SCNC: 100 MMOL/L (ref 100–111)
CO2 SERPL-SCNC: 30 MMOL/L (ref 21–32)
CREAT SERPL-MCNC: 13.7 MG/DL (ref 0.6–1.3)
DIFFERENTIAL METHOD BLD: ABNORMAL
EOSINOPHIL # BLD: 0.2 K/UL (ref 0–0.4)
EOSINOPHIL NFR BLD: 3 % (ref 0–5)
ERYTHROCYTE [DISTWIDTH] IN BLOOD BY AUTOMATED COUNT: 16.7 % (ref 11.6–14.5)
GLOBULIN SER CALC-MCNC: 4.4 G/DL (ref 2–4)
GLUCOSE SERPL-MCNC: 128 MG/DL (ref 74–99)
HCT VFR BLD AUTO: 37.5 % (ref 36–48)
HGB BLD-MCNC: 12 G/DL (ref 13–16)
IMM GRANULOCYTES # BLD AUTO: 0 K/UL (ref 0–0.04)
IMM GRANULOCYTES NFR BLD AUTO: 0 % (ref 0–0.5)
LYMPHOCYTES # BLD: 0.8 K/UL (ref 0.9–3.6)
LYMPHOCYTES NFR BLD: 12 % (ref 21–52)
MCH RBC QN AUTO: 28.3 PG (ref 24–34)
MCHC RBC AUTO-ENTMCNC: 32 G/DL (ref 31–37)
MCV RBC AUTO: 88.4 FL (ref 78–100)
MONOCYTES # BLD: 0.5 K/UL (ref 0.05–1.2)
MONOCYTES NFR BLD: 8 % (ref 3–10)
NEUTS SEG # BLD: 4.9 K/UL (ref 1.8–8)
NEUTS SEG NFR BLD: 76 % (ref 40–73)
NRBC # BLD: 0 K/UL (ref 0–0.01)
NRBC BLD-RTO: 0 PER 100 WBC
PLATELET # BLD AUTO: 181 K/UL (ref 135–420)
PMV BLD AUTO: 12 FL (ref 9.2–11.8)
POTASSIUM SERPL-SCNC: 4.7 MMOL/L (ref 3.5–5.5)
PROT SERPL-MCNC: 8 G/DL (ref 6.4–8.2)
RBC # BLD AUTO: 4.24 M/UL (ref 4.35–5.65)
SODIUM SERPL-SCNC: 142 MMOL/L (ref 136–145)
TROPONIN I SERPL HS-MCNC: 54 NG/L (ref 0–78)
TROPONIN I SERPL HS-MCNC: 61 NG/L (ref 0–78)
WBC # BLD AUTO: 6.4 K/UL (ref 4.6–13.2)

## 2024-05-16 PROCEDURE — 71045 X-RAY EXAM CHEST 1 VIEW: CPT

## 2024-05-16 PROCEDURE — 83880 ASSAY OF NATRIURETIC PEPTIDE: CPT

## 2024-05-16 PROCEDURE — 85025 COMPLETE CBC W/AUTO DIFF WBC: CPT

## 2024-05-16 PROCEDURE — 80053 COMPREHEN METABOLIC PANEL: CPT

## 2024-05-16 PROCEDURE — 84484 ASSAY OF TROPONIN QUANT: CPT

## 2024-05-16 PROCEDURE — 93005 ELECTROCARDIOGRAM TRACING: CPT | Performed by: FAMILY MEDICINE

## 2024-05-16 PROCEDURE — 99285 EMERGENCY DEPT VISIT HI MDM: CPT

## 2024-05-16 PROCEDURE — 6370000000 HC RX 637 (ALT 250 FOR IP): Performed by: FAMILY MEDICINE

## 2024-05-16 RX ORDER — PANTOPRAZOLE SODIUM 20 MG/1
20 TABLET, DELAYED RELEASE ORAL DAILY
Qty: 30 TABLET | Refills: 0 | Status: SHIPPED | OUTPATIENT
Start: 2024-05-16 | End: 2024-06-15

## 2024-05-16 RX ORDER — LIDOCAINE HYDROCHLORIDE 20 MG/ML
15 SOLUTION OROPHARYNGEAL
Status: COMPLETED | OUTPATIENT
Start: 2024-05-16 | End: 2024-05-16

## 2024-05-16 RX ORDER — MAGNESIUM HYDROXIDE/ALUMINUM HYDROXICE/SIMETHICONE 120; 1200; 1200 MG/30ML; MG/30ML; MG/30ML
30 SUSPENSION ORAL
Status: COMPLETED | OUTPATIENT
Start: 2024-05-16 | End: 2024-05-16

## 2024-05-16 RX ADMIN — ALUMINUM HYDROXIDE, MAGNESIUM HYDROXIDE, AND SIMETHICONE 30 ML: 1200; 120; 1200 SUSPENSION ORAL at 10:30

## 2024-05-16 RX ADMIN — LIDOCAINE HYDROCHLORIDE 15 ML: 20 SOLUTION ORAL at 10:30

## 2024-05-16 ASSESSMENT — PAIN SCALES - GENERAL: PAINLEVEL_OUTOF10: 6

## 2024-05-16 ASSESSMENT — PAIN - FUNCTIONAL ASSESSMENT: PAIN_FUNCTIONAL_ASSESSMENT: 0-10

## 2024-05-16 ASSESSMENT — PAIN DESCRIPTION - DESCRIPTORS: DESCRIPTORS: PRESSURE

## 2024-05-16 ASSESSMENT — PAIN DESCRIPTION - LOCATION: LOCATION: CHEST

## 2024-05-16 ASSESSMENT — LIFESTYLE VARIABLES
HOW OFTEN DO YOU HAVE A DRINK CONTAINING ALCOHOL: NEVER
HOW MANY STANDARD DRINKS CONTAINING ALCOHOL DO YOU HAVE ON A TYPICAL DAY: PATIENT DOES NOT DRINK

## 2024-05-16 ASSESSMENT — ENCOUNTER SYMPTOMS: SHORTNESS OF BREATH: 0

## 2024-05-16 NOTE — ED TRIAGE NOTES
Patient c/o left side back pain and chest pain since Monday.  Patient states having stress test yesterday at [facility].

## 2024-05-16 NOTE — ED NOTES
Patient states having diarrhea yesterday following IV Contrast for Stress Test.   He states having dialysis on Monday and Tuesday of this week related to appointment being scheduled on Wednesday for Cardiac Stress Test.  He states typically having  dialysis on M, W, & F.

## 2024-05-16 NOTE — ED PROVIDER NOTES
[facility] EMERGENCY DEPT  EMERGENCY DEPARTMENT ENCOUNTER      Pt Name: Domenic Hutchinson  MRN: [id number]  Birthdate 1975  Date of evaluation: 5/16/2024  Provider: Mainor Baez DO  11:06 AM    CHIEF COMPLAINT       Chief Complaint   Patient presents with    Chest Pain    Back Pain         HISTORY OF PRESENT ILLNESS    Domenic Hutchinson is a 49 y.o. male who presents to the emergency department patient comes in stating has been having chest pain for couple days, recently had a stress test done yesterday on sure of the results.  States that his chest pain is a pressure rated as a 6 out of 10 does not radiate.  Denies any nausea vomiting is a dialysis patient who does make some urine.  He weighs 200 pounds is his normal weight, admits that in the last month he has gained some weight.  Last dialysis was on Tuesday because of the stress test yesterday.  Scheduled to have dialysis on Friday denies any shortness of breath.  Did take some Tylenol which has helped minimally with the pain that he rates as a 6 out of 10, celina worse when lying down    HPI    Nursing Notes were reviewed.    REVIEW OF SYSTEMS       Review of Systems   Constitutional:  Negative for fever.   Respiratory:  Negative for shortness of breath.    Cardiovascular:  Positive for chest pain.   All other systems reviewed and are negative.      Except as noted above the remainder of the review of systems was reviewed and negative.       PAST MEDICAL HISTORY     Past Medical History:   Diagnosis Date    Anemia     CAD (coronary artery disease)     MI (12 years ago)    Chronic kidney disease     ESRD x4 years HD Treatment    Dialysis patient ([facility])     M, W, F    GERD (gastroesophageal reflux disease)     Hx of gastric ulcer 10/2020    Hypertension          SURGICAL HISTORY       Past Surgical History:   Procedure Laterality Date    COLONOSCOPY  2021    \"negative\"    CORONARY ARTERY BYPASS GRAFT      ORTHOPEDIC SURGERY      left carpal tunnel     OTHER SURGICAL  chest pain is completely resolved itself.  His proBNP is elevated but it has been elevated more in the past.  He has end-stage renal disease patient.  Who is scheduled for dialysis tomorrow.  Awaiting a second troponin at this time [WS]      ED Course User Index  [WS] Mainor Baez,          CRITICAL CARE TIME   Total Critical Care time was 35 minutes, excluding separately reportable procedures.  There was a high probability of clinically significant/life threatening deterioration in the patient's condition which required my urgent intervention.  Atypical chest pain, this does not include procedure time.  Discussion with cardiology, reviewing previous labs as well as previous notes.    CONSULTS:  IP CONSULT TO CARDIOLOGY: I did speak with Tracie with cardiology group, she is going to attempt to find what the stress test results were from yesterday.  She will call me back.  This approximately 10 AM    PROCEDURES:  Unless otherwise noted below, none     Procedures        FINAL IMPRESSION      1. Atypical chest pain    2. Gastroesophageal reflux disease, unspecified whether esophagitis present    3. End stage renal disease on dialysis (HCC)          DISPOSITION/PLAN   DISPOSITION Decision To Discharge 05/16/2024 11:03:45 AM      PATIENT REFERRED TO:  [facility]-[address] [id number] [phone]  On 5/31/2024  Keep your appointment with the cardiology group on May 31    Anderson Hernandez MD  [address] [place] [address]  [phone]    Schedule an appointment as soon as possible for a visit         DISCHARGE MEDICATIONS:  New Prescriptions    PANTOPRAZOLE (PROTONIX) 20 MG TABLET    Take 1 tablet by mouth daily     Controlled Substances Monitoring:          No data to display                (Please note that portions of this note were completed with a voice recognition program.  Efforts were made to edit the dictations but occasionally words are

## 2024-05-16 NOTE — DISCHARGE INSTRUCTIONS
As we spoke, I have high suspicion that this is more of a reflux issue your troponins are negative x 2, chest x-ray is normal, your stress test was normal as well.  Keep your appointment with cardiology on the 31st, I do want you to start taking some Protonix.  I have called this prescription in.  Follow-up with your primary care doctor Dr. Hernandez, as you will probably need a referral to Dr. Hamm for EGD.  Keep your dialysis appointment tomorrow.  Return to the emergency department if you have any questions or concerns.

## 2024-05-16 NOTE — CONSULTS
CARDIOLOGY CONSULTATION    REASON FOR CONSULT: Chest pain    REQUESTING PROVIDER: Mainor Baez MD     CHIEF COMPLAINT:  Chest pain    HISTORY OF PRESENT ILLNESS:  Domenic Hutchinson is a 49 y.o. year-old male with past medical history significant for hypertension, end-stage renal disease, HD MWF, cardiomyopathy with preserved EF, history of pericardial effusion s/p pericardia window (2022) and GERD who was evaluated today due to chest pain.  Patient seen and examined in the emergency room.  States intermittent left-sided chest pain for a few weeks.  States worsening symptoms since Monday.  He further explains pain is worse at night when laying on left side.  He underwent a cardiac stress test with [facility] yesterday with normal findings, no evidence of ischemia or infarct.  He denies any palpitations or shortness of breath.  No dizziness.  No nausea or vomiting.  No recent illness, fever or chills.  After discussing with patient he feels like this is more of a GI issue. He takes OTC PPIs as needed for GERD.  Discussed pursuing a GI workup outpatient and he is in agreement with plan.      Records from hospital admission course thus far reviewed.      Telemetry reviewed.  SR.     INPATIENT MEDICATIONS:  Home medications reviewed.  No current facility-administered medications for this encounter.    Current Outpatient Medications:     sevelamer (RENVELA) 800 MG tablet, Take 1 tablet by mouth 3 times daily (with meals), Disp: , Rfl:     NIFEdipine (ADALAT CC) 90 MG extended release tablet, Take 1 tablet by mouth in the morning and at bedtime, Disp: , Rfl:     carvedilol (COREG) 12.5 MG tablet, Take 2 tablets by mouth 2 times daily (with meals), Disp: , Rfl:     hydrALAZINE (APRESOLINE) 100 MG tablet, Take 1 tablet by mouth 2 times daily, Disp: , Rfl:      ALLERGIES:  Allergies reviewed with the patient,  Allergies   Allergen Reactions    Sulfamethoxazole-Trimethoprim Rash     Patient had a rash on his penis.   Other

## 2024-05-17 LAB
EKG ATRIAL RATE: 81 BPM
EKG DIAGNOSIS: NORMAL
EKG P AXIS: 56 DEGREES
EKG P-R INTERVAL: 188 MS
EKG Q-T INTERVAL: 358 MS
EKG QRS DURATION: 98 MS
EKG QTC CALCULATION (BAZETT): 415 MS
EKG R AXIS: -38 DEGREES
EKG T AXIS: 92 DEGREES
EKG VENTRICULAR RATE: 81 BPM

## 2024-05-23 ENCOUNTER — TELEPHONE (OUTPATIENT)
Age: 49
End: 2024-05-23

## 2024-05-23 NOTE — TELEPHONE ENCOUNTER
Referral for GERD . Please review and advise.      Referral  Referral # [id number]  Referral Information    Referral # Creation Date Referral Status Status Update    [id number] 05/23/2024 In Progress 05/23/2024: Status History     Status Reason Referral Type Referral Reasons Referral Class   none Eval and Treat none Incoming     To Specialty To Provider To Location/Place of Service To Department   none Juan Hamm MD none none     To Vendor Referred By By Location/Place of Service By Department   none Tracie Lowry APRN - CNP none none     Priority Start Date Expiration Date Referral Entered By   Routine 05/17/2024 05/17/2025 Isi Lucas LPN     Visits Requested Visits Authorized Visits Completed Visits Scheduled   2 2       Coverages    Payor Plan Auth. Required? Covered? Member # Authorized From Expires Auth # Precert. # Comment   [place] [employer] [place] [employer] -- Covered [id number] 1/1/2024 -- -- -- --   [place] C[place] [id number] 3/22/2023 -- -- -- --     Referral Information    Referral # Creation Date Referral Status Status Update    [id number] 05/23/2024 In Progress 05/23/2024: Status History     Status Reason Referral Type Referral Reasons Referral Class   none Eval and Treat none Incoming     To Specialty To Provider To Location/Place of Service To Department   none Juan Hamm MD none none     To Vendor Referred By By Location/Place of Service By Department   none Tracie Lowry, APRN - CNP none none     Priority Start Date Expiration Date Referral Entered By   Routine 05/17/2024 05/17/2025 Isi Lucas LPN     Visits Requested Visits Authorized Visits Completed Visits Scheduled   2 2       Procedure Information    Service Details  Procedure Modifiers Provider Requested Approved   REF25 - AMB REFERRAL TO GASTROENTEROLOGY none  2 2     Diagnosis Information    Diagnosis Description   GERD     Service Level Authorizations    No service level

## 2024-07-09 ENCOUNTER — APPOINTMENT (OUTPATIENT)
Age: 49
End: 2024-07-09
Payer: MEDICARE

## 2024-07-09 ENCOUNTER — HOSPITAL ENCOUNTER (EMERGENCY)
Age: 49
Discharge: HOME | End: 2024-07-09
Attending: EMERGENCY MEDICINE
Payer: MEDICARE

## 2024-07-09 VITALS
OXYGEN SATURATION: 100 % | RESPIRATION RATE: 12 BRPM | WEIGHT: 196 LBS | SYSTOLIC BLOOD PRESSURE: 126 MMHG | BODY MASS INDEX: 25.86 KG/M2 | HEART RATE: 58 BPM | TEMPERATURE: 97.1 F | DIASTOLIC BLOOD PRESSURE: 85 MMHG

## 2024-07-09 DIAGNOSIS — R10.33 PERIUMBILICAL ABDOMINAL PAIN: Primary | ICD-10-CM

## 2024-07-09 DIAGNOSIS — R53.83 OTHER FATIGUE: ICD-10-CM

## 2024-07-09 LAB
ABO + RH BLD: NORMAL
ALBUMIN SERPL-MCNC: 3.9 G/DL (ref 3.4–5)
ALBUMIN/GLOB SERPL: 0.9 (ref 0.8–1.7)
ALP SERPL-CCNC: 364 U/L (ref 45–117)
ALT SERPL-CCNC: 7 U/L (ref 16–61)
ANION GAP SERPL CALC-SCNC: 8 MMOL/L (ref 3–18)
APPEARANCE UR: CLEAR
AST SERPL W P-5'-P-CCNC: 15 U/L (ref 10–38)
BACTERIA URNS QL MICRO: ABNORMAL /HPF
BASOPHILS # BLD: 0 K/UL (ref 0–0.1)
BASOPHILS NFR BLD: 1 % (ref 0–2)
BILIRUB SERPL-MCNC: 0.2 MG/DL (ref 0.2–1)
BILIRUB UR QL: NEGATIVE
BLOOD GROUP ANTIBODIES SERPL: NEGATIVE
BUN SERPL-MCNC: 31 MG/DL (ref 7–18)
BUN/CREAT SERPL: 9 (ref 12–20)
CA-I BLD-MCNC: 10.2 MG/DL (ref 8.5–10.1)
CHLORIDE SERPL-SCNC: 114 MMOL/L (ref 100–111)
CO2 SERPL-SCNC: 19 MMOL/L (ref 21–32)
COLLECT DATE STL: NORMAL
COLOR UR: YELLOW
CREAT SERPL-MCNC: 3.4 MG/DL (ref 0.6–1.3)
DIFFERENTIAL METHOD BLD: ABNORMAL
EKG ATRIAL RATE: 57 BPM
EKG DIAGNOSIS: NORMAL
EKG P AXIS: 55 DEGREES
EKG P-R INTERVAL: 196 MS
EKG Q-T INTERVAL: 404 MS
EKG QRS DURATION: 104 MS
EKG QTC CALCULATION (BAZETT): 393 MS
EKG R AXIS: -28 DEGREES
EKG T AXIS: 164 DEGREES
EKG VENTRICULAR RATE: 57 BPM
EOSINOPHIL # BLD: 0 K/UL (ref 0–0.4)
EOSINOPHIL NFR BLD: 1 % (ref 0–5)
EPITH CASTS URNS QL MICRO: ABNORMAL /LPF (ref 0–20)
ERYTHROCYTE [DISTWIDTH] IN BLOOD BY AUTOMATED COUNT: 22.5 % (ref 11.6–14.5)
GLOBULIN SER CALC-MCNC: 4.2 G/DL (ref 2–4)
GLUCOSE SERPL-MCNC: 99 MG/DL (ref 74–99)
GLUCOSE UR STRIP.AUTO-MCNC: NEGATIVE MG/DL
HCT VFR BLD AUTO: 33.8 % (ref 36–48)
HEMOCCULT SP1 STL QL: NEGATIVE
HGB BLD-MCNC: 10.2 G/DL (ref 13–16)
HGB UR QL STRIP: NEGATIVE
IMM GRANULOCYTES # BLD AUTO: 0.1 K/UL (ref 0–0.04)
IMM GRANULOCYTES NFR BLD AUTO: 2 % (ref 0–0.5)
INR PPP: 1.2 (ref 0.9–1.1)
KETONES UR QL STRIP.AUTO: NEGATIVE MG/DL
LACTATE SERPL-SCNC: 0.6 MMOL/L (ref 0.4–2)
LEUKOCYTE ESTERASE UR QL STRIP.AUTO: NEGATIVE
LIPASE SERPL-CCNC: 32 U/L (ref 13–75)
LYMPHOCYTES # BLD: 0.3 K/UL (ref 0.9–3.6)
LYMPHOCYTES NFR BLD: 9 % (ref 21–52)
MAGNESIUM SERPL-MCNC: 2.2 MG/DL (ref 1.6–2.6)
MCH RBC QN AUTO: 27.1 PG (ref 24–34)
MCHC RBC AUTO-ENTMCNC: 30.2 G/DL (ref 31–37)
MCV RBC AUTO: 89.7 FL (ref 78–100)
MONOCYTES # BLD: 0.4 K/UL (ref 0.05–1.2)
MONOCYTES NFR BLD: 13 % (ref 3–10)
NEUTS SEG # BLD: 2.4 K/UL (ref 1.8–8)
NEUTS SEG NFR BLD: 74 % (ref 40–73)
NITRITE UR QL STRIP.AUTO: NEGATIVE
NRBC # BLD: 0 K/UL (ref 0–0.01)
NRBC BLD-RTO: 0 PER 100 WBC
PH UR STRIP: 5 (ref 5–8)
PHOSPHATE SERPL-MCNC: 2.9 MG/DL (ref 2.5–4.9)
PLATELET # BLD AUTO: 211 K/UL (ref 135–420)
PMV BLD AUTO: 11.6 FL (ref 9.2–11.8)
POTASSIUM SERPL-SCNC: 4.9 MMOL/L (ref 3.5–5.5)
PROT SERPL-MCNC: 8.1 G/DL (ref 6.4–8.2)
PROT UR STRIP-MCNC: 30 MG/DL
PROTHROMBIN TIME: 14.9 SEC (ref 11.9–14.9)
RBC # BLD AUTO: 3.77 M/UL (ref 4.35–5.65)
RBC #/AREA URNS HPF: ABNORMAL /HPF (ref 0–2)
SODIUM SERPL-SCNC: 141 MMOL/L (ref 136–145)
SP GR UR REFRACTOMETRY: 1.01 (ref 1–1.03)
SPECIMEN EXP DATE BLD: NORMAL
TROPONIN I SERPL HS-MCNC: 45 NG/L (ref 0–78)
TROPONIN I SERPL HS-MCNC: 46 NG/L (ref 0–78)
TSH SERPL DL<=0.05 MIU/L-ACNC: 0.9 UIU/ML (ref 0.36–3.74)
UROBILINOGEN UR QL STRIP.AUTO: 0.2 EU/DL (ref 0.2–1)
WBC # BLD AUTO: 3.2 K/UL (ref 4.6–13.2)
WBC URNS QL MICRO: ABNORMAL /HPF (ref 0–4)

## 2024-07-09 PROCEDURE — 81001 URINALYSIS AUTO W/SCOPE: CPT

## 2024-07-09 PROCEDURE — 82272 OCCULT BLD FECES 1-3 TESTS: CPT

## 2024-07-09 PROCEDURE — 87040 BLOOD CULTURE FOR BACTERIA: CPT

## 2024-07-09 PROCEDURE — 86850 RBC ANTIBODY SCREEN: CPT

## 2024-07-09 PROCEDURE — 83735 ASSAY OF MAGNESIUM: CPT

## 2024-07-09 PROCEDURE — 86901 BLOOD TYPING SEROLOGIC RH(D): CPT

## 2024-07-09 PROCEDURE — 84100 ASSAY OF PHOSPHORUS: CPT

## 2024-07-09 PROCEDURE — 80053 COMPREHEN METABOLIC PANEL: CPT

## 2024-07-09 PROCEDURE — 83690 ASSAY OF LIPASE: CPT

## 2024-07-09 PROCEDURE — 99284 EMERGENCY DEPT VISIT MOD MDM: CPT

## 2024-07-09 PROCEDURE — 84484 ASSAY OF TROPONIN QUANT: CPT

## 2024-07-09 PROCEDURE — 84443 ASSAY THYROID STIM HORMONE: CPT

## 2024-07-09 PROCEDURE — 86900 BLOOD TYPING SEROLOGIC ABO: CPT

## 2024-07-09 PROCEDURE — 85025 COMPLETE CBC W/AUTO DIFF WBC: CPT

## 2024-07-09 PROCEDURE — 83605 ASSAY OF LACTIC ACID: CPT

## 2024-07-09 PROCEDURE — 74176 CT ABD & PELVIS W/O CONTRAST: CPT

## 2024-07-09 PROCEDURE — 85610 PROTHROMBIN TIME: CPT

## 2024-07-09 PROCEDURE — 93005 ELECTROCARDIOGRAM TRACING: CPT | Performed by: EMERGENCY MEDICINE

## 2024-07-09 RX ORDER — MYCOPHENOLATE MOFETIL 250 MG/1
CAPSULE ORAL
COMMUNITY

## 2024-07-09 RX ORDER — PREDNISOLONE 5 MG/1
TABLET ORAL
COMMUNITY

## 2024-07-09 RX ORDER — TACROLIMUS 1 MG/1
CAPSULE ORAL
COMMUNITY

## 2024-07-09 RX ORDER — ACYCLOVIR 200 MG/1
CAPSULE ORAL
COMMUNITY

## 2024-07-09 NOTE — ED PROVIDER NOTES
Care was transitioned to me at shift change to follow-up on pending workup.  He is afebrile.  Abdomen soft and nontender.  Serum creatinine 3.40 today.  Per chart review this is near his recent baseline post transplant. (6/25/24 4.88, 7/3/24 3.28)  Hemoglobin 10.2.  CT abdomen pelvis without acute abnormality.    2+ bacteria in urine however specimen contaminated.  No white blood cells.  No red blood cells.  No leukocyte Estrace or nitrites.  No dysuria hematuria nor frequency.  Do not suspect UTI.    Feeling better on reassessment however still tired.  Feel safe for discharge home and close follow-up with his renal transplant team.  Has appointment on Thursday.     Shamir Pike,   07/09/24 6930

## 2024-07-09 NOTE — ED PROVIDER NOTES
[facility] EMERGENCY DEPT  EMERGENCY DEPARTMENT ENCOUNTER    Patient Name: Domenic Hutchinson  MRN: [id number]  YOB: 1975  Provider: Rob Dc DO  PCP: Anderson Hernandez MD   Time/Date of evaluation: 6:33 AM EDT on 7/9/24    History of Presenting Illness     History Provided by: Patient  History is limited by: Nothing     HISTORY:   Domenic Hutchinson is a 49 y.o. male with history of anemia, coronary artery disease, status post MI 12 years ago, end-stage renal disease status post recent kidney transplant May 22nd, 2024, GERD, hypertension, and history of gastric ulcer presents to the emergency department with a chief complaint of fatigue.   patient states that he has been experiencing increased fatigue whenever he exerts himself over the past week.  He states that he felt similar symptoms in the past and was noted to be anemic.  Patient thinks that his blood count is low again.  He also reports that he experienced an episode of dark\" black stool\" and has been experiencing mild pain around his umbilicus for the past week.  He states that he shared this information with his transplant coordinator who requested that he come to [place] for evaluation.  Patient states that he went to [place] last week and had blood work performed but does not know the results.  He denies any fever, chills, chest pain, shortness of breath, cough, headache, sore throat, rash, palpitations, vomiting, diarrhea, constipation, flank pain, dysuria, hematuria, bright red rectal bleeding, testicular pain, or testicular swelling.  He states that he lives alone and was afraid to drive to [place] yesterday as requested by his transplant coordinator.    Nursing Notes were all reviewed and agreed with or any disagreements were addressed in the HPI.    Past History     PAST MEDICAL HISTORY:  Past Medical History:   Diagnosis Date    Anemia     CAD (coronary artery disease)     MI (12 years ago)    Chronic kidney disease     ESRD x4 years HD

## 2024-07-09 NOTE — ED TRIAGE NOTES
Pt states he is easily fatigue and thinks his hemoglobin is low. Pt states last time he felt like this it was low. Pt recent kidney transplant patient.

## 2024-07-09 NOTE — ED NOTES
Received care of pt who is resting in bed with no acute distress noted at this time. Will continue to monitor closely

## 2024-07-09 NOTE — ED NOTES
Denies shortness of breath, chest pain. Pt states some soreness to his RLQ at his surgical site from recent kidney transplant. Denies nausea, vomiting.

## 2024-07-09 NOTE — DISCHARGE INSTRUCTIONS
as possible to prevent a delay in treatment. If you have any questions or reservations about taking the medication due to side effects or interactions with other medications, please call your primary care provider or contact us directly.  Again, THANK YOU for choosing us to care for YOU!

## 2024-07-14 LAB
BACTERIA SPEC CULT: NORMAL
BACTERIA SPEC CULT: NORMAL
Lab: NORMAL
Lab: NORMAL

## 2024-07-15 LAB
BACTERIA SPEC CULT: NORMAL
BACTERIA SPEC CULT: NORMAL
Lab: NORMAL
Lab: NORMAL

## 2024-08-01 ENCOUNTER — HOSPITAL ENCOUNTER (OUTPATIENT)
Age: 49
Discharge: HOME | End: 2024-08-04

## 2024-08-01 LAB — SENTARA SPECIMEN COLLECTION: NORMAL

## 2024-08-01 PROCEDURE — 99001 SPECIMEN HANDLING PT-LAB: CPT

## 2024-08-09 NOTE — PROGRESS NOTES
History and Physical      Drake Torres   1953   Renny Rojas MD   8/9/2024    CHIEF COMPLAINT:  Left Knee Pain    History of present illness:  Drake Torres is a 71 year old male who presents with knee pain involving the left knee. Onset was gradual, starting about 1 month ago.  Inciting event: none known.  Current symptoms include: pain located in the posterior lateral aspect, and anterior medial and lateral aspects of the knee, stiffness, and swelling.  Pain is aggravated by inactivity, rising after sitting, standing, and walking.  Patient has had prior knee problems.  Evaluation to date:   xrays .  Treatment to date:  ice and OTC analgesics which are somewhat effective.     Review of systems:  All 14 systems reviewed in full detail.  Pertinent positives as above in the HPI.  All other systems are negative or noncontributory at this time.     PAST MEDICAL HISTORY:    Reviewed and significant for:    Essential (primary) hypertension                               PAST SURGICAL HISTORY:      TUMOR REMOVAL                                                   Comment: \"lump on back\"    BASAL CELL CARCINOMA EXCISION                                   Comment: skin on stomach    CATARACT EXTRACTION W/ INTRAOCULAR LENS IMPLANT                 Comment: unsure of date    CATARACT EXTRACTION W/  INTRAOCULAR LENS IMPLA* 07/17/2017      Comment: Dr. [place]    COLONOSCOPY W/ POLYPECTOMY                      08/28/2020      Comment: Repeat in 5 years - Dr. Luciano    ESOPHAGOGASTRODUODENOSCOPY                      08/28/2020    ENDOSCOPIC ULTRASOUND (UPPER)                   08/28/2020    LAPAROSCOPIC PARTIAL GASTRECTOMY                09/30/2020    CURRENT MEDICATIONS:    No current facility-administered medications for this encounter.     Current Outpatient Medications   Medication Sig Dispense Refill    tadalafil (CIALIS) 10 MG tablet Take 1 tablet by mouth daily as needed for Erectile  I have discussed with the patient the rationale for blood component transfusion; its benefits in treating or preventing fatigue, organ damage, or death; and its risk which includes mild transfusion reactions, rare risk of blood borne infection, or more serious but rare reactions. I have discussed the alternatives to transfusion, including the risk and consequences of not receiving transfusion. The patient had an opportunity to ask questions and had agreed to proceed with transfusion of blood components. Dysfunction. 10 tablet 11    diclofenac (VOLTAREN) 1 % gel Apply 4 g topically 4 times daily as needed (pain). 150 g 1    cyclobenzaprine (FLEXERIL) 5 MG tablet Take 1 tablet by mouth 3 times daily as needed for Muscle spasms. 30 tablet 0       ALLERGIES:    ALLERGIES:  No Known Allergies    SOCIAL HISTORY:    Social History     Tobacco Use    Smoking status: Never    Smokeless tobacco: Never   Substance Use Topics    Alcohol use: Yes     Alcohol/week: 0.0 standard drinks of alcohol     Comment: ocassionally weekends     Drug use: No     8/12/2024   No changes in H&P.    Vital signs reviewed in pre-op & per EMR  CV:  RRR  Chest:  CTA bilateral        Impression:    Left Knee Primary Osteoarthritis  Left Knee Medial Meniscus Tear    PLAN:    Left Knee Arthroscopy with partial medial menisectomy and chondroplasty . Risks/Benefits discussed with patient

## 2024-09-11 ENCOUNTER — HOSPITAL ENCOUNTER (EMERGENCY)
Age: 49
Discharge: HOME | End: 2024-09-11
Attending: EMERGENCY MEDICINE
Payer: MEDICARE

## 2024-09-11 VITALS
SYSTOLIC BLOOD PRESSURE: 134 MMHG | RESPIRATION RATE: 18 BRPM | BODY MASS INDEX: 27.71 KG/M2 | TEMPERATURE: 98.8 F | WEIGHT: 210 LBS | HEART RATE: 68 BPM | OXYGEN SATURATION: 99 % | DIASTOLIC BLOOD PRESSURE: 91 MMHG

## 2024-09-11 DIAGNOSIS — J02.9 PHARYNGITIS, UNSPECIFIED ETIOLOGY: Primary | ICD-10-CM

## 2024-09-11 PROCEDURE — 6370000000 HC RX 637 (ALT 250 FOR IP): Performed by: EMERGENCY MEDICINE

## 2024-09-11 PROCEDURE — 99283 EMERGENCY DEPT VISIT LOW MDM: CPT

## 2024-09-11 RX ORDER — PENICILLIN V POTASSIUM 250 MG/1
250 TABLET, FILM COATED ORAL 3 TIMES DAILY
Qty: 30 TABLET | Refills: 0 | Status: SHIPPED | OUTPATIENT
Start: 2024-09-11 | End: 2024-09-21

## 2024-09-11 RX ORDER — ACETAMINOPHEN 325 MG/1
650 TABLET ORAL
Status: COMPLETED | OUTPATIENT
Start: 2024-09-11 | End: 2024-09-11

## 2024-09-11 RX ORDER — PENICILLIN V POTASSIUM 250 MG/1
500 TABLET, FILM COATED ORAL
Status: COMPLETED | OUTPATIENT
Start: 2024-09-11 | End: 2024-09-11

## 2024-09-11 RX ADMIN — ACETAMINOPHEN 650 MG: 325 TABLET ORAL at 01:40

## 2024-09-11 RX ADMIN — PENICILLIN V POTASSIUM 500 MG: 250 TABLET, FILM COATED ORAL at 01:40

## 2024-09-20 ENCOUNTER — HOSPITAL ENCOUNTER (OUTPATIENT)
Age: 49
Discharge: HOME | End: 2024-09-22
Payer: MEDICARE

## 2024-09-20 VITALS
BODY MASS INDEX: 27.83 KG/M2 | SYSTOLIC BLOOD PRESSURE: 127 MMHG | WEIGHT: 210 LBS | DIASTOLIC BLOOD PRESSURE: 88 MMHG | HEIGHT: 73 IN

## 2024-09-20 DIAGNOSIS — I42.8 ABNORMAL TRABECULATION OF LEFT VENTRICULAR MYOCARDIUM (HCC): ICD-10-CM

## 2024-09-20 LAB
ECHO AO ASC DIAM: 3.5 CM
ECHO AO ASCENDING AORTA INDEX: 1.59 CM/M2
ECHO AO ROOT DIAM: 3.7 CM
ECHO AO ROOT INDEX: 1.68 CM/M2
ECHO AV AREA PEAK VELOCITY: 2.5 CM2
ECHO AV AREA VTI: 2.8 CM2
ECHO AV AREA/BSA PEAK VELOCITY: 1.1 CM2/M2
ECHO AV AREA/BSA VTI: 1.3 CM2/M2
ECHO AV MEAN GRADIENT: 4 MMHG
ECHO AV MEAN VELOCITY: 1 M/S
ECHO AV PEAK GRADIENT: 7 MMHG
ECHO AV PEAK VELOCITY: 1.3 M/S
ECHO AV VELOCITY RATIO: 0.77
ECHO AV VTI: 28.8 CM
ECHO BSA: 2.21 M2
ECHO EST RA PRESSURE: 3 MMHG
ECHO IVC PROX: 1.7 CM
ECHO LA AREA 2C: 23.5 CM2
ECHO LA AREA 4C: 24.1 CM2
ECHO LA DIAMETER INDEX: 1.86 CM/M2
ECHO LA DIAMETER: 4.1 CM
ECHO LA MAJOR AXIS: 6.5 CM
ECHO LA MINOR AXIS: 6 CM
ECHO LA TO AORTIC ROOT RATIO: 1.11
ECHO LA VOL BP: 78 ML (ref 18–58)
ECHO LA VOL MOD A2C: 77 ML (ref 18–58)
ECHO LA VOL MOD A4C: 74 ML (ref 18–58)
ECHO LA VOL/BSA BIPLANE: 35 ML/M2 (ref 16–34)
ECHO LA VOLUME INDEX MOD A2C: 35 ML/M2 (ref 16–34)
ECHO LA VOLUME INDEX MOD A4C: 34 ML/M2 (ref 16–34)
ECHO LV E' LATERAL VELOCITY: 5.8 CM/S
ECHO LV E' SEPTAL VELOCITY: 4.4 CM/S
ECHO LV EDV A2C: 81 ML
ECHO LV EDV A4C: 87 ML
ECHO LV EDV INDEX A4C: 40 ML/M2
ECHO LV EDV NDEX A2C: 37 ML/M2
ECHO LV EJECTION FRACTION A2C: 64 %
ECHO LV EJECTION FRACTION A4C: 60 %
ECHO LV EJECTION FRACTION BIPLANE: 62 % (ref 55–100)
ECHO LV ESV A2C: 29 ML
ECHO LV ESV A4C: 35 ML
ECHO LV ESV INDEX A2C: 13 ML/M2
ECHO LV ESV INDEX A4C: 16 ML/M2
ECHO LV FRACTIONAL SHORTENING: 38 % (ref 28–44)
ECHO LV GLOBAL LONGITUDINAL STRAIN (GLS): -17.8 %
ECHO LV INTERNAL DIMENSION DIASTOLE INDEX: 2.27 CM/M2
ECHO LV INTERNAL DIMENSION DIASTOLIC: 5 CM (ref 4.2–5.9)
ECHO LV INTERNAL DIMENSION SYSTOLIC INDEX: 1.41 CM/M2
ECHO LV INTERNAL DIMENSION SYSTOLIC: 3.1 CM
ECHO LV IVSD: 1.5 CM (ref 0.6–1)
ECHO LV MASS 2D: 355.3 G (ref 88–224)
ECHO LV MASS INDEX 2D: 161.5 G/M2 (ref 49–115)
ECHO LV POSTERIOR WALL DIASTOLIC: 1.7 CM (ref 0.6–1)
ECHO LV RELATIVE WALL THICKNESS RATIO: 0.68
ECHO LVOT AREA: 3.1 CM2
ECHO LVOT AV VTI INDEX: 0.89
ECHO LVOT DIAM: 2 CM
ECHO LVOT MEAN GRADIENT: 2 MMHG
ECHO LVOT PEAK GRADIENT: 4 MMHG
ECHO LVOT PEAK VELOCITY: 1 M/S
ECHO LVOT STROKE VOLUME INDEX: 36.7 ML/M2
ECHO LVOT SV: 80.7 ML
ECHO LVOT VTI: 25.7 CM
ECHO MAIN PULMONARY ARTERY DIAMETER: 2.7 CM
ECHO MV A VELOCITY: 0.41 M/S
ECHO MV AREA VTI: 3.7 CM2
ECHO MV E DECELERATION TIME (DT): 419 MS
ECHO MV E VELOCITY: 0.52 M/S
ECHO MV E/A RATIO: 1.27
ECHO MV E/E' LATERAL: 8.97
ECHO MV E/E' RATIO (AVERAGED): 10.39
ECHO MV E/E' SEPTAL: 11.82
ECHO MV LVOT VTI INDEX: 0.84
ECHO MV MAX VELOCITY: 0.7 M/S
ECHO MV MEAN GRADIENT: 1 MMHG
ECHO MV MEAN VELOCITY: 0.4 M/S
ECHO MV PEAK GRADIENT: 2 MMHG
ECHO MV VTI: 21.7 CM
ECHO PULMONARY ARTERY END DIASTOLIC PRESSURE: 17 MMHG
ECHO PULMONARY ARTERY END DIASTOLIC PRESSURE: 9 MMHG
ECHO PV MAX VELOCITY: 1 M/S
ECHO PV MAX VELOCITY: 2.1 M/S
ECHO PV PEAK GRADIENT: 4 MMHG
ECHO PV REGURGITANT MAX VELOCITY: 1.5 M/S
ECHO RA AREA 4C: 22.5 CM2
ECHO RA END SYSTOLIC VOLUME APICAL 4 CHAMBER INDEX BSA: 36 ML/M2
ECHO RA VOLUME: 79 ML
ECHO RIGHT VENTRICULAR SYSTOLIC PRESSURE (RVSP): 16 MMHG
ECHO RV BASAL DIMENSION: 4.4 CM
ECHO RV GLOBAL SYSTOLIC STRAIN (GLS): -20.8 %
ECHO RV LONGITUDINAL DIMENSION: 7.6 CM
ECHO RV MID DIMENSION: 3.2 CM
ECHO RV TAPSE: 1.7 CM (ref 1.7–?)
ECHO TV REGURGITANT MAX VELOCITY: 1.77 M/S
ECHO TV REGURGITANT PEAK GRADIENT: 13 MMHG

## 2024-09-20 PROCEDURE — 93356 MYOCRD STRAIN IMG SPCKL TRCK: CPT

## 2024-10-01 ENCOUNTER — HOSPITAL ENCOUNTER (OUTPATIENT)
Age: 49
Setting detail: SPECIMEN
Discharge: HOME | End: 2024-10-04

## 2024-10-01 LAB — SENTARA SPECIMEN COLLECTION: NORMAL

## 2024-10-01 PROCEDURE — 99001 SPECIMEN HANDLING PT-LAB: CPT

## 2024-10-16 ENCOUNTER — HOSPITAL ENCOUNTER (OUTPATIENT)
Age: 49
Setting detail: SPECIMEN
Discharge: HOME | End: 2024-10-19

## 2024-10-16 LAB — SENTARA SPECIMEN COLLECTION: NORMAL

## 2024-10-16 PROCEDURE — 99001 SPECIMEN HANDLING PT-LAB: CPT

## 2024-10-28 ENCOUNTER — HOSPITAL ENCOUNTER (EMERGENCY)
Age: 49
Discharge: HOME | End: 2024-10-28
Attending: EMERGENCY MEDICINE
Payer: MEDICARE

## 2024-10-28 VITALS
SYSTOLIC BLOOD PRESSURE: 118 MMHG | OXYGEN SATURATION: 97 % | DIASTOLIC BLOOD PRESSURE: 86 MMHG | BODY MASS INDEX: 25.98 KG/M2 | TEMPERATURE: 98.1 F | RESPIRATION RATE: 18 BRPM | HEART RATE: 54 BPM | WEIGHT: 196 LBS | HEIGHT: 73 IN

## 2024-10-28 DIAGNOSIS — N28.9 RENAL INSUFFICIENCY: Primary | ICD-10-CM

## 2024-10-28 LAB
ANION GAP SERPL CALC-SCNC: 4 MMOL/L (ref 3–18)
BASOPHILS # BLD: 0 K/UL (ref 0–0.1)
BASOPHILS NFR BLD: 0 % (ref 0–2)
BUN SERPL-MCNC: 36 MG/DL (ref 7–18)
BUN/CREAT SERPL: 16 (ref 12–20)
CA-I BLD-MCNC: 10.2 MG/DL (ref 8.5–10.1)
CHLORIDE SERPL-SCNC: 109 MMOL/L (ref 100–111)
CO2 SERPL-SCNC: 24 MMOL/L (ref 21–32)
CREAT SERPL-MCNC: 2.3 MG/DL (ref 0.6–1.3)
DIFFERENTIAL METHOD BLD: ABNORMAL
EKG ATRIAL RATE: 57 BPM
EKG DIAGNOSIS: NORMAL
EKG P AXIS: 49 DEGREES
EKG P-R INTERVAL: 188 MS
EKG Q-T INTERVAL: 378 MS
EKG QRS DURATION: 110 MS
EKG QTC CALCULATION (BAZETT): 367 MS
EKG R AXIS: -51 DEGREES
EKG T AXIS: 102 DEGREES
EKG VENTRICULAR RATE: 57 BPM
EOSINOPHIL # BLD: 0 K/UL (ref 0–0.4)
EOSINOPHIL NFR BLD: 0 % (ref 0–5)
ERYTHROCYTE [DISTWIDTH] IN BLOOD BY AUTOMATED COUNT: 17 % (ref 11.6–14.5)
GLUCOSE SERPL-MCNC: 96 MG/DL (ref 74–99)
HCT VFR BLD AUTO: 59.8 % (ref 36–48)
HGB BLD-MCNC: 17.8 G/DL (ref 13–16)
IMM GRANULOCYTES # BLD AUTO: 0 K/UL (ref 0–0.04)
IMM GRANULOCYTES NFR BLD AUTO: 1 % (ref 0–0.5)
LYMPHOCYTES # BLD: 0.5 K/UL (ref 0.9–3.6)
LYMPHOCYTES NFR BLD: 8 % (ref 21–52)
MCH RBC QN AUTO: 26.9 PG (ref 24–34)
MCHC RBC AUTO-ENTMCNC: 29.8 G/DL (ref 31–37)
MCV RBC AUTO: 90.5 FL (ref 78–100)
MONOCYTES # BLD: 0.7 K/UL (ref 0.05–1.2)
MONOCYTES NFR BLD: 11 % (ref 3–10)
NEUTS SEG # BLD: 5.2 K/UL (ref 1.8–8)
NEUTS SEG NFR BLD: 80 % (ref 40–73)
NRBC # BLD: 0 K/UL (ref 0–0.01)
NRBC BLD-RTO: 0 PER 100 WBC
PLATELET # BLD AUTO: 211 K/UL (ref 135–420)
PMV BLD AUTO: 10.8 FL (ref 9.2–11.8)
POTASSIUM SERPL-SCNC: 5.3 MMOL/L (ref 3.5–5.5)
RBC # BLD AUTO: 6.61 M/UL (ref 4.35–5.65)
SODIUM SERPL-SCNC: 137 MMOL/L (ref 136–145)
WBC # BLD AUTO: 6.5 K/UL (ref 4.6–13.2)

## 2024-10-28 PROCEDURE — 85025 COMPLETE CBC W/AUTO DIFF WBC: CPT

## 2024-10-28 PROCEDURE — 93005 ELECTROCARDIOGRAM TRACING: CPT | Performed by: EMERGENCY MEDICINE

## 2024-10-28 PROCEDURE — 80048 BASIC METABOLIC PNL TOTAL CA: CPT

## 2024-10-28 PROCEDURE — 99284 EMERGENCY DEPT VISIT MOD MDM: CPT

## 2024-10-28 PROCEDURE — 2580000003 HC RX 258: Performed by: EMERGENCY MEDICINE

## 2024-10-28 RX ORDER — 0.9 % SODIUM CHLORIDE 0.9 %
1000 INTRAVENOUS SOLUTION INTRAVENOUS ONCE
Status: COMPLETED | OUTPATIENT
Start: 2024-10-28 | End: 2024-10-28

## 2024-10-28 RX ADMIN — SODIUM CHLORIDE 1000 ML: 9 INJECTION, SOLUTION INTRAVENOUS at 18:22

## 2024-10-28 ASSESSMENT — PAIN - FUNCTIONAL ASSESSMENT: PAIN_FUNCTIONAL_ASSESSMENT: NONE - DENIES PAIN

## 2024-10-28 NOTE — DISCHARGE INSTRUCTIONS
Call your transplant team to make sure they are aware of results, current creatine of 2.3  Return for pain, fever not resolving with motrin or tylenol, shortness of breath, vomiting, decreased fluid intake, weakness, numbness, dizziness, or any change or concerns or for repeat testing as discussed now.

## 2024-10-28 NOTE — ED NOTES
Bedside and Verbal shift change report given to Katlin Enciso (oncoming nurse) by NOEL Ring (offgoing nurse). Report included the following information Nurse Handoff Report, ED Encounter Summary, ED SBAR, MAR, and Recent Results.

## 2024-10-28 NOTE — ED PROVIDER NOTES
Omid Jarquin was seen and treated in our emergency department on 3/15/2022. She may return to work on 03/17/2022. If you have any questions or concerns, please don't hesitate to call.       Anabela Temple, DO rchedk here    Noted elevated hgb/hct, likely volume contraction. Will give IVF as will help if also hyperkalemic.     Signed out pending results of bmp to determine dispostiion.     Records Reviewed (source and summary of external notes): Prior medical records and Nursing notes.    Vitals:    Vitals:    10/28/24 1744 10/28/24 1754 10/28/24 1804 10/28/24 1814   BP: 115/83  121/86 118/86   Pulse:       Resp:       Temp:       TempSrc:       SpO2: 93% 96% 95% 97%   Weight:       Height:            ED COURSE       Patient was given the following medications:  Medications   sodium chloride 0.9 % bolus 1,000 mL ( IntraVENous Stopped 10/28/24 1949)       CONSULTS: See ED Course/MDM for further details.  None     PROCEDURES   Unless otherwise noted above, none  Procedures      CRITICAL CARE TIME   Patient does not meet Critical Care Time, 0 minutes    ED IMPRESSION     1. Renal insufficiency          DISPOSITION/PLAN   DISPOSITION Decision To Discharge 10/28/2024 07:43:32 PM         Discharge Note: The patient is stable for discharge home. The signs, symptoms, diagnosis, and discharge instructions have been discussed, understanding conveyed, and agreed upon. The patient is to follow up as recommended or return to ER should their symptoms worsen.      PATIENT REFERRED TO:  [facility] EMERGENCY DEPT  [address] [facility] [address] [phone]  Go to   As needed, If symptoms worsen    Anderson Hernandez MD  [address] [place] [address] [phone]              DISCHARGE MEDICATIONS:     Medication List        ASK your doctor about these medications      acyclovir 200 MG capsule  Commonly known as: ZOVIRAX     carvedilol 12.5 MG tablet  Commonly known as: COREG     hydrALAZINE 100 MG tablet  Commonly known as: APRESOLINE     LETERMOVIR PO     MEPRON PO     mycophenolate 250 MG capsule  Commonly known as: CELLCEPT     NIFEdipine 90 MG extended release tablet  Commonly known as: ADALAT CC     pantoprazole 20 MG

## 2024-10-28 NOTE — ED TRIAGE NOTES
Patient was seen in [place] today for a CT of mass on kidney left side. Had blood work done, was told to come back to [place] for treatment, patient states he was almost home so came here instead.     Was dialysis but no longer, due to having transplant in May 2024.

## 2024-10-28 NOTE — ED NOTES
Pt given discharge instruction. Read special note with follow up note from the physician. Pt states understanding. No prescriptions. Left ED ambulatory with no difficulties.

## 2024-10-28 NOTE — ED NOTES
1930 rec'd pt in sign out from dr Johnson, labs resulted. Pot 5.3.   cr 2.3  d/w pt, says told to come for pot recheck. No sx's. Wants dc, declines further testing. Aware of elev cr, says at his baseline.  Has been hydrated, pt declines recheck creatine. To dc per pt. Detailed ret inst given.      Sammy Quintana MD  10/30/24 0012

## 2025-04-14 ENCOUNTER — TRANSCRIBE ORDERS (OUTPATIENT)
Facility: HOSPITAL | Age: 50
End: 2025-04-14

## 2025-04-14 DIAGNOSIS — I10 PRIMARY HYPERTENSION: Primary | ICD-10-CM

## 2025-04-17 ENCOUNTER — HOSPITAL ENCOUNTER (EMERGENCY)
Age: 50
Discharge: HOME | End: 2025-04-17
Attending: FAMILY MEDICINE
Payer: MEDICARE

## 2025-04-17 VITALS
HEART RATE: 62 BPM | WEIGHT: 206 LBS | BODY MASS INDEX: 27.3 KG/M2 | SYSTOLIC BLOOD PRESSURE: 125 MMHG | RESPIRATION RATE: 15 BRPM | TEMPERATURE: 98.1 F | DIASTOLIC BLOOD PRESSURE: 87 MMHG | HEIGHT: 73 IN | OXYGEN SATURATION: 99 %

## 2025-04-17 DIAGNOSIS — L02.01 FACIAL ABSCESS: Primary | ICD-10-CM

## 2025-04-17 DIAGNOSIS — L72.3 INFECTED SEBACEOUS CYST: ICD-10-CM

## 2025-04-17 DIAGNOSIS — L08.9 INFECTED SEBACEOUS CYST: ICD-10-CM

## 2025-04-17 PROCEDURE — 6360000002 HC RX W HCPCS: Performed by: FAMILY MEDICINE

## 2025-04-17 PROCEDURE — 10061 I&D ABSCESS COMP/MULTIPLE: CPT

## 2025-04-17 PROCEDURE — 99283 EMERGENCY DEPT VISIT LOW MDM: CPT

## 2025-04-17 PROCEDURE — 6370000000 HC RX 637 (ALT 250 FOR IP): Performed by: FAMILY MEDICINE

## 2025-04-17 RX ORDER — GINSENG 100 MG
CAPSULE ORAL
Status: COMPLETED | OUTPATIENT
Start: 2025-04-17 | End: 2025-04-17

## 2025-04-17 RX ORDER — LIDOCAINE HYDROCHLORIDE 10 MG/ML
5 INJECTION, SOLUTION INFILTRATION; PERINEURAL ONCE
Status: COMPLETED | OUTPATIENT
Start: 2025-04-17 | End: 2025-04-17

## 2025-04-17 RX ORDER — CETIRIZINE HYDROCHLORIDE 10 MG/1
10 TABLET ORAL DAILY
COMMUNITY

## 2025-04-17 RX ORDER — LISINOPRIL 5 MG/1
5 TABLET ORAL DAILY
COMMUNITY

## 2025-04-17 RX ORDER — ASPIRIN 81 MG/1
81 TABLET ORAL DAILY
COMMUNITY

## 2025-04-17 RX ORDER — DOXYCYCLINE HYCLATE 100 MG
100 TABLET ORAL 2 TIMES DAILY
Qty: 14 TABLET | Refills: 0 | Status: SHIPPED | OUTPATIENT
Start: 2025-04-17 | End: 2025-04-24

## 2025-04-17 RX ORDER — MYCOPHENOLIC ACID 180 MG/1
360 TABLET, DELAYED RELEASE ORAL 2 TIMES DAILY
COMMUNITY

## 2025-04-17 RX ORDER — LATANOPROST 50 UG/ML
1 SOLUTION/ DROPS OPHTHALMIC NIGHTLY
COMMUNITY

## 2025-04-17 RX ADMIN — LIDOCAINE HYDROCHLORIDE 5 ML: 10 INJECTION, SOLUTION INFILTRATION; PERINEURAL at 08:00

## 2025-04-17 RX ADMIN — BACITRACIN 1 EACH: 500 OINTMENT TOPICAL at 08:36

## 2025-04-17 ASSESSMENT — PAIN DESCRIPTION - LOCATION
LOCATION: EAR
LOCATION: EAR

## 2025-04-17 ASSESSMENT — PAIN SCALES - GENERAL
PAINLEVEL_OUTOF10: 5
PAINLEVEL_OUTOF10: 3

## 2025-04-17 ASSESSMENT — PAIN - FUNCTIONAL ASSESSMENT
PAIN_FUNCTIONAL_ASSESSMENT: 0-10
PAIN_FUNCTIONAL_ASSESSMENT: 0-10

## 2025-04-17 ASSESSMENT — PAIN DESCRIPTION - ORIENTATION
ORIENTATION: LEFT
ORIENTATION: LEFT

## 2025-04-17 NOTE — ED PROVIDER NOTES
[facility] EMERGENCY DEPARTMENT  EMERGENCY DEPARTMENT ENCOUNTER      Pt Name: Domenic Hutchinson  MRN: [id number]  Birthdate 1975  Date of evaluation: 4/17/2025  Provider: Mainor Baez DO  8:39 AM    CHIEF COMPLAINT       Chief Complaint   Patient presents with    Ear Problem         HISTORY OF PRESENT ILLNESS    Domenic Hutchinson is a 50 y.o. male who presents to the emergency department patient comes in stating he has been having some swelling in the front of his ears it has been coming and going, he has been squeezing it and some pus has come out of it.  He is a renal transplant patient, about 1 year out.  Denies any problems with breathing or swallowing.  Tender to the touch  HPI    Nursing Notes were reviewed.    REVIEW OF SYSTEMS           PAST MEDICAL HISTORY     Past Medical History:   Diagnosis Date    Anemia     CAD (coronary artery disease)     MI (12 years ago)    Chronic kidney disease     ESRD x4 years HD Treatment    Dialysis patient     M, W, F    GERD (gastroesophageal reflux disease)     Hx of gastric ulcer 10/2020    Hypertension          SURGICAL HISTORY       Past Surgical History:   Procedure Laterality Date    COLONOSCOPY  2021    \"negative\"    CORONARY ARTERY BYPASS GRAFT      ORTHOPEDIC SURGERY      left carpal tunnel     OTHER SURGICAL HISTORY      Fistula to L upper arm    ROTATOR CUFF REPAIR           CURRENT MEDICATIONS       Previous Medications    ACYCLOVIR (ZOVIRAX) 200 MG CAPSULE        ASPIRIN 81 MG EC TABLET    Take 1 tablet by mouth daily    ATOVAQUONE (MEPRON PO)    Take by mouth    CARVEDILOL (COREG) 12.5 MG TABLET    Take 2 tablets by mouth 2 times daily (with meals)    CETIRIZINE (ZYRTEC) 10 MG TABLET    Take 1 tablet by mouth daily    HYDRALAZINE (APRESOLINE) 100 MG TABLET    Take 1 tablet by mouth 2 times daily    LATANOPROST (XALATAN) 0.005 % OPHTHALMIC SOLUTION    1 drop nightly    LETERMOVIR PO    Take by mouth    LISINOPRIL (PRINIVIL;ZESTRIL) 5 MG TABLET     Take 1 tablet by mouth daily    MYCOPHENOLATE (CELLCEPT) 250 MG CAPSULE    Take by mouth    MYCOPHENOLATE (MYFORTIC) 180 MG DR TABLET    Take 2 tablets by mouth 2 times daily    NIFEDIPINE (ADALAT CC) 90 MG EXTENDED RELEASE TABLET    Take 1 tablet by mouth in the morning and at bedtime    PANTOPRAZOLE (PROTONIX) 20 MG TABLET    Take 1 tablet by mouth daily    PREDNISOLONE 5 MG TABS        SEVELAMER (RENVELA) 800 MG TABLET    Take 1 tablet by mouth 3 times daily (with meals)    TACROLIMUS (PROGRAF) 1 MG CAPSULE           ALLERGIES     Sulfamethoxazole-trimethoprim    FAMILY HISTORY       Family History   Problem Relation Age of Onset    Hypertension Maternal Aunt     Hypertension Father     Hypertension Mother           SOCIAL HISTORY       Social History     Socioeconomic History    Marital status: Single     Spouse name: None    Number of children: None    Years of education: None    Highest education level: None   Tobacco Use    Smoking status: Former    Smokeless tobacco: Never    Tobacco comments:     Quit smoking: l   Vaping Use    Vaping status: Never Used   Substance and Sexual Activity    Alcohol use: Not Currently     Alcohol/week: 1.0 - 2.0 standard drink of alcohol    Drug use: Never     Social Drivers of Health     Food Insecurity: No Food Insecurity (5/23/2024)    Received from [facility]    Hunger Vital Sign     Worried About Running Out of Food in the Last Year: Never true     Ran Out of Food in the Last Year: Never true   Transportation Needs: No Transportation Needs (5/23/2024)    Received from [facility]    PRAPARE - Transportation     Lack of Transportation (Medical): No     Lack of Transportation (Non-Medical): No   Housing Stability: Low Risk  (5/23/2024)    Received from [facility]    Housing Stability Vital Sign     Unable to Pay for Housing in the Last Year: No     Number of Times Moved in the Last Year: 1     Homeless in the Last Year: No

## 2025-04-17 NOTE — ED NOTES
After the Bacitracin was applied a sterile folded 4x4 guaze dressing was secured over the wound site using 2 inch tape

## 2025-04-17 NOTE — DISCHARGE INSTRUCTIONS
As we spoke I have called in some doxycycline this is nontoxic to any kidneys.  Take this twice a day for the next 7 days.  I highly recommend that you touch base with Dr. Mcmahan's office this appears to be an infected sebaceous cyst hence why it keeps coming and going.  I would avoid squeezing this if possible.  Return to this department in 2 days to have this repacked and looked at.

## 2025-06-11 ENCOUNTER — HOSPITAL ENCOUNTER (OUTPATIENT)
Age: 50
Discharge: HOME | End: 2025-06-13
Payer: MEDICARE

## 2025-06-11 VITALS
HEIGHT: 73 IN | DIASTOLIC BLOOD PRESSURE: 81 MMHG | BODY MASS INDEX: 27.3 KG/M2 | SYSTOLIC BLOOD PRESSURE: 128 MMHG | WEIGHT: 206 LBS

## 2025-06-11 DIAGNOSIS — I10 PRIMARY HYPERTENSION: ICD-10-CM

## 2025-06-11 LAB
ECHO AO ASC DIAM: 3.8 CM
ECHO AO ASCENDING AORTA INDEX: 1.74 CM/M2
ECHO AO ROOT DIAM: 4 CM
ECHO AO ROOT INDEX: 1.83 CM/M2
ECHO AV AREA PEAK VELOCITY: 4.8 CM2
ECHO AV AREA VTI: 5.5 CM2
ECHO AV AREA/BSA PEAK VELOCITY: 2.2 CM2/M2
ECHO AV AREA/BSA VTI: 2.5 CM2/M2
ECHO AV MEAN GRADIENT: 4 MMHG
ECHO AV MEAN VELOCITY: 0.9 M/S
ECHO AV PEAK GRADIENT: 7 MMHG
ECHO AV PEAK VELOCITY: 1.4 M/S
ECHO AV VELOCITY RATIO: 0.71
ECHO AV VTI: 31.1 CM
ECHO BSA: 2.19 M2
ECHO EST RA PRESSURE: 3 MMHG
ECHO LA DIAMETER INDEX: 1.93 CM/M2
ECHO LA DIAMETER: 4.2 CM
ECHO LA TO AORTIC ROOT RATIO: 1.05
ECHO LA VOL A-L A2C: 122 ML (ref 18–58)
ECHO LA VOL A-L A4C: 120 ML (ref 18–58)
ECHO LA VOL BP: 120 ML (ref 18–58)
ECHO LA VOL MOD A2C: 116 ML (ref 18–58)
ECHO LA VOL MOD A4C: 116 ML (ref 18–58)
ECHO LA VOL/BSA BIPLANE: 55 ML/M2 (ref 16–34)
ECHO LA VOLUME AREA LENGTH: 126 ML
ECHO LA VOLUME INDEX A-L A2C: 56 ML/M2 (ref 16–34)
ECHO LA VOLUME INDEX A-L A4C: 55 ML/M2 (ref 16–34)
ECHO LA VOLUME INDEX AREA LENGTH: 58 ML/M2 (ref 16–34)
ECHO LA VOLUME INDEX MOD A2C: 53 ML/M2 (ref 16–34)
ECHO LA VOLUME INDEX MOD A4C: 53 ML/M2 (ref 16–34)
ECHO LV E' LATERAL VELOCITY: 10.68 CM/S
ECHO LV E' SEPTAL VELOCITY: 7.87 CM/S
ECHO LV EF PHYSICIAN: 60 %
ECHO LV EJECTION FRACTION A2C: 55 %
ECHO LV EJECTION FRACTION A4C: 57 %
ECHO LV EJECTION FRACTION BIPLANE: 55 % (ref 55–100)
ECHO LV FRACTIONAL SHORTENING: 33 % (ref 28–44)
ECHO LV GLOBAL LONGITUDINAL STRAIN (GLS): -13 %
ECHO LV INTERNAL DIMENSION DIASTOLE INDEX: 2.11 CM/M2
ECHO LV INTERNAL DIMENSION DIASTOLIC: 4.6 CM (ref 4.2–5.9)
ECHO LV INTERNAL DIMENSION SYSTOLIC INDEX: 1.42 CM/M2
ECHO LV INTERNAL DIMENSION SYSTOLIC: 3.1 CM
ECHO LV IVSD: 1.8 CM (ref 0.6–1)
ECHO LV MASS 2D: 395.3 G (ref 88–224)
ECHO LV MASS INDEX 2D: 181.4 G/M2 (ref 49–115)
ECHO LV POSTERIOR WALL DIASTOLIC: 1.9 CM (ref 0.6–1)
ECHO LV RELATIVE WALL THICKNESS RATIO: 0.83
ECHO LVOT AREA: 6.6 CM2
ECHO LVOT AV VTI INDEX: 0.84
ECHO LVOT DIAM: 2.9 CM
ECHO LVOT MEAN GRADIENT: 2 MMHG
ECHO LVOT PEAK GRADIENT: 4 MMHG
ECHO LVOT PEAK VELOCITY: 1 M/S
ECHO LVOT STROKE VOLUME INDEX: 79 ML/M2
ECHO LVOT SV: 172.3 ML
ECHO LVOT VTI: 26.1 CM
ECHO MAIN PULMONARY ARTERY DIAMETER: 2.5 CM
ECHO MV A VELOCITY: 0.52 M/S
ECHO MV AREA VTI: 6.8 CM2
ECHO MV E DECELERATION TIME (DT): 191.6 MS
ECHO MV E VELOCITY: 1.01 M/S
ECHO MV E/A RATIO: 1.94
ECHO MV E/E' LATERAL: 9.46
ECHO MV E/E' RATIO (AVERAGED): 11.15
ECHO MV E/E' SEPTAL: 12.83
ECHO MV LVOT VTI INDEX: 0.97
ECHO MV MAX VELOCITY: 0.9 M/S
ECHO MV MEAN GRADIENT: 1 MMHG
ECHO MV MEAN VELOCITY: 0.4 M/S
ECHO MV PEAK GRADIENT: 3 MMHG
ECHO MV VTI: 25.3 CM
ECHO PV MAX VELOCITY: 1 M/S
ECHO PV MEAN GRADIENT: 2 MMHG
ECHO PV MEAN VELOCITY: 0.6 M/S
ECHO PV PEAK GRADIENT: 4 MMHG
ECHO RA END SYSTOLIC VOLUME APICAL 4 CHAMBER INDEX BSA: 31 ML/M2
ECHO RA VOLUME BIPLANE METHOD OF DISKS: 63 ML
ECHO RA VOLUME INDEX BP: 29 ML/M2
ECHO RA VOLUME: 67 ML
ECHO RIGHT VENTRICULAR SYSTOLIC PRESSURE (RVSP): 12 MMHG
ECHO RV FRACTIONAL AREA CHANGE: 34 %
ECHO RV GLOBAL SYSTOLIC STRAIN (GLS): -18 %
ECHO RV TAPSE: 1.6 CM (ref 1.7–?)
ECHO TV REGURGITANT MAX VELOCITY: 1.54 M/S
ECHO TV REGURGITANT PEAK GRADIENT: 10 MMHG

## 2025-06-11 PROCEDURE — 93356 MYOCRD STRAIN IMG SPCKL TRCK: CPT

## 2025-08-29 ENCOUNTER — APPOINTMENT (OUTPATIENT)
Age: 50
End: 2025-08-29
Payer: MEDICARE

## 2025-08-29 ENCOUNTER — HOSPITAL ENCOUNTER (EMERGENCY)
Age: 50
Discharge: HOME | End: 2025-08-29
Attending: FAMILY MEDICINE
Payer: MEDICARE

## 2025-08-29 VITALS
WEIGHT: 206 LBS | HEIGHT: 73 IN | OXYGEN SATURATION: 97 % | BODY MASS INDEX: 27.3 KG/M2 | RESPIRATION RATE: 17 BRPM | HEART RATE: 67 BPM | TEMPERATURE: 99.2 F | SYSTOLIC BLOOD PRESSURE: 130 MMHG | DIASTOLIC BLOOD PRESSURE: 89 MMHG

## 2025-08-29 DIAGNOSIS — M94.0 COSTOCHONDRITIS, ACUTE: Primary | ICD-10-CM

## 2025-08-29 DIAGNOSIS — K21.9 GASTROESOPHAGEAL REFLUX DISEASE WITHOUT ESOPHAGITIS: ICD-10-CM

## 2025-08-29 LAB
ALBUMIN SERPL-MCNC: 3.4 G/DL (ref 3.4–5)
ALBUMIN/GLOB SERPL: 0.8
ALP SERPL-CCNC: 104 U/L (ref 45–117)
ALT SERPL-CCNC: 5 U/L (ref 10–50)
ANION GAP SERPL CALC-SCNC: 12 MMOL/L (ref 3–18)
APPEARANCE UR: CLEAR
AST SERPL W P-5'-P-CCNC: 11 U/L (ref 10–38)
BACTERIA URNS QL MICRO: ABNORMAL /HPF
BASOPHILS # BLD: 0.01 K/UL (ref 0–0.1)
BASOPHILS NFR BLD: 0.1 % (ref 0–2)
BILIRUB SERPL-MCNC: 0.4 MG/DL (ref 0.2–1)
BILIRUB UR QL: NEGATIVE
BNP SERPL-MCNC: 577 PG/ML (ref 36–900)
BUN SERPL-MCNC: 28 MG/DL (ref 6–23)
BUN/CREAT SERPL: 13
CA-I BLD-MCNC: 10.5 MG/DL (ref 8.5–10.1)
CHLORIDE SERPL-SCNC: 103 MMOL/L (ref 98–107)
CO2 SERPL-SCNC: 22 MMOL/L (ref 21–32)
COLLECT DATE STL: NORMAL
COLOR UR: YELLOW
CREAT SERPL-MCNC: 2.16 MG/DL (ref 0.6–1.3)
DIFFERENTIAL METHOD BLD: ABNORMAL
EKG ATRIAL RATE: 69 BPM
EKG DIAGNOSIS: NORMAL
EKG P AXIS: 48 DEGREES
EKG P-R INTERVAL: 198 MS
EKG Q-T INTERVAL: 378 MS
EKG QRS DURATION: 106 MS
EKG QTC CALCULATION (BAZETT): 405 MS
EKG R AXIS: -33 DEGREES
EKG T AXIS: 96 DEGREES
EKG VENTRICULAR RATE: 69 BPM
EOSINOPHIL # BLD: 0.01 K/UL (ref 0–0.4)
EOSINOPHIL NFR BLD: 0.1 % (ref 0–5)
EPITH CASTS URNS QL MICRO: ABNORMAL /LPF (ref 0–20)
ERYTHROCYTE [DISTWIDTH] IN BLOOD BY AUTOMATED COUNT: 14.6 % (ref 11.6–14.5)
GLOBULIN SER CALC-MCNC: 4.1 G/DL
GLUCOSE SERPL-MCNC: 109 MG/DL (ref 74–108)
GLUCOSE UR STRIP.AUTO-MCNC: NEGATIVE MG/DL
HCT VFR BLD AUTO: 41.2 % (ref 36–48)
HEMOCCULT SP1 STL QL: NEGATIVE
HGB BLD-MCNC: 13 G/DL (ref 13–16)
HGB UR QL STRIP: NEGATIVE
IMM GRANULOCYTES # BLD AUTO: 0.05 K/UL (ref 0–0.04)
IMM GRANULOCYTES NFR BLD AUTO: 0.6 % (ref 0–0.5)
KETONES UR QL STRIP.AUTO: ABNORMAL MG/DL
LACTATE SERPL-SCNC: 0.7 MMOL/L (ref 0.4–2)
LEUKOCYTE ESTERASE UR QL STRIP.AUTO: NEGATIVE
LYMPHOCYTES # BLD: 0.4 K/UL (ref 0.9–3.6)
LYMPHOCYTES NFR BLD: 4.4 % (ref 21–52)
MCH RBC QN AUTO: 28.1 PG (ref 24–34)
MCHC RBC AUTO-ENTMCNC: 31.6 G/DL (ref 31–37)
MCV RBC AUTO: 89 FL (ref 78–100)
MONOCYTES # BLD: 1.06 K/UL (ref 0.05–1.2)
MONOCYTES NFR BLD: 11.7 % (ref 3–10)
NEUTS SEG # BLD: 7.56 K/UL (ref 1.8–8)
NEUTS SEG NFR BLD: 83.1 % (ref 40–73)
NITRITE UR QL STRIP.AUTO: NEGATIVE
NRBC # BLD: 0 K/UL (ref 0–0.01)
NRBC BLD-RTO: 0 PER 100 WBC
PH UR STRIP: 6 (ref 5–8)
PLATELET # BLD AUTO: 172 K/UL (ref 135–420)
PMV BLD AUTO: 11 FL (ref 9.2–11.8)
POTASSIUM SERPL-SCNC: 4.7 MMOL/L (ref 3.5–5.5)
PROT SERPL-MCNC: 7.5 G/DL (ref 6.4–8.2)
PROT UR STRIP-MCNC: 30 MG/DL
RBC # BLD AUTO: 4.63 M/UL (ref 4.35–5.65)
RBC #/AREA URNS HPF: ABNORMAL /HPF (ref 0–2)
SODIUM SERPL-SCNC: 136 MMOL/L (ref 136–145)
SP GR UR REFRACTOMETRY: 1.03 (ref 1–1.03)
TROPONIN T SERPL HS-MCNC: 23.7 NG/L (ref 0–22)
TROPONIN T SERPL HS-MCNC: 28.6 NG/L (ref 0–22)
UROBILINOGEN UR QL STRIP.AUTO: 1 EU/DL (ref 0.2–1)
WBC # BLD AUTO: 9.1 K/UL (ref 4.6–13.2)
WBC URNS QL MICRO: ABNORMAL /HPF (ref 0–4)

## 2025-08-29 PROCEDURE — 99285 EMERGENCY DEPT VISIT HI MDM: CPT

## 2025-08-29 PROCEDURE — 81001 URINALYSIS AUTO W/SCOPE: CPT

## 2025-08-29 PROCEDURE — 82272 OCCULT BLD FECES 1-3 TESTS: CPT

## 2025-08-29 PROCEDURE — 85025 COMPLETE CBC W/AUTO DIFF WBC: CPT

## 2025-08-29 PROCEDURE — 6370000000 HC RX 637 (ALT 250 FOR IP): Performed by: FAMILY MEDICINE

## 2025-08-29 PROCEDURE — 93005 ELECTROCARDIOGRAM TRACING: CPT | Performed by: FAMILY MEDICINE

## 2025-08-29 PROCEDURE — 83605 ASSAY OF LACTIC ACID: CPT

## 2025-08-29 PROCEDURE — 71045 X-RAY EXAM CHEST 1 VIEW: CPT

## 2025-08-29 PROCEDURE — 80053 COMPREHEN METABOLIC PANEL: CPT

## 2025-08-29 PROCEDURE — 83880 ASSAY OF NATRIURETIC PEPTIDE: CPT

## 2025-08-29 PROCEDURE — 84484 ASSAY OF TROPONIN QUANT: CPT

## 2025-08-29 RX ORDER — MAGNESIUM HYDROXIDE/ALUMINUM HYDROXICE/SIMETHICONE 120; 1200; 1200 MG/30ML; MG/30ML; MG/30ML
30 SUSPENSION ORAL
Status: COMPLETED | OUTPATIENT
Start: 2025-08-29 | End: 2025-08-29

## 2025-08-29 RX ORDER — LIDOCAINE HYDROCHLORIDE 20 MG/ML
15 SOLUTION OROPHARYNGEAL
Status: COMPLETED | OUTPATIENT
Start: 2025-08-29 | End: 2025-08-29

## 2025-08-29 RX ADMIN — LIDOCAINE HYDROCHLORIDE 15 ML: 20 SOLUTION ORAL at 10:07

## 2025-08-29 RX ADMIN — ALUMINUM HYDROXIDE, MAGNESIUM HYDROXIDE, AND SIMETHICONE 30 ML: 200; 200; 20 SUSPENSION ORAL at 09:46

## 2025-08-29 ASSESSMENT — PAIN - FUNCTIONAL ASSESSMENT
PAIN_FUNCTIONAL_ASSESSMENT: 0-10

## 2025-08-29 ASSESSMENT — PAIN SCALES - GENERAL
PAINLEVEL_OUTOF10: 6
PAINLEVEL_OUTOF10: 3

## 2025-08-29 ASSESSMENT — PAIN DESCRIPTION - LOCATION: LOCATION: BACK;FLANK

## 2025-08-29 ASSESSMENT — HEART SCORE: ECG: NORMAL

## 2025-08-29 ASSESSMENT — PAIN DESCRIPTION - ORIENTATION: ORIENTATION: LEFT

## (undated) DEVICE — GAUZE,SPONGE,4"X4",16PLY,STRL,LF,10/TRAY: Brand: MEDLINE

## (undated) DEVICE — SLING ARM PD PCH VOGUE XL BLU --

## (undated) DEVICE — SUTURE VCRL SZ 2-0 L27IN ABSRB UD L26MM CT-2 1/2 CIR J269H

## (undated) DEVICE — INTENDED FOR TISSUE SEPARATION, AND OTHER PROCEDURES THAT REQUIRE A SHARP SURGICAL BLADE TO PUNCTURE OR CUT.: Brand: BARD-PARKER SAFETY BLADES SIZE 10, STERILE

## (undated) DEVICE — KIT COLON W/ 1.1OZ LUB AND 2 END

## (undated) DEVICE — STERILE POLYISOPRENE POWDER-FREE SURGICAL GLOVES WITH EMOLLIENT COATING: Brand: PROTEXIS

## (undated) DEVICE — 3M™ STERI-STRIP™ COMPOUND BENZOIN TINCTURE 40 BAGS/CARTON 4 CARTONS/CASE C1544: Brand: 3M™ STERI-STRIP™

## (undated) DEVICE — ATTACHMENT SMK EVAC FOR ES PNCL ACCUVAC

## (undated) DEVICE — THE ENDO CARRY-ON PROCEDURE KIT CONTAINS ALL OF THE SUPPLIES AND INFECTION PREVENTION PRODUCTS NEEDED FOR ENDOSCOPIC PROCEDURES: Brand: ENDO CARRY-ON PROCEDURE KIT

## (undated) DEVICE — KIT CLN UP BON SECOURS MARYV

## (undated) DEVICE — SYR 10ML LUER LOK 1/5ML GRAD --

## (undated) DEVICE — STRIP SKIN CLSR W0.25XL4IN WHT SPUNBOUND FBR NYL HI ADH

## (undated) DEVICE — GOWN,SIRUS,FABRNF,XL,20/CS: Brand: MEDLINE

## (undated) DEVICE — CONMED SCOPE SAVER BITE BLOCK, 20X27 MM: Brand: SCOPE SAVER

## (undated) DEVICE — ELECTRODE,RADIOTRANSLUCENT,FOAM,3PK: Brand: MEDLINE

## (undated) DEVICE — 3M™ TEGADERM™ HP TRANSPARENT FILM DRESSING FRAME STYLE, 9546HP, 4 IN X 4-1/2 IN (10 CM X 11.5 CM), 50/CT 4CT/CASE: Brand: 3M™ TEGADERM™

## (undated) DEVICE — GLOVE SURG SZ 85 THK118MIL BLK LTX FREE POLYISOPRENE BEAD

## (undated) DEVICE — SUTURE VCRL SZ 3-0 L54IN ABSRB UD LIGAPAK REEL POLYGLACTIN J285G

## (undated) DEVICE — Device: Brand: DEFENDO VALVE AND CONNECTOR KIT

## (undated) DEVICE — PREP SKN CHLRAPRP APL 26ML STR --

## (undated) DEVICE — SINGLE-USE BIOPSY FORCEPS: Brand: RADIAL JAW 4

## (undated) DEVICE — SHEET, DRAPE, SPLIT, STERILE: Brand: MEDLINE

## (undated) DEVICE — REM POLYHESIVE ADULT PATIENT RETURN ELECTRODE: Brand: VALLEYLAB

## (undated) DEVICE — 3M™ CUROS™ DISINFECTING CAP FOR NEEDLELESS CONNECTORS 270/CARTON 20 CARTONS/CASE CFF1-270: Brand: CUROS™

## (undated) DEVICE — BAG SPEC BIOHZRD 10 X 10 IN --

## (undated) DEVICE — SET ADMIN 16ML TBNG L100IN 2 Y INJ SITE IV PIGGY BK DISP

## (undated) DEVICE — 1200 GUARD II KIT W/5MM TUBE W/O VAC TUBE: Brand: GUARDIAN

## (undated) DEVICE — SPONGE LAP 18INX18IN XR ST 5/PK 40PK HPG

## (undated) DEVICE — STERILE POLYISOPRENE POWDER-FREE SURGICAL GLOVES: Brand: PROTEXIS

## (undated) DEVICE — FCPS RAD JAW 4LC 240CM W/NDL -- BX/20 RADIAL JAW 4

## (undated) DEVICE — (D)SENSOR RMFG 02 PULS OXMTR -- DISC BY MFR USE ITEM 133445

## (undated) DEVICE — FORCEPS BX L240CM JAW DIA2.8MM L CAP W/ NDL MIC MESH TOOTH

## (undated) DEVICE — CANN NASAL O2 CAPNOGRAPHY AD -- FILTERLINE

## (undated) DEVICE — GAUZE,SPONGE,4"X4",16PLY,XRAY,STRL,LF: Brand: MEDLINE

## (undated) DEVICE — FLEX ADVANTAGE 3000CC: Brand: FLEX ADVANTAGE

## (undated) DEVICE — ENDOGATOR TUBING FOR BOSTON SCIENTIFIC ENDOSTAT II PUMP, OLYMPUS OFP PUMP OR ENDO STRATUS PUMP: Brand: ENDOGATOR

## (undated) DEVICE — CONTAINER SPEC 20 ML LID NEUT BUFF FORMALIN 10 % POLYPR STS

## (undated) DEVICE — BITEBLOCK ENDOSCP 60FR MAXI WHT POLYETH STURDY W/ VELC WVN

## (undated) DEVICE — PACK PROCEDURE SURG MAJ W/ BASIN LF

## (undated) DEVICE — MOUTHPIECE ENDOSCP 20X27MM --

## (undated) DEVICE — SOLIDIFIER MEDC 1200ML -- CONVERT TO 356117

## (undated) DEVICE — Device: Brand: ENDOCAPSULE SMALL INTESTINAL CAPSULE ENDOSCOPE SET

## (undated) DEVICE — GOWN,AURORA,FABRIC-REINFORCED,X-LARGE: Brand: MEDLINE

## (undated) DEVICE — TUBING INSUFFLATION CAP W/ EXT CARBON DIOX ENDO SMARTCAP

## (undated) DEVICE — CUFF RMFG BP INF SZ 11 DISP -- LAWSON OEM ITEM 238915

## (undated) DEVICE — TRAY PREP DRY W/ PREM GLV 2 APPL 6 SPNG 2 UNDPD 1 OVERWRAP

## (undated) DEVICE — SOL IRR NACL 0.9% 500ML POUR --

## (undated) DEVICE — BAG BELONG PT PERS CLEAR HANDL